# Patient Record
Sex: FEMALE | Race: WHITE | NOT HISPANIC OR LATINO | Employment: OTHER | URBAN - METROPOLITAN AREA
[De-identification: names, ages, dates, MRNs, and addresses within clinical notes are randomized per-mention and may not be internally consistent; named-entity substitution may affect disease eponyms.]

---

## 2017-10-28 LAB
CHOLEST SERPL-MCNC: 148 MG/DL
CHOLEST/HDLC SERPL: 3.1 (CALC)
HDLC SERPL-MCNC: 47 MG/DL
LDLC SERPL CALC-MCNC: 77 MG/DL (CALC)
MAGNESIUM SERPL-MCNC: 1.9 MG/DL (ref 1.5–2.5)
NONHDLC SERPL-MCNC: 101 MG/DL (CALC)
TRIGL SERPL-MCNC: 139 MG/DL
TSH SERPL-ACNC: 1.55 MIU/L (ref 0.4–4.5)

## 2017-11-07 LAB
ALBUMIN SERPL-MCNC: 4.2 G/DL (ref 3.6–5.1)
ALBUMIN/GLOB SERPL: 1.6 (CALC) (ref 1–2.5)
ALP SERPL-CCNC: 99 U/L (ref 33–130)
ALT SERPL-CCNC: 13 U/L (ref 6–29)
AST SERPL-CCNC: 16 U/L (ref 10–35)
BASOPHILS # BLD AUTO: 80 CELLS/UL (ref 0–200)
BASOPHILS NFR BLD AUTO: 1.2 %
BILIRUB SERPL-MCNC: 0.6 MG/DL (ref 0.2–1.2)
BUN SERPL-MCNC: 26 MG/DL (ref 7–25)
BUN/CREAT SERPL: 19 (CALC) (ref 6–22)
CALCIUM SERPL-MCNC: 9.4 MG/DL (ref 8.6–10.4)
CHLORIDE SERPL-SCNC: 105 MMOL/L (ref 98–110)
CO2 SERPL-SCNC: 28 MMOL/L (ref 20–31)
CREAT SERPL-MCNC: 1.36 MG/DL (ref 0.6–0.93)
EOSINOPHIL # BLD AUTO: 261 CELLS/UL (ref 15–500)
EOSINOPHIL NFR BLD AUTO: 3.9 %
ERYTHROCYTE [DISTWIDTH] IN BLOOD BY AUTOMATED COUNT: 14.5 % (ref 11–15)
GLOBULIN SER CALC-MCNC: 2.7 G/DL (CALC) (ref 1.9–3.7)
GLUCOSE SERPL-MCNC: 117 MG/DL (ref 65–99)
HCT VFR BLD AUTO: 43.6 % (ref 35–45)
HGB BLD-MCNC: 14.9 G/DL (ref 11.7–15.5)
LYMPHOCYTES # BLD AUTO: 1333 CELLS/UL (ref 850–3900)
LYMPHOCYTES NFR BLD AUTO: 19.9 %
MCH RBC QN AUTO: 33.9 PG (ref 27–33)
MCHC RBC AUTO-ENTMCNC: 34.2 G/DL (ref 32–36)
MCV RBC AUTO: 99.1 FL (ref 80–100)
MONOCYTES # BLD AUTO: 382 CELLS/UL (ref 200–950)
MONOCYTES NFR BLD AUTO: 5.7 %
NEUTROPHILS # BLD AUTO: 4643 CELLS/UL (ref 1500–7800)
NEUTROPHILS NFR BLD AUTO: 69.3 %
PLATELET # BLD AUTO: 194 THOUSAND/UL (ref 140–400)
PMV BLD REES-ECKER: 10.5 FL (ref 7.5–12.5)
POTASSIUM SERPL-SCNC: 4.5 MMOL/L (ref 3.5–5.3)
PROT SERPL-MCNC: 6.9 G/DL (ref 6.1–8.1)
RBC # BLD AUTO: 4.4 MILLION/UL (ref 3.8–5.1)
SL AMB EGFR AFRICAN AMERICAN: 44 ML/MIN/1.73M2
SL AMB EGFR NON AFRICAN AMERICAN: 38 ML/MIN/1.73M2
SODIUM SERPL-SCNC: 144 MMOL/L (ref 135–146)
WBC # BLD AUTO: 6.7 THOUSAND/UL (ref 3.8–10.8)

## 2017-11-15 ENCOUNTER — CONVERSION ENCOUNTER (OUTPATIENT)
Dept: RADIOLOGY | Facility: IMAGING CENTER | Age: 77
End: 2017-11-15

## 2018-04-27 LAB
ALBUMIN SERPL-MCNC: 3.9 G/DL (ref 3.6–5.1)
ALBUMIN/GLOB SERPL: 1.5 (CALC) (ref 1–2.5)
ALP SERPL-CCNC: 90 U/L (ref 33–130)
ALT SERPL-CCNC: 10 U/L (ref 6–29)
AST SERPL-CCNC: 15 U/L (ref 10–35)
BASOPHILS # BLD AUTO: 52 CELLS/UL (ref 0–200)
BASOPHILS NFR BLD AUTO: 0.8 %
BILIRUB SERPL-MCNC: 0.6 MG/DL (ref 0.2–1.2)
BUN SERPL-MCNC: 19 MG/DL (ref 7–25)
BUN/CREAT SERPL: 16 (CALC) (ref 6–22)
CALCIUM SERPL-MCNC: 9.3 MG/DL (ref 8.6–10.4)
CHLORIDE SERPL-SCNC: 109 MMOL/L (ref 98–110)
CHOLEST SERPL-MCNC: 135 MG/DL
CHOLEST/HDLC SERPL: 2.8 (CALC)
CO2 SERPL-SCNC: 26 MMOL/L (ref 20–31)
CREAT SERPL-MCNC: 1.21 MG/DL (ref 0.6–0.93)
EOSINOPHIL # BLD AUTO: 202 CELLS/UL (ref 15–500)
EOSINOPHIL NFR BLD AUTO: 3.1 %
ERYTHROCYTE [DISTWIDTH] IN BLOOD BY AUTOMATED COUNT: 14.8 % (ref 11–15)
GLOBULIN SER CALC-MCNC: 2.6 G/DL (CALC) (ref 1.9–3.7)
GLUCOSE SERPL-MCNC: 115 MG/DL (ref 65–99)
HCT VFR BLD AUTO: 43.4 % (ref 35–45)
HDLC SERPL-MCNC: 48 MG/DL
HGB BLD-MCNC: 14.7 G/DL (ref 11.7–15.5)
LDLC SERPL CALC-MCNC: 62 MG/DL (CALC)
LYMPHOCYTES # BLD AUTO: 1157 CELLS/UL (ref 850–3900)
LYMPHOCYTES NFR BLD AUTO: 17.8 %
MCH RBC QN AUTO: 34.2 PG (ref 27–33)
MCHC RBC AUTO-ENTMCNC: 33.9 G/DL (ref 32–36)
MCV RBC AUTO: 100.9 FL (ref 80–100)
MONOCYTES # BLD AUTO: 319 CELLS/UL (ref 200–950)
MONOCYTES NFR BLD AUTO: 4.9 %
NEUTROPHILS # BLD AUTO: 4771 CELLS/UL (ref 1500–7800)
NEUTROPHILS NFR BLD AUTO: 73.4 %
NONHDLC SERPL-MCNC: 87 MG/DL (CALC)
PLATELET # BLD AUTO: 176 THOUSAND/UL (ref 140–400)
PMV BLD REES-ECKER: 10.5 FL (ref 7.5–12.5)
POTASSIUM SERPL-SCNC: 4.6 MMOL/L (ref 3.5–5.3)
PROT SERPL-MCNC: 6.5 G/DL (ref 6.1–8.1)
RBC # BLD AUTO: 4.3 MILLION/UL (ref 3.8–5.1)
SL AMB EGFR AFRICAN AMERICAN: 51 ML/MIN/1.73M2
SL AMB EGFR NON AFRICAN AMERICAN: 44 ML/MIN/1.73M2
SODIUM SERPL-SCNC: 146 MMOL/L (ref 135–146)
TRIGL SERPL-MCNC: 167 MG/DL
TSH SERPL-ACNC: 1.64 MIU/L (ref 0.4–4.5)
WBC # BLD AUTO: 6.5 THOUSAND/UL (ref 3.8–10.8)

## 2018-07-17 ENCOUNTER — APPOINTMENT (OUTPATIENT)
Dept: LAB | Facility: IMAGING CENTER | Age: 78
End: 2018-07-17
Payer: COMMERCIAL

## 2018-07-17 ENCOUNTER — TRANSCRIBE ORDERS (OUTPATIENT)
Dept: ADMINISTRATIVE | Facility: HOSPITAL | Age: 78
End: 2018-07-17

## 2018-07-17 DIAGNOSIS — D58.2 OTHER HEMOGLOBINOPATHIES (HCC): Primary | ICD-10-CM

## 2018-07-17 DIAGNOSIS — D58.2 OTHER HEMOGLOBINOPATHIES (HCC): ICD-10-CM

## 2018-07-17 LAB
ALBUMIN SERPL BCP-MCNC: 3.7 G/DL (ref 3.5–5)
ALP SERPL-CCNC: 110 U/L (ref 46–116)
ALT SERPL W P-5'-P-CCNC: 23 U/L (ref 12–78)
ANION GAP SERPL CALCULATED.3IONS-SCNC: 7 MMOL/L (ref 4–13)
AST SERPL W P-5'-P-CCNC: 16 U/L (ref 5–45)
BASOPHILS # BLD AUTO: 0.06 THOUSANDS/ΜL (ref 0–0.1)
BASOPHILS NFR BLD AUTO: 1 % (ref 0–1)
BILIRUB SERPL-MCNC: 0.59 MG/DL (ref 0.2–1)
BUN SERPL-MCNC: 26 MG/DL (ref 5–25)
CALCIUM SERPL-MCNC: 9.4 MG/DL (ref 8.3–10.1)
CHLORIDE SERPL-SCNC: 109 MMOL/L (ref 100–108)
CO2 SERPL-SCNC: 27 MMOL/L (ref 21–32)
CREAT SERPL-MCNC: 1.15 MG/DL (ref 0.6–1.3)
EOSINOPHIL # BLD AUTO: 0.15 THOUSAND/ΜL (ref 0–0.61)
EOSINOPHIL NFR BLD AUTO: 2 % (ref 0–6)
ERYTHROCYTE [DISTWIDTH] IN BLOOD BY AUTOMATED COUNT: 15 % (ref 11.6–15.1)
GFR SERPL CREATININE-BSD FRML MDRD: 47 ML/MIN/1.73SQ M
GLUCOSE SERPL-MCNC: 90 MG/DL (ref 65–140)
HCT VFR BLD AUTO: 45.1 % (ref 34.8–46.1)
HGB BLD-MCNC: 14.5 G/DL (ref 11.5–15.4)
IMM GRANULOCYTES # BLD AUTO: 0.02 THOUSAND/UL (ref 0–0.2)
IMM GRANULOCYTES NFR BLD AUTO: 0 % (ref 0–2)
LYMPHOCYTES # BLD AUTO: 1.25 THOUSANDS/ΜL (ref 0.6–4.47)
LYMPHOCYTES NFR BLD AUTO: 16 % (ref 14–44)
MCH RBC QN AUTO: 33.3 PG (ref 26.8–34.3)
MCHC RBC AUTO-ENTMCNC: 32.2 G/DL (ref 31.4–37.4)
MCV RBC AUTO: 104 FL (ref 82–98)
MONOCYTES # BLD AUTO: 0.47 THOUSAND/ΜL (ref 0.17–1.22)
MONOCYTES NFR BLD AUTO: 6 % (ref 4–12)
NEUTROPHILS # BLD AUTO: 6.13 THOUSANDS/ΜL (ref 1.85–7.62)
NEUTS SEG NFR BLD AUTO: 75 % (ref 43–75)
NRBC BLD AUTO-RTO: 0 /100 WBCS
PLATELET # BLD AUTO: 187 THOUSANDS/UL (ref 149–390)
PMV BLD AUTO: 10.4 FL (ref 8.9–12.7)
POTASSIUM SERPL-SCNC: 4.9 MMOL/L (ref 3.5–5.3)
PROT SERPL-MCNC: 7.1 G/DL (ref 6.4–8.2)
RBC # BLD AUTO: 4.35 MILLION/UL (ref 3.81–5.12)
SODIUM SERPL-SCNC: 143 MMOL/L (ref 136–145)
WBC # BLD AUTO: 8.08 THOUSAND/UL (ref 4.31–10.16)

## 2018-07-17 PROCEDURE — 85025 COMPLETE CBC W/AUTO DIFF WBC: CPT

## 2018-07-17 PROCEDURE — 80053 COMPREHEN METABOLIC PANEL: CPT

## 2018-07-17 PROCEDURE — 36415 COLL VENOUS BLD VENIPUNCTURE: CPT

## 2018-09-01 DIAGNOSIS — D45 PV (POLYCYTHEMIA VERA) (HCC): Primary | ICD-10-CM

## 2018-09-04 RX ORDER — HYDROXYUREA 500 MG/1
CAPSULE ORAL
Qty: 90 CAPSULE | Refills: 4 | Status: SHIPPED | OUTPATIENT
Start: 2018-09-04 | End: 2018-10-17 | Stop reason: SDUPTHER

## 2018-09-17 ENCOUNTER — APPOINTMENT (OUTPATIENT)
Dept: LAB | Facility: IMAGING CENTER | Age: 78
End: 2018-09-17
Payer: COMMERCIAL

## 2018-09-17 ENCOUNTER — TRANSCRIBE ORDERS (OUTPATIENT)
Dept: ADMINISTRATIVE | Facility: HOSPITAL | Age: 78
End: 2018-09-17

## 2018-09-17 DIAGNOSIS — D58.2 OTHER HEMOGLOBINOPATHIES (HCC): Primary | ICD-10-CM

## 2018-09-17 DIAGNOSIS — D58.2 OTHER HEMOGLOBINOPATHIES (HCC): ICD-10-CM

## 2018-09-17 LAB
BASOPHILS # BLD AUTO: 0.05 THOUSANDS/ΜL (ref 0–0.1)
BASOPHILS NFR BLD AUTO: 1 % (ref 0–1)
EOSINOPHIL # BLD AUTO: 0.17 THOUSAND/ΜL (ref 0–0.61)
EOSINOPHIL NFR BLD AUTO: 3 % (ref 0–6)
ERYTHROCYTE [DISTWIDTH] IN BLOOD BY AUTOMATED COUNT: 14.7 % (ref 11.6–15.1)
HCT VFR BLD AUTO: 45.9 % (ref 34.8–46.1)
HGB BLD-MCNC: 14.9 G/DL (ref 11.5–15.4)
IMM GRANULOCYTES # BLD AUTO: 0.02 THOUSAND/UL (ref 0–0.2)
IMM GRANULOCYTES NFR BLD AUTO: 0 % (ref 0–2)
LYMPHOCYTES # BLD AUTO: 1.22 THOUSANDS/ΜL (ref 0.6–4.47)
LYMPHOCYTES NFR BLD AUTO: 19 % (ref 14–44)
MCH RBC QN AUTO: 34.1 PG (ref 26.8–34.3)
MCHC RBC AUTO-ENTMCNC: 32.5 G/DL (ref 31.4–37.4)
MCV RBC AUTO: 105 FL (ref 82–98)
MONOCYTES # BLD AUTO: 0.24 THOUSAND/ΜL (ref 0.17–1.22)
MONOCYTES NFR BLD AUTO: 4 % (ref 4–12)
NEUTROPHILS # BLD AUTO: 4.78 THOUSANDS/ΜL (ref 1.85–7.62)
NEUTS SEG NFR BLD AUTO: 73 % (ref 43–75)
NRBC BLD AUTO-RTO: 0 /100 WBCS
PLATELET # BLD AUTO: 175 THOUSANDS/UL (ref 149–390)
PMV BLD AUTO: 10 FL (ref 8.9–12.7)
RBC # BLD AUTO: 4.37 MILLION/UL (ref 3.81–5.12)
WBC # BLD AUTO: 6.48 THOUSAND/UL (ref 4.31–10.16)

## 2018-09-17 PROCEDURE — 85025 COMPLETE CBC W/AUTO DIFF WBC: CPT

## 2018-09-17 PROCEDURE — 36415 COLL VENOUS BLD VENIPUNCTURE: CPT

## 2018-09-26 PROBLEM — D45 PV (POLYCYTHEMIA VERA) (HCC): Status: ACTIVE | Noted: 2018-09-26

## 2018-09-26 PROBLEM — D51.8 OTHER VITAMIN B12 DEFICIENCY ANEMIAS: Status: ACTIVE | Noted: 2018-09-26

## 2018-10-01 DIAGNOSIS — E78.5 HYPERLIPIDEMIA, UNSPECIFIED HYPERLIPIDEMIA TYPE: Primary | ICD-10-CM

## 2018-10-02 RX ORDER — ATORVASTATIN CALCIUM 20 MG/1
TABLET, FILM COATED ORAL
Qty: 90 TABLET | Refills: 1 | Status: SHIPPED | OUTPATIENT
Start: 2018-10-02 | End: 2018-12-11 | Stop reason: SDUPTHER

## 2018-10-10 ENCOUNTER — APPOINTMENT (OUTPATIENT)
Dept: LAB | Facility: IMAGING CENTER | Age: 78
End: 2018-10-10
Payer: COMMERCIAL

## 2018-10-10 ENCOUNTER — TRANSCRIBE ORDERS (OUTPATIENT)
Dept: ADMINISTRATIVE | Facility: HOSPITAL | Age: 78
End: 2018-10-10

## 2018-10-10 DIAGNOSIS — D58.2 OTHER HEMOGLOBINOPATHIES (HCC): ICD-10-CM

## 2018-10-10 DIAGNOSIS — D58.2 OTHER HEMOGLOBINOPATHIES (HCC): Primary | ICD-10-CM

## 2018-10-10 LAB
ALBUMIN SERPL BCP-MCNC: 3.5 G/DL (ref 3.5–5)
ALP SERPL-CCNC: 98 U/L (ref 46–116)
ALT SERPL W P-5'-P-CCNC: 21 U/L (ref 12–78)
ANION GAP SERPL CALCULATED.3IONS-SCNC: 3 MMOL/L (ref 4–13)
AST SERPL W P-5'-P-CCNC: 16 U/L (ref 5–45)
BASOPHILS # BLD AUTO: 0.06 THOUSANDS/ΜL (ref 0–0.1)
BASOPHILS NFR BLD AUTO: 1 % (ref 0–1)
BILIRUB SERPL-MCNC: 0.66 MG/DL (ref 0.2–1)
BUN SERPL-MCNC: 20 MG/DL (ref 5–25)
CALCIUM SERPL-MCNC: 8.8 MG/DL (ref 8.3–10.1)
CHLORIDE SERPL-SCNC: 107 MMOL/L (ref 100–108)
CO2 SERPL-SCNC: 29 MMOL/L (ref 21–32)
CREAT SERPL-MCNC: 1.3 MG/DL (ref 0.6–1.3)
CRP SERPL QL: <3 MG/L
EOSINOPHIL # BLD AUTO: 0.15 THOUSAND/ΜL (ref 0–0.61)
EOSINOPHIL NFR BLD AUTO: 3 % (ref 0–6)
ERYTHROCYTE [DISTWIDTH] IN BLOOD BY AUTOMATED COUNT: 14.6 % (ref 11.6–15.1)
ERYTHROCYTE [SEDIMENTATION RATE] IN BLOOD: 14 MM/HOUR (ref 0–20)
EST. AVERAGE GLUCOSE BLD GHB EST-MCNC: 123 MG/DL
GFR SERPL CREATININE-BSD FRML MDRD: 40 ML/MIN/1.73SQ M
GLUCOSE SERPL-MCNC: 106 MG/DL (ref 65–140)
HBA1C MFR BLD: 5.9 % (ref 4.2–6.3)
HCT VFR BLD AUTO: 43.1 % (ref 34.8–46.1)
HGB BLD-MCNC: 13.8 G/DL (ref 11.5–15.4)
IMM GRANULOCYTES # BLD AUTO: 0.02 THOUSAND/UL (ref 0–0.2)
IMM GRANULOCYTES NFR BLD AUTO: 0 % (ref 0–2)
LDH SERPL-CCNC: 154 U/L (ref 81–234)
LYMPHOCYTES # BLD AUTO: 1 THOUSANDS/ΜL (ref 0.6–4.47)
LYMPHOCYTES NFR BLD AUTO: 17 % (ref 14–44)
MCH RBC QN AUTO: 33.6 PG (ref 26.8–34.3)
MCHC RBC AUTO-ENTMCNC: 32 G/DL (ref 31.4–37.4)
MCV RBC AUTO: 105 FL (ref 82–98)
MONOCYTES # BLD AUTO: 0.22 THOUSAND/ΜL (ref 0.17–1.22)
MONOCYTES NFR BLD AUTO: 4 % (ref 4–12)
NEUTROPHILS # BLD AUTO: 4.3 THOUSANDS/ΜL (ref 1.85–7.62)
NEUTS SEG NFR BLD AUTO: 75 % (ref 43–75)
NRBC BLD AUTO-RTO: 0 /100 WBCS
PLATELET # BLD AUTO: 174 THOUSANDS/UL (ref 149–390)
PMV BLD AUTO: 10 FL (ref 8.9–12.7)
POTASSIUM SERPL-SCNC: 4.5 MMOL/L (ref 3.5–5.3)
PROT SERPL-MCNC: 7 G/DL (ref 6.4–8.2)
RBC # BLD AUTO: 4.11 MILLION/UL (ref 3.81–5.12)
SODIUM SERPL-SCNC: 139 MMOL/L (ref 136–145)
VIT B12 SERPL-MCNC: 2110 PG/ML (ref 100–900)
WBC # BLD AUTO: 5.75 THOUSAND/UL (ref 4.31–10.16)

## 2018-10-10 PROCEDURE — 83918 ORGANIC ACIDS TOTAL QUANT: CPT

## 2018-10-10 PROCEDURE — 82607 VITAMIN B-12: CPT

## 2018-10-10 PROCEDURE — 86140 C-REACTIVE PROTEIN: CPT

## 2018-10-10 PROCEDURE — 85025 COMPLETE CBC W/AUTO DIFF WBC: CPT

## 2018-10-10 PROCEDURE — 83615 LACTATE (LD) (LDH) ENZYME: CPT

## 2018-10-10 PROCEDURE — 83036 HEMOGLOBIN GLYCOSYLATED A1C: CPT

## 2018-10-10 PROCEDURE — 80053 COMPREHEN METABOLIC PANEL: CPT

## 2018-10-10 PROCEDURE — 85652 RBC SED RATE AUTOMATED: CPT

## 2018-10-13 LAB
METHYLMALONATE SERPL-SCNC: 181 NMOL/L (ref 0–378)
SL AMB DISCLAIMER: NORMAL

## 2018-10-17 ENCOUNTER — OFFICE VISIT (OUTPATIENT)
Dept: HEMATOLOGY ONCOLOGY | Facility: CLINIC | Age: 78
End: 2018-10-17
Payer: COMMERCIAL

## 2018-10-17 VITALS
OXYGEN SATURATION: 97 % | RESPIRATION RATE: 18 BRPM | DIASTOLIC BLOOD PRESSURE: 78 MMHG | BODY MASS INDEX: 36.62 KG/M2 | SYSTOLIC BLOOD PRESSURE: 188 MMHG | TEMPERATURE: 97.3 F | WEIGHT: 199 LBS | HEART RATE: 66 BPM | HEIGHT: 62 IN

## 2018-10-17 DIAGNOSIS — D45 PV (POLYCYTHEMIA VERA) (HCC): Primary | ICD-10-CM

## 2018-10-17 DIAGNOSIS — D51.8 OTHER VITAMIN B12 DEFICIENCY ANEMIAS: ICD-10-CM

## 2018-10-17 PROCEDURE — 99214 OFFICE O/P EST MOD 30 MIN: CPT | Performed by: INTERNAL MEDICINE

## 2018-10-17 RX ORDER — OMEPRAZOLE 20 MG/1
20 CAPSULE, DELAYED RELEASE ORAL DAILY
COMMUNITY
End: 2018-12-11 | Stop reason: SDUPTHER

## 2018-10-17 RX ORDER — METOPROLOL TARTRATE 50 MG/1
25 TABLET, FILM COATED ORAL EVERY 12 HOURS SCHEDULED
COMMUNITY
End: 2018-12-11 | Stop reason: SDUPTHER

## 2018-10-17 RX ORDER — ASPIRIN 81 MG/1
81 TABLET, CHEWABLE ORAL DAILY
COMMUNITY

## 2018-10-17 RX ORDER — HYDROXYUREA 500 MG/1
500 CAPSULE ORAL DAILY
Qty: 90 CAPSULE | Refills: 3 | Status: SHIPPED | OUTPATIENT
Start: 2018-10-17 | End: 2019-08-09 | Stop reason: SDUPTHER

## 2018-10-17 NOTE — PROGRESS NOTES
Hematology/Oncology Outpatient Follow-up  Danelle Adams 70 y o  female 1940 [id number]    Date:  10/17/2018        Assessment and Plan:  1  PV (polycythemia vera) ([facility])  We Will keep her on the same dose of Hydrea of 500 mg once a day 2 days on and 1 day off  She is also to continue the baby aspirin on a daily basis  We will continue to monitor her blood counts on every 6 week basis and see her again in 4-6 months for follow-up  - CBC and differential; Standing  - Comprehensive metabolic panel; Standing  - CBC and differential  - Comprehensive metabolic panel  - CBC and differential; Future  - Comprehensive metabolic panel; Future  - C-reactive protein; Future  - LD,Blood; Future  - Sedimentation rate, automated; Future  - Erythropoietin; Future  - hydroxyurea (HYDREA) 500 mg capsule; Take 1 capsule (500 mg total) by mouth daily As directed  Dispense: 90 capsule; Refill: 3    2  Other vitamin B12 deficiency anemias  She is to continue the vitamin B12 oral supplements  - Vitamin B12; Future        HPI:  The patient came today for a follow-up visit  She is doing well with the current dose of the Hydrea without any significant problems or symptoms  Her hemoglobin level continues to be within normal range with normal white cells and platelets  Her systolic blood pressure was high today  Oncology History    Patient had extensive workup for further evaluation of her elevated H&H  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, which is diagnostic for polycythemia vera  She was started on Hydrea 500 mg once a day February 10, 2016  The dose was increased to 500 mg twice a day which resulted in significant drop of her platelet count  We did then decrease the dose and put on hold April 4, 2016  Patient had 2 phlebotomies while she was off the Hydrea  She is currently on Hydrea 500 mg once a day 2 days on and 1 day off and tolerating this dose well            PV (polycythemia vera) ([address] ) 2/3/2016 Initial Diagnosis     PV (polycythemia vera) (HCC)            Interval history:    ROS: Review of Systems   Constitutional: Positive for fatigue  Negative for activity change, appetite change, chills, diaphoresis, fever and unexpected weight change  HENT: Positive for hearing loss  Negative for congestion, dental problem, ear discharge, ear pain, facial swelling, mouth sores, nosebleeds, postnasal drip, sore throat, tinnitus and trouble swallowing  Eyes: Negative for discharge, redness, itching and visual disturbance  Respiratory: Negative for cough, chest tightness, shortness of breath and wheezing  Cardiovascular: Negative for chest pain, palpitations and leg swelling  Gastrointestinal: Negative for abdominal distention, abdominal pain, anal bleeding, blood in stool, constipation, diarrhea, nausea and vomiting  Genitourinary: Negative for difficulty urinating, dysuria, flank pain, frequency, hematuria and urgency  Musculoskeletal: Negative for arthralgias, back pain, gait problem, joint swelling, myalgias and neck pain  Skin: Negative for color change, pallor, rash and wound  Neurological: Negative for dizziness, syncope, speech difficulty, weakness, light-headedness, numbness and headaches  Hematological: Negative for adenopathy  Does not bruise/bleed easily  Psychiatric/Behavioral: Positive for sleep disturbance  Negative for agitation, behavioral problems and confusion         Past Medical History:   Diagnosis Date    CAD (coronary artery disease)        Past Surgical History:   Procedure Laterality Date    CAROTID ENDARTARECTOMY Left     CHOLECYSTECTOMY      2011 and 2012    HYSTERECTOMY  1981    KNEE CARTILAGE SURGERY Right 2001    OOPHORECTOMY  2010    REPLACEMENT TOTAL KNEE  2001       Social History     Social History    Marital status: /Civil Union     Spouse name: N/A    Number of children: N/A    Years of education: N/A     Social History Main Topics    Smoking status: Former Smoker     Types: Cigarettes     Quit date: 1962    Smokeless tobacco: Never Used      Comment: no passive smoke exposure    Alcohol use None    Drug use: Unknown    Sexual activity: Not Asked     Other Topics Concern    None     Social History Narrative    None       Family History   Problem Relation Age of Onset    Heart disease Mother     Hypertension Mother     Diabetes type II Mother     Cancer Father     Cystic fibrosis Daughter        No Known Allergies      Current Outpatient Prescriptions:     aspirin 81 mg chewable tablet, Chew 81 mg daily, Disp: , Rfl:     atorvastatin (LIPITOR) 20 mg tablet, TAKE 1 TABLET BY MOUTH EVERY DAY, Disp: 90 tablet, Rfl: 1    hydroxyurea (HYDREA) 500 mg capsule, Take 1 capsule (500 mg total) by mouth daily As directed, Disp: 90 capsule, Rfl: 3    metoprolol tartrate (LOPRESSOR) 50 mg tablet, Take 25 mg by mouth every 12 (twelve) hours, Disp: , Rfl:     omeprazole (PriLOSEC) 20 mg delayed release capsule, Take 20 mg by mouth daily, Disp: , Rfl:     sertraline (ZOLOFT) 50 mg tablet, Take 50 mg by mouth daily, Disp: , Rfl:       Physical Exam:  BP (!) 188/78 (BP Location: Left arm, Cuff Size: Standard)   Pulse 66   Temp (!) 97 3 °F (36 3 °C) (Tympanic)   Resp 18   Ht 5' 2" (1 575 m)   Wt 90 3 kg (199 lb)   SpO2 97%   BMI 36 40 kg/m²     Physical Exam   Constitutional: She is oriented to person, place, and time  She appears well-developed and well-nourished  No distress  HENT:   Head: Normocephalic and atraumatic  Nose: Nose normal    Mouth/Throat: Oropharynx is clear and moist    Eyes: Pupils are equal, round, and reactive to light  Conjunctivae and EOM are normal  Right eye exhibits no discharge  Left eye exhibits no discharge  No scleral icterus  Dry eyes   Neck: Normal range of motion  Neck supple  No JVD present  No tracheal deviation present  No thyromegaly present     Cardiovascular: Normal rate, regular rhythm and normal heart sounds  Exam reveals no friction rub  No murmur heard  Pulmonary/Chest: Effort normal and breath sounds normal  No stridor  No respiratory distress  She has no wheezes  She has no rales  She exhibits no tenderness  Abdominal: Soft  Bowel sounds are normal  She exhibits no distension and no mass  There is no hepatosplenomegaly, splenomegaly or hepatomegaly  There is no tenderness  There is no rebound and no guarding  Musculoskeletal: Normal range of motion  She exhibits no edema, tenderness or deformity  Lymphadenopathy:     She has no cervical adenopathy  Neurological: She is alert and oriented to person, place, and time  She has normal reflexes  No cranial nerve deficit  Coordination normal    Skin: Skin is warm and dry  No rash noted  She is not diaphoretic  No erythema  No pallor  Psychiatric: She has a normal mood and affect  Her behavior is normal  Judgment and thought content normal          Labs:  Lab Results   Component Value Date    WBC 5 75 10/10/2018    HGB 13 8 10/10/2018    HCT 43 1 10/10/2018     (H) 10/10/2018     10/10/2018     Lab Results   Component Value Date     10/10/2018    K 4 5 10/10/2018     10/10/2018    CO2 29 10/10/2018    BUN 20 10/10/2018    CREATININE 1 30 10/10/2018    CALCIUM 8 8 10/10/2018    AST 16 10/10/2018    ALT 21 10/10/2018    ALKPHOS 98 10/10/2018    EGFR 40 10/10/2018     Lab Results   Component Value Date    TSH 1 64 04/26/2018       Patient voiced understanding and agreement in the above discussion  Aware to contact our office with questions/symptoms in the interim

## 2018-11-05 ENCOUNTER — TRANSCRIBE ORDERS (OUTPATIENT)
Dept: ADMINISTRATIVE | Facility: HOSPITAL | Age: 78
End: 2018-11-05

## 2018-11-05 ENCOUNTER — APPOINTMENT (OUTPATIENT)
Dept: LAB | Facility: IMAGING CENTER | Age: 78
End: 2018-11-05
Payer: COMMERCIAL

## 2018-11-05 DIAGNOSIS — I10 ESSENTIAL (PRIMARY) HYPERTENSION: ICD-10-CM

## 2018-11-05 DIAGNOSIS — F41.1 GENERALIZED ANXIETY DISORDER: ICD-10-CM

## 2018-11-05 DIAGNOSIS — D45 PV (POLYCYTHEMIA VERA) (HCC): ICD-10-CM

## 2018-11-05 DIAGNOSIS — F41.1 GENERALIZED ANXIETY DISORDER: Primary | ICD-10-CM

## 2018-11-05 LAB
ALBUMIN SERPL BCP-MCNC: 3.6 G/DL (ref 3.5–5)
ALP SERPL-CCNC: 99 U/L (ref 46–116)
ALT SERPL W P-5'-P-CCNC: 20 U/L (ref 12–78)
ANION GAP SERPL CALCULATED.3IONS-SCNC: 4 MMOL/L (ref 4–13)
AST SERPL W P-5'-P-CCNC: 16 U/L (ref 5–45)
BASOPHILS # BLD AUTO: 0.05 THOUSANDS/ΜL (ref 0–0.1)
BASOPHILS NFR BLD AUTO: 1 % (ref 0–1)
BILIRUB SERPL-MCNC: 0.58 MG/DL (ref 0.2–1)
BUN SERPL-MCNC: 23 MG/DL (ref 5–25)
CALCIUM SERPL-MCNC: 9.2 MG/DL (ref 8.3–10.1)
CHLORIDE SERPL-SCNC: 105 MMOL/L (ref 100–108)
CHOLEST SERPL-MCNC: 131 MG/DL (ref 50–200)
CO2 SERPL-SCNC: 30 MMOL/L (ref 21–32)
CREAT SERPL-MCNC: 1.52 MG/DL (ref 0.6–1.3)
EOSINOPHIL # BLD AUTO: 0.15 THOUSAND/ΜL (ref 0–0.61)
EOSINOPHIL NFR BLD AUTO: 3 % (ref 0–6)
ERYTHROCYTE [DISTWIDTH] IN BLOOD BY AUTOMATED COUNT: 14.4 % (ref 11.6–15.1)
GFR SERPL CREATININE-BSD FRML MDRD: 33 ML/MIN/1.73SQ M
GLUCOSE P FAST SERPL-MCNC: 105 MG/DL (ref 65–99)
HCT VFR BLD AUTO: 43 % (ref 34.8–46.1)
HDLC SERPL-MCNC: 49 MG/DL (ref 40–60)
HGB BLD-MCNC: 14 G/DL (ref 11.5–15.4)
IMM GRANULOCYTES # BLD AUTO: 0.02 THOUSAND/UL (ref 0–0.2)
IMM GRANULOCYTES NFR BLD AUTO: 0 % (ref 0–2)
LDLC SERPL CALC-MCNC: 50 MG/DL (ref 0–100)
LYMPHOCYTES # BLD AUTO: 1.05 THOUSANDS/ΜL (ref 0.6–4.47)
LYMPHOCYTES NFR BLD AUTO: 19 % (ref 14–44)
MCH RBC QN AUTO: 34.1 PG (ref 26.8–34.3)
MCHC RBC AUTO-ENTMCNC: 32.6 G/DL (ref 31.4–37.4)
MCV RBC AUTO: 105 FL (ref 82–98)
MONOCYTES # BLD AUTO: 0.28 THOUSAND/ΜL (ref 0.17–1.22)
MONOCYTES NFR BLD AUTO: 5 % (ref 4–12)
NEUTROPHILS # BLD AUTO: 4 THOUSANDS/ΜL (ref 1.85–7.62)
NEUTS SEG NFR BLD AUTO: 72 % (ref 43–75)
NONHDLC SERPL-MCNC: 82 MG/DL
NRBC BLD AUTO-RTO: 0 /100 WBCS
PLATELET # BLD AUTO: 164 THOUSANDS/UL (ref 149–390)
PMV BLD AUTO: 10.3 FL (ref 8.9–12.7)
POTASSIUM SERPL-SCNC: 4.8 MMOL/L (ref 3.5–5.3)
PROT SERPL-MCNC: 7.1 G/DL (ref 6.4–8.2)
RBC # BLD AUTO: 4.11 MILLION/UL (ref 3.81–5.12)
SODIUM SERPL-SCNC: 139 MMOL/L (ref 136–145)
TRIGL SERPL-MCNC: 158 MG/DL
WBC # BLD AUTO: 5.55 THOUSAND/UL (ref 4.31–10.16)

## 2018-11-05 PROCEDURE — 85025 COMPLETE CBC W/AUTO DIFF WBC: CPT

## 2018-11-05 PROCEDURE — 80061 LIPID PANEL: CPT

## 2018-11-05 PROCEDURE — 80053 COMPREHEN METABOLIC PANEL: CPT

## 2018-11-05 PROCEDURE — 36415 COLL VENOUS BLD VENIPUNCTURE: CPT

## 2018-11-14 ENCOUNTER — TELEPHONE (OUTPATIENT)
Dept: FAMILY MEDICINE CLINIC | Facility: CLINIC | Age: 78
End: 2018-11-14

## 2018-11-14 DIAGNOSIS — Z12.39 SCREENING BREAST EXAMINATION: Primary | ICD-10-CM

## 2018-11-20 ENCOUNTER — APPOINTMENT (OUTPATIENT)
Dept: LAB | Facility: IMAGING CENTER | Age: 78
End: 2018-11-20
Payer: COMMERCIAL

## 2018-11-20 ENCOUNTER — TRANSCRIBE ORDERS (OUTPATIENT)
Dept: ADMINISTRATIVE | Facility: HOSPITAL | Age: 78
End: 2018-11-20

## 2018-11-20 DIAGNOSIS — D45 PV (POLYCYTHEMIA VERA) (HCC): ICD-10-CM

## 2018-11-20 LAB
BASOPHILS # BLD AUTO: 0.03 THOUSANDS/ΜL (ref 0–0.1)
BASOPHILS NFR BLD AUTO: 1 % (ref 0–1)
EOSINOPHIL # BLD AUTO: 0.17 THOUSAND/ΜL (ref 0–0.61)
EOSINOPHIL NFR BLD AUTO: 3 % (ref 0–6)
ERYTHROCYTE [DISTWIDTH] IN BLOOD BY AUTOMATED COUNT: 14.6 % (ref 11.6–15.1)
HCT VFR BLD AUTO: 42.9 % (ref 34.8–46.1)
HGB BLD-MCNC: 14.1 G/DL (ref 11.5–15.4)
IMM GRANULOCYTES # BLD AUTO: 0.03 THOUSAND/UL (ref 0–0.2)
IMM GRANULOCYTES NFR BLD AUTO: 1 % (ref 0–2)
LYMPHOCYTES # BLD AUTO: 0.93 THOUSANDS/ΜL (ref 0.6–4.47)
LYMPHOCYTES NFR BLD AUTO: 19 % (ref 14–44)
MCH RBC QN AUTO: 34.6 PG (ref 26.8–34.3)
MCHC RBC AUTO-ENTMCNC: 32.9 G/DL (ref 31.4–37.4)
MCV RBC AUTO: 105 FL (ref 82–98)
MONOCYTES # BLD AUTO: 0.19 THOUSAND/ΜL (ref 0.17–1.22)
MONOCYTES NFR BLD AUTO: 4 % (ref 4–12)
NEUTROPHILS # BLD AUTO: 3.59 THOUSANDS/ΜL (ref 1.85–7.62)
NEUTS SEG NFR BLD AUTO: 72 % (ref 43–75)
NRBC BLD AUTO-RTO: 0 /100 WBCS
PLATELET # BLD AUTO: 150 THOUSANDS/UL (ref 149–390)
PMV BLD AUTO: 10.5 FL (ref 8.9–12.7)
RBC # BLD AUTO: 4.07 MILLION/UL (ref 3.81–5.12)
WBC # BLD AUTO: 4.94 THOUSAND/UL (ref 4.31–10.16)

## 2018-11-20 PROCEDURE — 85025 COMPLETE CBC W/AUTO DIFF WBC: CPT

## 2018-11-20 PROCEDURE — 36415 COLL VENOUS BLD VENIPUNCTURE: CPT

## 2018-11-20 PROCEDURE — 80053 COMPREHEN METABOLIC PANEL: CPT

## 2018-11-21 LAB
ALBUMIN SERPL BCP-MCNC: 3.5 G/DL (ref 3.5–5)
ALP SERPL-CCNC: 100 U/L (ref 46–116)
ALT SERPL W P-5'-P-CCNC: 18 U/L (ref 12–78)
ANION GAP SERPL CALCULATED.3IONS-SCNC: 5 MMOL/L (ref 4–13)
AST SERPL W P-5'-P-CCNC: 15 U/L (ref 5–45)
BILIRUB SERPL-MCNC: 0.5 MG/DL (ref 0.2–1)
BUN SERPL-MCNC: 23 MG/DL (ref 5–25)
CALCIUM SERPL-MCNC: 8.7 MG/DL (ref 8.3–10.1)
CHLORIDE SERPL-SCNC: 109 MMOL/L (ref 100–108)
CO2 SERPL-SCNC: 28 MMOL/L (ref 21–32)
CREAT SERPL-MCNC: 1.25 MG/DL (ref 0.6–1.3)
GFR SERPL CREATININE-BSD FRML MDRD: 42 ML/MIN/1.73SQ M
GLUCOSE SERPL-MCNC: 118 MG/DL (ref 65–140)
POTASSIUM SERPL-SCNC: 4 MMOL/L (ref 3.5–5.3)
PROT SERPL-MCNC: 7.1 G/DL (ref 6.4–8.2)
SODIUM SERPL-SCNC: 142 MMOL/L (ref 136–145)

## 2018-11-27 ENCOUNTER — HOSPITAL ENCOUNTER (OUTPATIENT)
Dept: RADIOLOGY | Facility: IMAGING CENTER | Age: 78
Discharge: HOME | End: 2018-11-27
Payer: COMMERCIAL

## 2018-11-27 VITALS — BODY MASS INDEX: 36.62 KG/M2 | HEIGHT: 62 IN | WEIGHT: 199 LBS

## 2018-11-27 DIAGNOSIS — Z12.39 SCREENING BREAST EXAMINATION: ICD-10-CM

## 2018-11-27 PROCEDURE — 77067 SCR MAMMO BI INCL CAD: CPT

## 2018-11-28 ENCOUNTER — TELEPHONE (OUTPATIENT)
Dept: FAMILY MEDICINE CLINIC | Facility: CLINIC | Age: 78
End: 2018-11-28

## 2018-11-30 ENCOUNTER — TRANSCRIBE ORDERS (OUTPATIENT)
Dept: ADMINISTRATIVE | Facility: HOSPITAL | Age: 78
End: 2018-11-30

## 2018-12-11 ENCOUNTER — OFFICE VISIT (OUTPATIENT)
Dept: FAMILY MEDICINE CLINIC | Facility: CLINIC | Age: 78
End: 2018-12-11
Payer: COMMERCIAL

## 2018-12-11 VITALS
OXYGEN SATURATION: 98 % | BODY MASS INDEX: 36.73 KG/M2 | HEIGHT: 62 IN | HEART RATE: 58 BPM | TEMPERATURE: 96.8 F | SYSTOLIC BLOOD PRESSURE: 124 MMHG | RESPIRATION RATE: 16 BRPM | DIASTOLIC BLOOD PRESSURE: 80 MMHG | WEIGHT: 199.6 LBS

## 2018-12-11 DIAGNOSIS — E78.2 MIXED HYPERLIPIDEMIA: ICD-10-CM

## 2018-12-11 DIAGNOSIS — K21.9 GASTROESOPHAGEAL REFLUX DISEASE WITHOUT ESOPHAGITIS: ICD-10-CM

## 2018-12-11 DIAGNOSIS — E78.5 HYPERLIPIDEMIA, UNSPECIFIED HYPERLIPIDEMIA TYPE: ICD-10-CM

## 2018-12-11 DIAGNOSIS — F32.0 CURRENT MILD EPISODE OF MAJOR DEPRESSIVE DISORDER, UNSPECIFIED WHETHER RECURRENT (HCC): ICD-10-CM

## 2018-12-11 DIAGNOSIS — I10 ESSENTIAL HYPERTENSION: Primary | ICD-10-CM

## 2018-12-11 PROCEDURE — 3074F SYST BP LT 130 MM HG: CPT | Performed by: FAMILY MEDICINE

## 2018-12-11 PROCEDURE — 4040F PNEUMOC VAC/ADMIN/RCVD: CPT | Performed by: FAMILY MEDICINE

## 2018-12-11 PROCEDURE — 1160F RVW MEDS BY RX/DR IN RCRD: CPT | Performed by: FAMILY MEDICINE

## 2018-12-11 PROCEDURE — 1036F TOBACCO NON-USER: CPT | Performed by: FAMILY MEDICINE

## 2018-12-11 PROCEDURE — 3079F DIAST BP 80-89 MM HG: CPT | Performed by: FAMILY MEDICINE

## 2018-12-11 PROCEDURE — 3008F BODY MASS INDEX DOCD: CPT | Performed by: FAMILY MEDICINE

## 2018-12-11 PROCEDURE — 99214 OFFICE O/P EST MOD 30 MIN: CPT | Performed by: FAMILY MEDICINE

## 2018-12-11 RX ORDER — METOPROLOL TARTRATE 50 MG/1
25 TABLET, FILM COATED ORAL EVERY 12 HOURS SCHEDULED
Qty: 90 TABLET | Refills: 1 | Status: SHIPPED | OUTPATIENT
Start: 2018-12-11 | End: 2019-06-11 | Stop reason: SDUPTHER

## 2018-12-11 RX ORDER — ATORVASTATIN CALCIUM 20 MG/1
20 TABLET, FILM COATED ORAL DAILY
Qty: 90 TABLET | Refills: 1 | Status: SHIPPED | OUTPATIENT
Start: 2018-12-11 | End: 2019-06-11 | Stop reason: SDUPTHER

## 2018-12-11 RX ORDER — OMEPRAZOLE 20 MG/1
20 CAPSULE, DELAYED RELEASE ORAL DAILY
Qty: 90 CAPSULE | Refills: 1 | Status: SHIPPED | OUTPATIENT
Start: 2018-12-11 | End: 2019-06-11 | Stop reason: SDUPTHER

## 2018-12-11 RX ORDER — LOSARTAN POTASSIUM 50 MG/1
50 TABLET ORAL EVERY 24 HOURS
Qty: 90 TABLET | Refills: 1 | Status: SHIPPED | OUTPATIENT
Start: 2018-12-11 | End: 2019-06-11 | Stop reason: SDUPTHER

## 2018-12-11 RX ORDER — LOSARTAN POTASSIUM 50 MG/1
TABLET ORAL EVERY 24 HOURS
COMMUNITY
Start: 2018-05-07 | End: 2018-12-11 | Stop reason: SDUPTHER

## 2018-12-11 RX ORDER — HYDROXYZINE HYDROCHLORIDE 25 MG/1
TABLET, FILM COATED ORAL EVERY 24 HOURS
COMMUNITY
Start: 2018-05-07 | End: 2021-11-18 | Stop reason: ALTCHOICE

## 2018-12-11 RX ORDER — MELATONIN
1000 DAILY
COMMUNITY

## 2018-12-11 NOTE — PROGRESS NOTES
Assessment/Plan:    No problem-specific Assessment & Plan notes found for this encounter  Diagnoses and all orders for this visit:    Essential hypertension  Comments:  stable  Orders:  -     metoprolol tartrate (LOPRESSOR) 50 mg tablet; Take 0 5 tablets (25 mg total) by mouth every 12 (twelve) hours  -     losartan (COZAAR) 50 mg tablet; Take 1 tablet (50 mg total) by mouth every 24 hours    Current mild episode of major depressive disorder, unspecified whether recurrent (HCC)  Comments:  Stable  Orders:  -     sertraline (ZOLOFT) 50 mg tablet; Take 1 tablet (50 mg total) by mouth daily    Mixed hyperlipidemia  Comments:  Stable    Hyperlipidemia, unspecified hyperlipidemia type  -     atorvastatin (LIPITOR) 20 mg tablet; Take 1 tablet (20 mg total) by mouth daily    Gastroesophageal reflux disease without esophagitis  Comments:  Stable  Orders:  -     omeprazole (PriLOSEC) 20 mg delayed release capsule; Take 1 capsule (20 mg total) by mouth daily    Other orders  -     hydrOXYzine HCL (ATARAX) 25 mg tablet; every 24 hours  -     Discontinue: losartan (COZAAR) 50 mg tablet; every 24 hours  -     cholecalciferol (VITAMIN D3) 1,000 units tablet; Take 1,000 Units by mouth daily          Subjective:      Patient ID: Castillo Flower is a 70 y o  female  HPI    The following portions of the patient's history were reviewed and updated as appropriate:   She  has a past medical history of CAD (coronary artery disease)    Current Outpatient Prescriptions   Medication Sig Dispense Refill    aspirin 81 mg chewable tablet Chew 81 mg daily      atorvastatin (LIPITOR) 20 mg tablet Take 1 tablet (20 mg total) by mouth daily 90 tablet 1    cholecalciferol (VITAMIN D3) 1,000 units tablet Take 1,000 Units by mouth daily      hydroxyurea (HYDREA) 500 mg capsule Take 1 capsule (500 mg total) by mouth daily As directed 90 capsule 3    hydrOXYzine HCL (ATARAX) 25 mg tablet every 24 hours      losartan (COZAAR) 50 mg tablet Take 1 tablet (50 mg total) by mouth every 24 hours 90 tablet 1    metoprolol tartrate (LOPRESSOR) 50 mg tablet Take 0 5 tablets (25 mg total) by mouth every 12 (twelve) hours 90 tablet 1    omeprazole (PriLOSEC) 20 mg delayed release capsule Take 1 capsule (20 mg total) by mouth daily 90 capsule 1    sertraline (ZOLOFT) 50 mg tablet Take 1 tablet (50 mg total) by mouth daily 90 tablet 1     No current facility-administered medications for this visit  She is allergic to no active allergies  Review of Systems   Constitutional: Negative  HENT: Negative  Eyes: Negative  Respiratory: Negative  Cardiovascular: Negative  Gastrointestinal: Negative  Genitourinary: Negative  Psychiatric/Behavioral: Negative  Objective:      /80 (BP Location: Left arm, Patient Position: Sitting, Cuff Size: Large)   Pulse 58   Temp (!) 96 8 °F (36 °C) (Tympanic)   Resp 16   Ht 5' 2" (1 575 m)   Wt 90 5 kg (199 lb 9 6 oz)   LMP  (LMP Unknown)   SpO2 98%   BMI 36 51 kg/m²          Physical Exam   Constitutional: She is oriented to person, place, and time  She appears well-developed  HENT:   Head: Normocephalic  Mouth/Throat: Oropharynx is clear and moist    Neck: Normal range of motion  Cardiovascular: Normal rate and regular rhythm  Pulmonary/Chest: Effort normal and breath sounds normal    Abdominal: Soft  Bowel sounds are normal    Neurological: She is alert and oriented to person, place, and time  Skin: Skin is warm  Psychiatric: She has a normal mood and affect

## 2019-01-03 ENCOUNTER — TRANSCRIBE ORDERS (OUTPATIENT)
Dept: ADMINISTRATIVE | Facility: HOSPITAL | Age: 79
End: 2019-01-03

## 2019-01-03 ENCOUNTER — APPOINTMENT (OUTPATIENT)
Dept: LAB | Facility: IMAGING CENTER | Age: 79
End: 2019-01-03
Payer: COMMERCIAL

## 2019-01-03 DIAGNOSIS — D45 PV (POLYCYTHEMIA VERA) (HCC): ICD-10-CM

## 2019-01-03 LAB
ALBUMIN SERPL BCP-MCNC: 3.4 G/DL (ref 3.5–5)
ALP SERPL-CCNC: 103 U/L (ref 46–116)
ALT SERPL W P-5'-P-CCNC: 23 U/L (ref 12–78)
ANION GAP SERPL CALCULATED.3IONS-SCNC: 4 MMOL/L (ref 4–13)
AST SERPL W P-5'-P-CCNC: 18 U/L (ref 5–45)
BASOPHILS # BLD AUTO: 0.05 THOUSANDS/ΜL (ref 0–0.1)
BASOPHILS NFR BLD AUTO: 1 % (ref 0–1)
BILIRUB SERPL-MCNC: 0.44 MG/DL (ref 0.2–1)
BUN SERPL-MCNC: 22 MG/DL (ref 5–25)
CALCIUM SERPL-MCNC: 8.9 MG/DL (ref 8.3–10.1)
CHLORIDE SERPL-SCNC: 110 MMOL/L (ref 100–108)
CO2 SERPL-SCNC: 29 MMOL/L (ref 21–32)
CREAT SERPL-MCNC: 1.31 MG/DL (ref 0.6–1.3)
EOSINOPHIL # BLD AUTO: 0.15 THOUSAND/ΜL (ref 0–0.61)
EOSINOPHIL NFR BLD AUTO: 3 % (ref 0–6)
ERYTHROCYTE [DISTWIDTH] IN BLOOD BY AUTOMATED COUNT: 14.9 % (ref 11.6–15.1)
GFR SERPL CREATININE-BSD FRML MDRD: 40 ML/MIN/1.73SQ M
GLUCOSE SERPL-MCNC: 108 MG/DL (ref 65–140)
HCT VFR BLD AUTO: 43.2 % (ref 34.8–46.1)
HGB BLD-MCNC: 13.9 G/DL (ref 11.5–15.4)
IMM GRANULOCYTES # BLD AUTO: 0.02 THOUSAND/UL (ref 0–0.2)
IMM GRANULOCYTES NFR BLD AUTO: 0 % (ref 0–2)
LYMPHOCYTES # BLD AUTO: 0.97 THOUSANDS/ΜL (ref 0.6–4.47)
LYMPHOCYTES NFR BLD AUTO: 17 % (ref 14–44)
MCH RBC QN AUTO: 33.9 PG (ref 26.8–34.3)
MCHC RBC AUTO-ENTMCNC: 32.2 G/DL (ref 31.4–37.4)
MCV RBC AUTO: 105 FL (ref 82–98)
MONOCYTES # BLD AUTO: 0.24 THOUSAND/ΜL (ref 0.17–1.22)
MONOCYTES NFR BLD AUTO: 4 % (ref 4–12)
NEUTROPHILS # BLD AUTO: 4.13 THOUSANDS/ΜL (ref 1.85–7.62)
NEUTS SEG NFR BLD AUTO: 75 % (ref 43–75)
NRBC BLD AUTO-RTO: 0 /100 WBCS
PLATELET # BLD AUTO: 154 THOUSANDS/UL (ref 149–390)
PMV BLD AUTO: 10.6 FL (ref 8.9–12.7)
POTASSIUM SERPL-SCNC: 4.4 MMOL/L (ref 3.5–5.3)
PROT SERPL-MCNC: 7.2 G/DL (ref 6.4–8.2)
RBC # BLD AUTO: 4.1 MILLION/UL (ref 3.81–5.12)
SODIUM SERPL-SCNC: 143 MMOL/L (ref 136–145)
WBC # BLD AUTO: 5.56 THOUSAND/UL (ref 4.31–10.16)

## 2019-01-03 PROCEDURE — 36415 COLL VENOUS BLD VENIPUNCTURE: CPT

## 2019-01-03 PROCEDURE — 85025 COMPLETE CBC W/AUTO DIFF WBC: CPT

## 2019-01-03 PROCEDURE — 80053 COMPREHEN METABOLIC PANEL: CPT

## 2019-01-29 ENCOUNTER — OFFICE VISIT (OUTPATIENT)
Dept: FAMILY MEDICINE CLINIC | Facility: CLINIC | Age: 79
End: 2019-01-29
Payer: COMMERCIAL

## 2019-01-29 VITALS
DIASTOLIC BLOOD PRESSURE: 80 MMHG | TEMPERATURE: 96.7 F | SYSTOLIC BLOOD PRESSURE: 142 MMHG | RESPIRATION RATE: 16 BRPM | BODY MASS INDEX: 36.8 KG/M2 | OXYGEN SATURATION: 98 % | HEIGHT: 62 IN | WEIGHT: 200 LBS | HEART RATE: 60 BPM

## 2019-01-29 DIAGNOSIS — L02.415 ABSCESS OF RIGHT THIGH: Primary | ICD-10-CM

## 2019-01-29 PROCEDURE — 99213 OFFICE O/P EST LOW 20 MIN: CPT | Performed by: NURSE PRACTITIONER

## 2019-01-29 PROCEDURE — 1036F TOBACCO NON-USER: CPT | Performed by: NURSE PRACTITIONER

## 2019-01-29 PROCEDURE — 1160F RVW MEDS BY RX/DR IN RCRD: CPT | Performed by: NURSE PRACTITIONER

## 2019-01-29 RX ORDER — CEPHALEXIN 500 MG/1
500 CAPSULE ORAL EVERY 12 HOURS SCHEDULED
Qty: 14 CAPSULE | Refills: 0 | Status: SHIPPED | OUTPATIENT
Start: 2019-01-29 | End: 2019-02-05

## 2019-01-29 NOTE — PROGRESS NOTES
Assessment/Plan:      Diagnoses and all orders for this visit:    Abscess of right thigh  Comments:  will start on Keflex 500mg and instructed to apply warm soaks and cover  To return if not improved  Orders:  -     cephalexin (KEFLEX) 500 mg capsule; Take 1 capsule (500 mg total) by mouth every 12 (twelve) hours for 7 days          Subjective:     Patient ID: Krystin Covington is a 70 y o  female here for sore on her leg  HPI  Sore is on the inner thigh  Started about one weeks ago  Now it is draining with a foul odor  Denies fever but it is tender  Review of Systems   Constitutional: Negative  Respiratory: Negative  Cardiovascular: Negative  Gastrointestinal: Negative  Skin: Positive for wound (right inner thigh)  Allergic/Immunologic: Negative  Neurological: Negative  Psychiatric/Behavioral: Negative  Objective:     Physical Exam   Constitutional: She is oriented to person, place, and time  She appears well-developed and well-nourished  Eyes: Conjunctivae and EOM are normal    Cardiovascular: Normal rate, regular rhythm and normal heart sounds  Pulmonary/Chest: Effort normal and breath sounds normal    Abdominal: Soft  Bowel sounds are normal    Musculoskeletal: Normal range of motion  Neurological: She is alert and oriented to person, place, and time  Skin: Skin is warm and dry  Abscess right upper inner thigh  Drainage is serosanguinous  Psychiatric: She has a normal mood and affect   Her behavior is normal

## 2019-02-11 ENCOUNTER — APPOINTMENT (OUTPATIENT)
Dept: LAB | Facility: IMAGING CENTER | Age: 79
End: 2019-02-11
Payer: COMMERCIAL

## 2019-02-11 ENCOUNTER — TRANSCRIBE ORDERS (OUTPATIENT)
Dept: ADMINISTRATIVE | Facility: HOSPITAL | Age: 79
End: 2019-02-11

## 2019-02-11 DIAGNOSIS — D45 CHRONIC ERYTHREMIA IN REMISSION (HCC): Primary | ICD-10-CM

## 2019-02-11 DIAGNOSIS — D45 CHRONIC ERYTHREMIA IN REMISSION (HCC): ICD-10-CM

## 2019-02-11 DIAGNOSIS — D51.8 OTHER VITAMIN B12 DEFICIENCY ANEMIAS: ICD-10-CM

## 2019-02-11 DIAGNOSIS — D45 PV (POLYCYTHEMIA VERA) (HCC): ICD-10-CM

## 2019-02-11 LAB
ALBUMIN SERPL BCP-MCNC: 3.7 G/DL (ref 3.5–5)
ALP SERPL-CCNC: 109 U/L (ref 46–116)
ALT SERPL W P-5'-P-CCNC: 23 U/L (ref 12–78)
ANION GAP SERPL CALCULATED.3IONS-SCNC: 5 MMOL/L (ref 4–13)
AST SERPL W P-5'-P-CCNC: 19 U/L (ref 5–45)
BASOPHILS # BLD AUTO: 0.07 THOUSANDS/ΜL (ref 0–0.1)
BASOPHILS NFR BLD AUTO: 1 % (ref 0–1)
BILIRUB SERPL-MCNC: 0.48 MG/DL (ref 0.2–1)
BUN SERPL-MCNC: 24 MG/DL (ref 5–25)
CALCIUM SERPL-MCNC: 8.9 MG/DL (ref 8.3–10.1)
CHLORIDE SERPL-SCNC: 108 MMOL/L (ref 100–108)
CO2 SERPL-SCNC: 29 MMOL/L (ref 21–32)
CREAT SERPL-MCNC: 1.28 MG/DL (ref 0.6–1.3)
EOSINOPHIL # BLD AUTO: 0.22 THOUSAND/ΜL (ref 0–0.61)
EOSINOPHIL NFR BLD AUTO: 3 % (ref 0–6)
ERYTHROCYTE [DISTWIDTH] IN BLOOD BY AUTOMATED COUNT: 14.3 % (ref 11.6–15.1)
GFR SERPL CREATININE-BSD FRML MDRD: 41 ML/MIN/1.73SQ M
GLUCOSE SERPL-MCNC: 111 MG/DL (ref 65–140)
HCT VFR BLD AUTO: 45.7 % (ref 34.8–46.1)
HGB BLD-MCNC: 14.6 G/DL (ref 11.5–15.4)
IMM GRANULOCYTES # BLD AUTO: 0.02 THOUSAND/UL (ref 0–0.2)
IMM GRANULOCYTES NFR BLD AUTO: 0 % (ref 0–2)
LYMPHOCYTES # BLD AUTO: 0.95 THOUSANDS/ΜL (ref 0.6–4.47)
LYMPHOCYTES NFR BLD AUTO: 13 % (ref 14–44)
MCH RBC QN AUTO: 33.6 PG (ref 26.8–34.3)
MCHC RBC AUTO-ENTMCNC: 31.9 G/DL (ref 31.4–37.4)
MCV RBC AUTO: 105 FL (ref 82–98)
MONOCYTES # BLD AUTO: 0.32 THOUSAND/ΜL (ref 0.17–1.22)
MONOCYTES NFR BLD AUTO: 5 % (ref 4–12)
NEUTROPHILS # BLD AUTO: 5.5 THOUSANDS/ΜL (ref 1.85–7.62)
NEUTS SEG NFR BLD AUTO: 78 % (ref 43–75)
NRBC BLD AUTO-RTO: 0 /100 WBCS
PLATELET # BLD AUTO: 184 THOUSANDS/UL (ref 149–390)
PMV BLD AUTO: 10.9 FL (ref 8.9–12.7)
POTASSIUM SERPL-SCNC: 4.5 MMOL/L (ref 3.5–5.3)
PROT SERPL-MCNC: 7.1 G/DL (ref 6.4–8.2)
RBC # BLD AUTO: 4.35 MILLION/UL (ref 3.81–5.12)
SODIUM SERPL-SCNC: 142 MMOL/L (ref 136–145)
VIT B12 SERPL-MCNC: 1584 PG/ML (ref 100–900)
WBC # BLD AUTO: 7.08 THOUSAND/UL (ref 4.31–10.16)

## 2019-02-11 PROCEDURE — 36415 COLL VENOUS BLD VENIPUNCTURE: CPT

## 2019-02-11 PROCEDURE — 85025 COMPLETE CBC W/AUTO DIFF WBC: CPT

## 2019-02-11 PROCEDURE — 82607 VITAMIN B-12: CPT

## 2019-02-11 PROCEDURE — 80053 COMPREHEN METABOLIC PANEL: CPT

## 2019-03-06 LAB
LEFT EYE DIABETIC RETINOPATHY: NORMAL
RIGHT EYE DIABETIC RETINOPATHY: NORMAL

## 2019-03-11 ENCOUNTER — TRANSCRIBE ORDERS (OUTPATIENT)
Dept: ADMINISTRATIVE | Facility: HOSPITAL | Age: 79
End: 2019-03-11

## 2019-03-11 ENCOUNTER — APPOINTMENT (OUTPATIENT)
Dept: LAB | Facility: IMAGING CENTER | Age: 79
End: 2019-03-11
Payer: COMMERCIAL

## 2019-03-11 DIAGNOSIS — E66.9 OBESITY, UNSPECIFIED CLASSIFICATION, UNSPECIFIED OBESITY TYPE, UNSPECIFIED WHETHER SERIOUS COMORBIDITY PRESENT: ICD-10-CM

## 2019-03-11 DIAGNOSIS — N18.30 CHRONIC KIDNEY DISEASE, STAGE III (MODERATE) (HCC): Primary | ICD-10-CM

## 2019-03-11 DIAGNOSIS — D45 PV (POLYCYTHEMIA VERA) (HCC): ICD-10-CM

## 2019-03-11 DIAGNOSIS — I12.9 PARENCHYMAL RENAL HYPERTENSION, STAGE 1 THROUGH STAGE 4 OR UNSPECIFIED CHRONIC KIDNEY DISEASE: ICD-10-CM

## 2019-03-11 DIAGNOSIS — N18.30 CHRONIC KIDNEY DISEASE, STAGE III (MODERATE) (HCC): ICD-10-CM

## 2019-03-11 DIAGNOSIS — I73.9 PERIPHERAL VASCULAR DISEASE, UNSPECIFIED (HCC): ICD-10-CM

## 2019-03-11 DIAGNOSIS — E78.5 HYPERLIPIDEMIA, UNSPECIFIED HYPERLIPIDEMIA TYPE: ICD-10-CM

## 2019-03-11 LAB
ALBUMIN SERPL BCP-MCNC: 3.7 G/DL (ref 3.5–5)
ALP SERPL-CCNC: 104 U/L (ref 46–116)
ALT SERPL W P-5'-P-CCNC: 18 U/L (ref 12–78)
ANION GAP SERPL CALCULATED.3IONS-SCNC: 3 MMOL/L (ref 4–13)
AST SERPL W P-5'-P-CCNC: 16 U/L (ref 5–45)
BASOPHILS # BLD AUTO: 0.07 THOUSANDS/ΜL (ref 0–0.1)
BASOPHILS NFR BLD AUTO: 1 % (ref 0–1)
BILIRUB SERPL-MCNC: 0.52 MG/DL (ref 0.2–1)
BUN SERPL-MCNC: 20 MG/DL (ref 5–25)
CALCIUM SERPL-MCNC: 9.7 MG/DL (ref 8.3–10.1)
CHLORIDE SERPL-SCNC: 110 MMOL/L (ref 100–108)
CO2 SERPL-SCNC: 29 MMOL/L (ref 21–32)
CREAT SERPL-MCNC: 1.23 MG/DL (ref 0.6–1.3)
CREAT UR-MCNC: 84.6 MG/DL
CRP SERPL QL: <3 MG/L
EOSINOPHIL # BLD AUTO: 0.2 THOUSAND/ΜL (ref 0–0.61)
EOSINOPHIL NFR BLD AUTO: 3 % (ref 0–6)
ERYTHROCYTE [DISTWIDTH] IN BLOOD BY AUTOMATED COUNT: 14.3 % (ref 11.6–15.1)
ERYTHROCYTE [SEDIMENTATION RATE] IN BLOOD: 13 MM/HOUR (ref 0–20)
GFR SERPL CREATININE-BSD FRML MDRD: 43 ML/MIN/1.73SQ M
GLUCOSE P FAST SERPL-MCNC: 105 MG/DL (ref 65–99)
HCT VFR BLD AUTO: 46.1 % (ref 34.8–46.1)
HGB BLD-MCNC: 14.8 G/DL (ref 11.5–15.4)
IMM GRANULOCYTES # BLD AUTO: 0.02 THOUSAND/UL (ref 0–0.2)
IMM GRANULOCYTES NFR BLD AUTO: 0 % (ref 0–2)
LDH SERPL-CCNC: 186 U/L (ref 81–234)
LYMPHOCYTES # BLD AUTO: 1.28 THOUSANDS/ΜL (ref 0.6–4.47)
LYMPHOCYTES NFR BLD AUTO: 20 % (ref 14–44)
MAGNESIUM SERPL-MCNC: 2.3 MG/DL (ref 1.6–2.6)
MCH RBC QN AUTO: 33.3 PG (ref 26.8–34.3)
MCHC RBC AUTO-ENTMCNC: 32.1 G/DL (ref 31.4–37.4)
MCV RBC AUTO: 104 FL (ref 82–98)
MONOCYTES # BLD AUTO: 0.33 THOUSAND/ΜL (ref 0.17–1.22)
MONOCYTES NFR BLD AUTO: 5 % (ref 4–12)
NEUTROPHILS # BLD AUTO: 4.65 THOUSANDS/ΜL (ref 1.85–7.62)
NEUTS SEG NFR BLD AUTO: 71 % (ref 43–75)
NRBC BLD AUTO-RTO: 0 /100 WBCS
PHOSPHATE SERPL-MCNC: 3.5 MG/DL (ref 2.3–4.1)
PLATELET # BLD AUTO: 185 THOUSANDS/UL (ref 149–390)
PMV BLD AUTO: 10.5 FL (ref 8.9–12.7)
POTASSIUM SERPL-SCNC: 4.4 MMOL/L (ref 3.5–5.3)
PROT SERPL-MCNC: 7.2 G/DL (ref 6.4–8.2)
PROT UR-MCNC: 13 MG/DL
PROT/CREAT UR: 0.15 MG/G{CREAT} (ref 0–0.1)
PTH-INTACT SERPL-MCNC: 86.3 PG/ML (ref 18.4–80.1)
RBC # BLD AUTO: 4.45 MILLION/UL (ref 3.81–5.12)
SODIUM SERPL-SCNC: 142 MMOL/L (ref 136–145)
VIT B12 SERPL-MCNC: 1633 PG/ML (ref 100–900)
WBC # BLD AUTO: 6.55 THOUSAND/UL (ref 4.31–10.16)

## 2019-03-11 PROCEDURE — 85025 COMPLETE CBC W/AUTO DIFF WBC: CPT

## 2019-03-11 PROCEDURE — 82668 ASSAY OF ERYTHROPOIETIN: CPT

## 2019-03-11 PROCEDURE — 82570 ASSAY OF URINE CREATININE: CPT | Performed by: INTERNAL MEDICINE

## 2019-03-11 PROCEDURE — 80053 COMPREHEN METABOLIC PANEL: CPT

## 2019-03-11 PROCEDURE — 83615 LACTATE (LD) (LDH) ENZYME: CPT

## 2019-03-11 PROCEDURE — 85652 RBC SED RATE AUTOMATED: CPT

## 2019-03-11 PROCEDURE — 83735 ASSAY OF MAGNESIUM: CPT

## 2019-03-11 PROCEDURE — 86140 C-REACTIVE PROTEIN: CPT

## 2019-03-11 PROCEDURE — 83970 ASSAY OF PARATHORMONE: CPT

## 2019-03-11 PROCEDURE — 84156 ASSAY OF PROTEIN URINE: CPT | Performed by: INTERNAL MEDICINE

## 2019-03-11 PROCEDURE — 84100 ASSAY OF PHOSPHORUS: CPT

## 2019-03-11 PROCEDURE — 82607 VITAMIN B-12: CPT

## 2019-03-11 PROCEDURE — 36415 COLL VENOUS BLD VENIPUNCTURE: CPT

## 2019-03-12 LAB — EPO SERPL-ACNC: 6.2 MIU/ML (ref 2.6–18.5)

## 2019-04-04 ENCOUNTER — OFFICE VISIT (OUTPATIENT)
Dept: HEMATOLOGY ONCOLOGY | Facility: CLINIC | Age: 79
End: 2019-04-04
Payer: COMMERCIAL

## 2019-04-04 VITALS
HEIGHT: 62 IN | TEMPERATURE: 98 F | SYSTOLIC BLOOD PRESSURE: 138 MMHG | BODY MASS INDEX: 36.95 KG/M2 | RESPIRATION RATE: 16 BRPM | OXYGEN SATURATION: 96 % | WEIGHT: 200.8 LBS | DIASTOLIC BLOOD PRESSURE: 70 MMHG | HEART RATE: 70 BPM

## 2019-04-04 DIAGNOSIS — D45 PV (POLYCYTHEMIA VERA) (HCC): Primary | ICD-10-CM

## 2019-04-04 PROCEDURE — 99214 OFFICE O/P EST MOD 30 MIN: CPT | Performed by: INTERNAL MEDICINE

## 2019-04-20 ENCOUNTER — OFFICE VISIT (OUTPATIENT)
Dept: URGENT CARE | Age: 79
End: 2019-04-20
Payer: COMMERCIAL

## 2019-04-20 VITALS
OXYGEN SATURATION: 94 % | WEIGHT: 201 LBS | TEMPERATURE: 98.5 F | RESPIRATION RATE: 18 BRPM | BODY MASS INDEX: 36.76 KG/M2 | HEART RATE: 72 BPM

## 2019-04-20 DIAGNOSIS — B02.9 HERPES ZOSTER WITHOUT COMPLICATION: Primary | ICD-10-CM

## 2019-04-20 DIAGNOSIS — I10 HYPERTENSION, UNSPECIFIED TYPE: ICD-10-CM

## 2019-04-20 PROCEDURE — 99203 OFFICE O/P NEW LOW 30 MIN: CPT | Performed by: PHYSICIAN ASSISTANT

## 2019-04-20 RX ORDER — NYSTATIN 100000 [USP'U]/G
POWDER TOPICAL 3 TIMES DAILY PRN
Qty: 15 G | Refills: 0 | Status: SHIPPED | OUTPATIENT
Start: 2019-04-20 | End: 2022-04-08

## 2019-04-20 RX ORDER — VALACYCLOVIR HYDROCHLORIDE 1 G/1
1000 TABLET, FILM COATED ORAL 3 TIMES DAILY
Qty: 21 TABLET | Refills: 0 | Status: SHIPPED | OUTPATIENT
Start: 2019-04-20 | End: 2019-11-27 | Stop reason: SDUPTHER

## 2019-05-13 ENCOUNTER — TRANSCRIBE ORDERS (OUTPATIENT)
Dept: ADMINISTRATIVE | Facility: HOSPITAL | Age: 79
End: 2019-05-13

## 2019-05-13 ENCOUNTER — APPOINTMENT (OUTPATIENT)
Dept: LAB | Facility: IMAGING CENTER | Age: 79
End: 2019-05-13
Payer: COMMERCIAL

## 2019-05-13 DIAGNOSIS — E78.5 HYPERLIPIDEMIA, UNSPECIFIED HYPERLIPIDEMIA TYPE: ICD-10-CM

## 2019-05-13 DIAGNOSIS — I73.9 PERIPHERAL VASCULAR DISEASE, UNSPECIFIED (HCC): ICD-10-CM

## 2019-05-13 DIAGNOSIS — E66.9 OBESITY, UNSPECIFIED CLASSIFICATION, UNSPECIFIED OBESITY TYPE, UNSPECIFIED WHETHER SERIOUS COMORBIDITY PRESENT: ICD-10-CM

## 2019-05-13 DIAGNOSIS — N18.30 CHRONIC KIDNEY DISEASE, STAGE III (MODERATE) (HCC): ICD-10-CM

## 2019-05-13 DIAGNOSIS — N18.30 CHRONIC KIDNEY DISEASE, STAGE III (MODERATE) (HCC): Primary | ICD-10-CM

## 2019-05-13 LAB
ALBUMIN SERPL BCP-MCNC: 3.5 G/DL (ref 3.5–5)
ANION GAP SERPL CALCULATED.3IONS-SCNC: 2 MMOL/L (ref 4–13)
BUN SERPL-MCNC: 23 MG/DL (ref 5–25)
CALCIUM SERPL-MCNC: 8.9 MG/DL (ref 8.3–10.1)
CHLORIDE SERPL-SCNC: 108 MMOL/L (ref 100–108)
CO2 SERPL-SCNC: 30 MMOL/L (ref 21–32)
CREAT SERPL-MCNC: 1.29 MG/DL (ref 0.6–1.3)
CREAT UR-MCNC: 88.2 MG/DL
GFR SERPL CREATININE-BSD FRML MDRD: 40 ML/MIN/1.73SQ M
GLUCOSE SERPL-MCNC: 96 MG/DL (ref 65–140)
HCT VFR BLD AUTO: 46.2 % (ref 34.8–46.1)
HGB BLD-MCNC: 14.9 G/DL (ref 11.5–15.4)
MAGNESIUM SERPL-MCNC: 2.1 MG/DL (ref 1.6–2.6)
PHOSPHATE SERPL-MCNC: 3.3 MG/DL (ref 2.3–4.1)
POTASSIUM SERPL-SCNC: 4.3 MMOL/L (ref 3.5–5.3)
PROT UR-MCNC: 19 MG/DL
PROT/CREAT UR: 0.22 MG/G{CREAT} (ref 0–0.1)
PTH-INTACT SERPL-MCNC: 60.1 PG/ML (ref 18.4–80.1)
SODIUM SERPL-SCNC: 140 MMOL/L (ref 136–145)

## 2019-05-13 PROCEDURE — 85018 HEMOGLOBIN: CPT

## 2019-05-13 PROCEDURE — 85014 HEMATOCRIT: CPT

## 2019-05-13 PROCEDURE — 36415 COLL VENOUS BLD VENIPUNCTURE: CPT

## 2019-05-13 PROCEDURE — 83970 ASSAY OF PARATHORMONE: CPT

## 2019-05-13 PROCEDURE — 82570 ASSAY OF URINE CREATININE: CPT | Performed by: INTERNAL MEDICINE

## 2019-05-13 PROCEDURE — 83735 ASSAY OF MAGNESIUM: CPT

## 2019-05-13 PROCEDURE — 84156 ASSAY OF PROTEIN URINE: CPT | Performed by: INTERNAL MEDICINE

## 2019-05-13 PROCEDURE — 80069 RENAL FUNCTION PANEL: CPT

## 2019-06-05 ENCOUNTER — APPOINTMENT (OUTPATIENT)
Dept: LAB | Facility: IMAGING CENTER | Age: 79
End: 2019-06-05
Payer: COMMERCIAL

## 2019-06-05 DIAGNOSIS — D45 PV (POLYCYTHEMIA VERA) (HCC): ICD-10-CM

## 2019-06-05 LAB
ALBUMIN SERPL BCP-MCNC: 3.5 G/DL (ref 3.5–5)
ALP SERPL-CCNC: 92 U/L (ref 46–116)
ALT SERPL W P-5'-P-CCNC: 23 U/L (ref 12–78)
ANION GAP SERPL CALCULATED.3IONS-SCNC: 3 MMOL/L (ref 4–13)
AST SERPL W P-5'-P-CCNC: 16 U/L (ref 5–45)
BASOPHILS # BLD AUTO: 0.08 THOUSANDS/ΜL (ref 0–0.1)
BASOPHILS NFR BLD AUTO: 1 % (ref 0–1)
BILIRUB SERPL-MCNC: 0.47 MG/DL (ref 0.2–1)
BUN SERPL-MCNC: 22 MG/DL (ref 5–25)
CALCIUM SERPL-MCNC: 8.6 MG/DL (ref 8.3–10.1)
CHLORIDE SERPL-SCNC: 110 MMOL/L (ref 100–108)
CO2 SERPL-SCNC: 27 MMOL/L (ref 21–32)
CREAT SERPL-MCNC: 1.22 MG/DL (ref 0.6–1.3)
EOSINOPHIL # BLD AUTO: 0.18 THOUSAND/ΜL (ref 0–0.61)
EOSINOPHIL NFR BLD AUTO: 3 % (ref 0–6)
ERYTHROCYTE [DISTWIDTH] IN BLOOD BY AUTOMATED COUNT: 16.1 % (ref 11.6–15.1)
GFR SERPL CREATININE-BSD FRML MDRD: 43 ML/MIN/1.73SQ M
GLUCOSE P FAST SERPL-MCNC: 101 MG/DL (ref 65–99)
HCT VFR BLD AUTO: 45.2 % (ref 34.8–46.1)
HGB BLD-MCNC: 14.6 G/DL (ref 11.5–15.4)
IMM GRANULOCYTES # BLD AUTO: 0.03 THOUSAND/UL (ref 0–0.2)
IMM GRANULOCYTES NFR BLD AUTO: 0 % (ref 0–2)
LYMPHOCYTES # BLD AUTO: 1.02 THOUSANDS/ΜL (ref 0.6–4.47)
LYMPHOCYTES NFR BLD AUTO: 14 % (ref 14–44)
MCH RBC QN AUTO: 34.4 PG (ref 26.8–34.3)
MCHC RBC AUTO-ENTMCNC: 32.3 G/DL (ref 31.4–37.4)
MCV RBC AUTO: 107 FL (ref 82–98)
MONOCYTES # BLD AUTO: 0.38 THOUSAND/ΜL (ref 0.17–1.22)
MONOCYTES NFR BLD AUTO: 5 % (ref 4–12)
NEUTROPHILS # BLD AUTO: 5.63 THOUSANDS/ΜL (ref 1.85–7.62)
NEUTS SEG NFR BLD AUTO: 77 % (ref 43–75)
NRBC BLD AUTO-RTO: 0 /100 WBCS
PLATELET # BLD AUTO: 174 THOUSANDS/UL (ref 149–390)
PMV BLD AUTO: 10.6 FL (ref 8.9–12.7)
POTASSIUM SERPL-SCNC: 3.7 MMOL/L (ref 3.5–5.3)
PROT SERPL-MCNC: 6.9 G/DL (ref 6.4–8.2)
RBC # BLD AUTO: 4.24 MILLION/UL (ref 3.81–5.12)
SODIUM SERPL-SCNC: 140 MMOL/L (ref 136–145)
WBC # BLD AUTO: 7.32 THOUSAND/UL (ref 4.31–10.16)

## 2019-06-05 PROCEDURE — 80053 COMPREHEN METABOLIC PANEL: CPT

## 2019-06-05 PROCEDURE — 85025 COMPLETE CBC W/AUTO DIFF WBC: CPT

## 2019-06-05 PROCEDURE — 36415 COLL VENOUS BLD VENIPUNCTURE: CPT

## 2019-06-07 RX ORDER — VALACYCLOVIR HYDROCHLORIDE 1 G/1
1000 TABLET, FILM COATED ORAL 3 TIMES DAILY
Refills: 0 | COMMUNITY
Start: 2019-05-25 | End: 2022-04-08

## 2019-06-11 ENCOUNTER — OFFICE VISIT (OUTPATIENT)
Dept: FAMILY MEDICINE CLINIC | Facility: CLINIC | Age: 79
End: 2019-06-11
Payer: COMMERCIAL

## 2019-06-11 VITALS
BODY MASS INDEX: 37.02 KG/M2 | HEART RATE: 65 BPM | SYSTOLIC BLOOD PRESSURE: 136 MMHG | HEIGHT: 62 IN | WEIGHT: 201.2 LBS | RESPIRATION RATE: 16 BRPM | TEMPERATURE: 97.4 F | DIASTOLIC BLOOD PRESSURE: 70 MMHG | OXYGEN SATURATION: 97 %

## 2019-06-11 DIAGNOSIS — Z00.00 HEALTHCARE MAINTENANCE: ICD-10-CM

## 2019-06-11 DIAGNOSIS — L08.9 SKIN INFECTION: ICD-10-CM

## 2019-06-11 DIAGNOSIS — F32.0 CURRENT MILD EPISODE OF MAJOR DEPRESSIVE DISORDER, UNSPECIFIED WHETHER RECURRENT (HCC): ICD-10-CM

## 2019-06-11 DIAGNOSIS — I10 ESSENTIAL HYPERTENSION: ICD-10-CM

## 2019-06-11 DIAGNOSIS — R73.9 HYPERGLYCEMIA: ICD-10-CM

## 2019-06-11 DIAGNOSIS — K21.9 GASTROESOPHAGEAL REFLUX DISEASE WITHOUT ESOPHAGITIS: ICD-10-CM

## 2019-06-11 DIAGNOSIS — E78.5 HYPERLIPIDEMIA, UNSPECIFIED HYPERLIPIDEMIA TYPE: Primary | ICD-10-CM

## 2019-06-11 DIAGNOSIS — L40.9 PSORIASIS: ICD-10-CM

## 2019-06-11 PROBLEM — I65.29 CAROTID STENOSIS: Status: ACTIVE | Noted: 2019-06-11

## 2019-06-11 PROCEDURE — 3075F SYST BP GE 130 - 139MM HG: CPT | Performed by: FAMILY MEDICINE

## 2019-06-11 PROCEDURE — 99214 OFFICE O/P EST MOD 30 MIN: CPT | Performed by: FAMILY MEDICINE

## 2019-06-11 PROCEDURE — 1170F FXNL STATUS ASSESSED: CPT | Performed by: FAMILY MEDICINE

## 2019-06-11 PROCEDURE — 1036F TOBACCO NON-USER: CPT | Performed by: FAMILY MEDICINE

## 2019-06-11 PROCEDURE — 1125F AMNT PAIN NOTED PAIN PRSNT: CPT | Performed by: FAMILY MEDICINE

## 2019-06-11 PROCEDURE — G0439 PPPS, SUBSEQ VISIT: HCPCS | Performed by: FAMILY MEDICINE

## 2019-06-11 PROCEDURE — 3078F DIAST BP <80 MM HG: CPT | Performed by: FAMILY MEDICINE

## 2019-06-11 RX ORDER — MOMETASONE FUROATE 1 MG/ML
SOLUTION TOPICAL DAILY
Qty: 60 ML | Refills: 0 | Status: SHIPPED | OUTPATIENT
Start: 2019-06-11 | End: 2020-06-25 | Stop reason: SDUPTHER

## 2019-06-11 RX ORDER — METOPROLOL TARTRATE 50 MG/1
25 TABLET, FILM COATED ORAL EVERY 12 HOURS SCHEDULED
Qty: 90 TABLET | Refills: 1 | Status: SHIPPED | OUTPATIENT
Start: 2019-06-11 | End: 2019-12-09 | Stop reason: SDUPTHER

## 2019-06-11 RX ORDER — ATORVASTATIN CALCIUM 20 MG/1
20 TABLET, FILM COATED ORAL DAILY
Qty: 90 TABLET | Refills: 1 | Status: SHIPPED | OUTPATIENT
Start: 2019-06-11 | End: 2019-12-09 | Stop reason: SDUPTHER

## 2019-06-11 RX ORDER — LOSARTAN POTASSIUM 50 MG/1
50 TABLET ORAL EVERY 24 HOURS
Qty: 90 TABLET | Refills: 1 | Status: SHIPPED | OUTPATIENT
Start: 2019-06-11 | End: 2019-12-09 | Stop reason: SDUPTHER

## 2019-06-11 RX ORDER — ERYTHROMYCIN 20 MG/ML
SOLUTION TOPICAL DAILY
Qty: 60 ML | Refills: 0 | Status: SHIPPED | OUTPATIENT
Start: 2019-06-11 | End: 2020-06-25 | Stop reason: SDUPTHER

## 2019-06-11 RX ORDER — OMEPRAZOLE 20 MG/1
20 CAPSULE, DELAYED RELEASE ORAL DAILY
Qty: 90 CAPSULE | Refills: 1 | Status: SHIPPED | OUTPATIENT
Start: 2019-06-11 | End: 2019-12-09 | Stop reason: SDUPTHER

## 2019-07-30 ENCOUNTER — TRANSCRIBE ORDERS (OUTPATIENT)
Dept: ADMINISTRATIVE | Facility: HOSPITAL | Age: 79
End: 2019-07-30

## 2019-07-30 ENCOUNTER — APPOINTMENT (OUTPATIENT)
Dept: LAB | Facility: IMAGING CENTER | Age: 79
End: 2019-07-30
Payer: COMMERCIAL

## 2019-07-30 DIAGNOSIS — D45 PV (POLYCYTHEMIA VERA) (HCC): ICD-10-CM

## 2019-07-30 DIAGNOSIS — R73.9 HYPERGLYCEMIA: ICD-10-CM

## 2019-07-30 DIAGNOSIS — E78.5 HYPERLIPIDEMIA, UNSPECIFIED HYPERLIPIDEMIA TYPE: ICD-10-CM

## 2019-07-30 DIAGNOSIS — I10 ESSENTIAL HYPERTENSION: ICD-10-CM

## 2019-07-30 LAB
ALBUMIN SERPL BCP-MCNC: 3.6 G/DL (ref 3.5–5)
ALP SERPL-CCNC: 105 U/L (ref 46–116)
ALT SERPL W P-5'-P-CCNC: 22 U/L (ref 12–78)
ANION GAP SERPL CALCULATED.3IONS-SCNC: 3 MMOL/L (ref 4–13)
AST SERPL W P-5'-P-CCNC: 17 U/L (ref 5–45)
BASOPHILS # BLD AUTO: 0.06 THOUSANDS/ΜL (ref 0–0.1)
BASOPHILS NFR BLD AUTO: 1 % (ref 0–1)
BILIRUB SERPL-MCNC: 0.49 MG/DL (ref 0.2–1)
BUN SERPL-MCNC: 21 MG/DL (ref 5–25)
CALCIUM SERPL-MCNC: 8.8 MG/DL (ref 8.3–10.1)
CHLORIDE SERPL-SCNC: 111 MMOL/L (ref 100–108)
CHOLEST SERPL-MCNC: 123 MG/DL (ref 50–200)
CO2 SERPL-SCNC: 28 MMOL/L (ref 21–32)
CREAT SERPL-MCNC: 1.28 MG/DL (ref 0.6–1.3)
EOSINOPHIL # BLD AUTO: 0.17 THOUSAND/ΜL (ref 0–0.61)
EOSINOPHIL NFR BLD AUTO: 3 % (ref 0–6)
ERYTHROCYTE [DISTWIDTH] IN BLOOD BY AUTOMATED COUNT: 15 % (ref 11.6–15.1)
EST. AVERAGE GLUCOSE BLD GHB EST-MCNC: 111 MG/DL
GFR SERPL CREATININE-BSD FRML MDRD: 41 ML/MIN/1.73SQ M
GLUCOSE P FAST SERPL-MCNC: 107 MG/DL (ref 65–99)
HBA1C MFR BLD: 5.5 % (ref 4.2–6.3)
HCT VFR BLD AUTO: 46.1 % (ref 34.8–46.1)
HDLC SERPL-MCNC: 49 MG/DL (ref 40–60)
HGB BLD-MCNC: 14.6 G/DL (ref 11.5–15.4)
IMM GRANULOCYTES # BLD AUTO: 0.02 THOUSAND/UL (ref 0–0.2)
IMM GRANULOCYTES NFR BLD AUTO: 0 % (ref 0–2)
LDLC SERPL CALC-MCNC: 46 MG/DL (ref 0–100)
LYMPHOCYTES # BLD AUTO: 1.21 THOUSANDS/ΜL (ref 0.6–4.47)
LYMPHOCYTES NFR BLD AUTO: 20 % (ref 14–44)
MCH RBC QN AUTO: 33.7 PG (ref 26.8–34.3)
MCHC RBC AUTO-ENTMCNC: 31.7 G/DL (ref 31.4–37.4)
MCV RBC AUTO: 107 FL (ref 82–98)
MONOCYTES # BLD AUTO: 0.25 THOUSAND/ΜL (ref 0.17–1.22)
MONOCYTES NFR BLD AUTO: 4 % (ref 4–12)
NEUTROPHILS # BLD AUTO: 4.48 THOUSANDS/ΜL (ref 1.85–7.62)
NEUTS SEG NFR BLD AUTO: 72 % (ref 43–75)
NRBC BLD AUTO-RTO: 0 /100 WBCS
PLATELET # BLD AUTO: 166 THOUSANDS/UL (ref 149–390)
PMV BLD AUTO: 10.7 FL (ref 8.9–12.7)
POTASSIUM SERPL-SCNC: 4.2 MMOL/L (ref 3.5–5.3)
PROT SERPL-MCNC: 7.1 G/DL (ref 6.4–8.2)
RBC # BLD AUTO: 4.33 MILLION/UL (ref 3.81–5.12)
SODIUM SERPL-SCNC: 142 MMOL/L (ref 136–145)
TRIGL SERPL-MCNC: 140 MG/DL
TSH SERPL DL<=0.05 MIU/L-ACNC: 1.23 UIU/ML (ref 0.36–3.74)
WBC # BLD AUTO: 6.19 THOUSAND/UL (ref 4.31–10.16)

## 2019-07-30 PROCEDURE — 80061 LIPID PANEL: CPT

## 2019-07-30 PROCEDURE — 85025 COMPLETE CBC W/AUTO DIFF WBC: CPT

## 2019-07-30 PROCEDURE — 83036 HEMOGLOBIN GLYCOSYLATED A1C: CPT

## 2019-07-30 PROCEDURE — 84443 ASSAY THYROID STIM HORMONE: CPT

## 2019-07-30 PROCEDURE — 80053 COMPREHEN METABOLIC PANEL: CPT

## 2019-07-30 PROCEDURE — 36415 COLL VENOUS BLD VENIPUNCTURE: CPT

## 2019-08-05 ENCOUNTER — TELEPHONE (OUTPATIENT)
Dept: FAMILY MEDICINE CLINIC | Facility: CLINIC | Age: 79
End: 2019-08-05

## 2019-08-05 NOTE — TELEPHONE ENCOUNTER
----- Message from Zuly Kirk MD sent at 8/4/2019 11:26 AM EDT -----  BW showed low GFR but stable   All the rest of BW came back normal

## 2019-08-09 ENCOUNTER — OFFICE VISIT (OUTPATIENT)
Dept: HEMATOLOGY ONCOLOGY | Facility: CLINIC | Age: 79
End: 2019-08-09
Payer: COMMERCIAL

## 2019-08-09 VITALS
RESPIRATION RATE: 16 BRPM | HEIGHT: 62 IN | TEMPERATURE: 97.5 F | BODY MASS INDEX: 36.58 KG/M2 | WEIGHT: 198.8 LBS | DIASTOLIC BLOOD PRESSURE: 70 MMHG | SYSTOLIC BLOOD PRESSURE: 110 MMHG | HEART RATE: 60 BPM | OXYGEN SATURATION: 98 %

## 2019-08-09 DIAGNOSIS — D45 PV (POLYCYTHEMIA VERA) (HCC): Primary | ICD-10-CM

## 2019-08-09 PROCEDURE — 99214 OFFICE O/P EST MOD 30 MIN: CPT | Performed by: NURSE PRACTITIONER

## 2019-08-09 RX ORDER — HYDROXYUREA 500 MG/1
CAPSULE ORAL
Qty: 60 CAPSULE | Refills: 3 | Status: SHIPPED | OUTPATIENT
Start: 2019-08-09 | End: 2020-03-25

## 2019-08-09 NOTE — PROGRESS NOTES
Hematology/Oncology Outpatient Follow-up  Katarzyna Baker 70 y o  female 1940 [id number]    Date:  8/9/2019      Assessment and Plan:  1  PV (polycythemia vera) ([facility] [address] )  Patient is tolerating her Hydrea at the current dose 500 mg 2 days on and 1 day off well which is controlling her hemoglobin; she will be continued  We will continue to monitor her laboratory studies on an every 8 week basis  The patient will follow-up again in about 6 months  She was instructed to apply moisturizer liberally to her skin few times per day which may improve her dry itchy skin  She has a follow-up appointment scheduled with her ENT to review all her recent test results  - CBC and differential; Standing  - Comprehensive metabolic panel; Standing  - LD,Blood; Future  - Uric acid; Future  - CBC and differential  - Comprehensive metabolic panel  - hydroxyurea (HYDREA) 500 mg capsule; Take 2 days on and 1 day off  Dispense: 60 capsule; Refill: 3    HPI:  The patient presents today for a follow-up visit accompanied by her   She is taking her Hydrea 500 mg 2 days on and 1 day off which she is tolerating well  She does report dry itchy skin mainly to her arms in her neck, she is taking Benadryl when the itching is bad which improves her symptom  She does report hair thinning on the Hydrea  The patient states that she has been experiencing increased dizziness recently but denies any headache, vision changes or blurred vision  She is being worked up by  ENT for possible vertigo  They did a hearing test which showed 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  They also ordered an MRI of the brain that was done 2 days ago at [facility] that showed no vestibular schwannoma, acute territorial infarct or hemorrhage  There was evidence of mild white matter disease most likely secondary to small vessels through ischemia and 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       Her most recent laboratory studies from the 30th of July showed normal WBC 6 1, H&H 14 6/46 1,  and platelet count 525  LFTs were normal creatinine 1 2 GFR 41  Her hemoglobin has been stable over the past few months in the 14 range  Oncology History    Patient had extensive workup for further evaluation of her elevated H&H  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, which is diagnostic for polycythemia vera  She was started on Hydrea 500 mg once a day February 10, 2016  The dose was increased to 500 mg twice a day which resulted in significant drop of her platelet count  We did then decrease the dose and put on hold April 4, 2016  Patient had 2 phlebotomies while she was off the Hydrea  She is currently on Hydrea 500 mg once a day 2 days on and 1 day off and tolerating this dose well  PV (polycythemia vera) ([facility] [address] )    2/3/2016 Initial Diagnosis     PV (polycythemia vera) ([facility])         Interval history:    ROS: Review of Systems   Constitutional: Positive for fatigue (Mild)  Negative for activity change, appetite change, chills, fever and unexpected weight change  HENT: Positive for hearing loss  Negative for congestion, mouth sores, nosebleeds, sore throat and trouble swallowing  Eyes: Negative  Respiratory: Positive for cough ( mild)  Negative for chest tightness and shortness of breath  Cardiovascular: Positive for leg swelling  Negative for chest pain and palpitations  Gastrointestinal: Negative for abdominal distention, abdominal pain, blood in stool, constipation, diarrhea, nausea and vomiting  Genitourinary: Negative for difficulty urinating, dysuria, frequency, hematuria and urgency  Musculoskeletal: Negative for arthralgias, back pain, gait problem, joint swelling and myalgias  Skin: Negative for color change, pallor and rash  Psoriasis of the scalp   Neurological: Positive for dizziness and numbness ( and tingling mild chronic)   Negative for weakness, light-headedness and headaches  Hematological: Negative for adenopathy  Does not bruise/bleed easily  Psychiatric/Behavioral: Negative for dysphoric mood and sleep disturbance  The patient is not nervous/anxious          Past Medical History:   Diagnosis Date    CAD (coronary artery disease)        Past Surgical History:   Procedure Laterality Date    BREAST BIOPSY      CAROTID ENDARTARECTOMY Left     CHOLECYSTECTOMY       and 2012    HYSTERECTOMY  1981    KNEE CARTILAGE SURGERY Right 2001    OOPHORECTOMY  2010    REPLACEMENT TOTAL KNEE  2001       Social History     Socioeconomic History    Marital status: /Civil Union     Spouse name: None    Number of children: None    Years of education: None    Highest education level: None   Occupational History    None   Social Needs    Financial resource strain: None    Food insecurity:     Worry: None     Inability: None    Transportation needs:     Medical: None     Non-medical: None   Tobacco Use    Smoking status: Former Smoker     Types: Cigarettes     Last attempt to quit: Amanda Feeling     Years since quittin 11    Smokeless tobacco: Never Used    Tobacco comment: no passive smoke exposure   Substance and Sexual Activity    Alcohol use: No    Drug use: No    Sexual activity: None   Lifestyle    Physical activity:     Days per week: None     Minutes per session: None    Stress: None   Relationships    Social connections:     Talks on phone: None     Gets together: None     Attends Baptism service: None     Active member of club or organization: None     Attends meetings of clubs or organizations: None     Relationship status: None    Intimate partner violence:     Fear of current or ex partner: None     Emotionally abused: None     Physically abused: None     Forced sexual activity: None   Other Topics Concern    None   Social History Narrative    None       Family History   Problem Relation Age of Onset    Heart disease Mother  Hypertension Mother     Diabetes type II Mother     Cancer Father     Cystic fibrosis Daughter     Breast cancer Maternal Aunt 48    Breast cancer Cousin 30       Allergies   Allergen Reactions    No Active Allergies          Current Outpatient Medications:     aspirin 81 mg chewable tablet, Chew 81 mg daily, Disp: , Rfl:     atorvastatin (LIPITOR) 20 mg tablet, Take 1 tablet (20 mg total) by mouth daily, Disp: 90 tablet, Rfl: 1    cholecalciferol (VITAMIN D3) 1,000 units tablet, Take 1,000 Units by mouth daily, Disp: , Rfl:     erythromycin with ethanol (THERAMYCIN) 2 % external solution, Apply topically daily, Disp: 60 mL, Rfl: 0    hydroxyurea (HYDREA) 500 mg capsule, Take 2 days on and 1 day off, Disp: 60 capsule, Rfl: 3    hydrOXYzine HCL (ATARAX) 25 mg tablet, every 24 hours, Disp: , Rfl:     losartan (COZAAR) 50 mg tablet, Take 1 tablet (50 mg total) by mouth every 24 hours (Patient taking differently: Take 50 mg by mouth 2 (two) times a day ), Disp: 90 tablet, Rfl: 1    metoprolol tartrate (LOPRESSOR) 50 mg tablet, Take 0 5 tablets (25 mg total) by mouth every 12 (twelve) hours, Disp: 90 tablet, Rfl: 1    mometasone (ELOCON) 0 1 % lotion, Apply topically daily, Disp: 60 mL, Rfl: 0    nystatin (MYCOSTATIN) powder, Apply topically 3 (three) times a day as needed (rash), Disp: 15 g, Rfl: 0    omeprazole (PriLOSEC) 20 mg delayed release capsule, Take 1 capsule (20 mg total) by mouth daily, Disp: 90 capsule, Rfl: 1    sertraline (ZOLOFT) 50 mg tablet, Take 1 tablet (50 mg total) by mouth daily, Disp: 90 tablet, Rfl: 1    valACYclovir (VALTREX) 1,000 mg tablet, Take 1,000 mg by mouth 3 (three) times a day, Disp: , Rfl: 0    valACYclovir (VALTREX) 1,000 mg tablet, Take 1 tablet (1,000 mg total) by mouth 3 (three) times a day for 7 days, Disp: 21 tablet, Rfl: 0      Physical Exam:  /70 (BP Location: Left arm, Cuff Size: Adult)   Pulse 60   Temp 97 5 °F (36 4 °C) (Tympanic)   Resp 16 Ht 5' 2" (1 575 m)   Wt 90 2 kg (198 lb 12 8 oz)   LMP  (LMP Unknown)   SpO2 98%   BMI 36 36 kg/m²     Physical Exam   Constitutional: She is oriented to person, place, and time  She appears well-developed and well-nourished  No distress  HENT:   Head: Normocephalic and atraumatic  Mouth/Throat: Oropharynx is clear and moist  No oropharyngeal exudate  Eyes: Pupils are equal, round, and reactive to light  Conjunctivae are normal  No scleral icterus  Neck: Normal range of motion  Neck supple  No thyromegaly present  Cardiovascular: Normal rate, regular rhythm, normal heart sounds and intact distal pulses  No murmur heard  Pulmonary/Chest: Effort normal and breath sounds normal  No respiratory distress  Abdominal: Soft  Bowel sounds are normal  She exhibits no distension  There is no hepatosplenomegaly  There is no tenderness  Obese   Musculoskeletal: Normal range of motion  She exhibits edema (Trace lower extremity)  Lymphadenopathy:     She has no cervical adenopathy  She has no axillary adenopathy  Neurological: She is alert and oriented to person, place, and time  Skin: Skin is warm and dry  No rash noted  She is not diaphoretic  No erythema  No pallor  Dry skin   Psychiatric: She has a normal mood and affect  Her behavior is normal  Judgment and thought content normal    Vitals reviewed  Labs:  Lab Results   Component Value Date    WBC 6 19 07/30/2019    HGB 14 6 07/30/2019    HCT 46 1 07/30/2019     (H) 07/30/2019     07/30/2019     Lab Results   Component Value Date    K 4 2 07/30/2019     (H) 07/30/2019    CO2 28 07/30/2019    BUN 21 07/30/2019    CREATININE 1 28 07/30/2019    GLUF 107 (H) 07/30/2019    CALCIUM 8 8 07/30/2019    AST 17 07/30/2019    ALT 22 07/30/2019    ALKPHOS 105 07/30/2019    EGFR 41 07/30/2019         Patient voiced understanding and agreement in the above discussion   Aware to contact our office with questions/symptoms in the interim

## 2019-09-24 ENCOUNTER — APPOINTMENT (OUTPATIENT)
Dept: LAB | Facility: IMAGING CENTER | Age: 79
End: 2019-09-24
Payer: COMMERCIAL

## 2019-09-24 LAB
ALBUMIN SERPL BCP-MCNC: 3.7 G/DL (ref 3.5–5)
ALP SERPL-CCNC: 101 U/L (ref 46–116)
ALT SERPL W P-5'-P-CCNC: 22 U/L (ref 12–78)
ANION GAP SERPL CALCULATED.3IONS-SCNC: 2 MMOL/L (ref 4–13)
AST SERPL W P-5'-P-CCNC: 18 U/L (ref 5–45)
BASOPHILS # BLD AUTO: 0.08 THOUSANDS/ΜL (ref 0–0.1)
BASOPHILS NFR BLD AUTO: 1 % (ref 0–1)
BILIRUB SERPL-MCNC: 0.44 MG/DL (ref 0.2–1)
BUN SERPL-MCNC: 28 MG/DL (ref 5–25)
CALCIUM SERPL-MCNC: 9.2 MG/DL (ref 8.3–10.1)
CHLORIDE SERPL-SCNC: 109 MMOL/L (ref 100–108)
CO2 SERPL-SCNC: 29 MMOL/L (ref 21–32)
CREAT SERPL-MCNC: 1.29 MG/DL (ref 0.6–1.3)
EOSINOPHIL # BLD AUTO: 0.16 THOUSAND/ΜL (ref 0–0.61)
EOSINOPHIL NFR BLD AUTO: 2 % (ref 0–6)
ERYTHROCYTE [DISTWIDTH] IN BLOOD BY AUTOMATED COUNT: 14.9 % (ref 11.6–15.1)
GFR SERPL CREATININE-BSD FRML MDRD: 40 ML/MIN/1.73SQ M
GLUCOSE SERPL-MCNC: 108 MG/DL (ref 65–140)
HCT VFR BLD AUTO: 47.3 % (ref 34.8–46.1)
HGB BLD-MCNC: 15 G/DL (ref 11.5–15.4)
IMM GRANULOCYTES # BLD AUTO: 0.04 THOUSAND/UL (ref 0–0.2)
IMM GRANULOCYTES NFR BLD AUTO: 1 % (ref 0–2)
LYMPHOCYTES # BLD AUTO: 1.05 THOUSANDS/ΜL (ref 0.6–4.47)
LYMPHOCYTES NFR BLD AUTO: 13 % (ref 14–44)
MCH RBC QN AUTO: 34 PG (ref 26.8–34.3)
MCHC RBC AUTO-ENTMCNC: 31.7 G/DL (ref 31.4–37.4)
MCV RBC AUTO: 107 FL (ref 82–98)
MONOCYTES # BLD AUTO: 0.29 THOUSAND/ΜL (ref 0.17–1.22)
MONOCYTES NFR BLD AUTO: 4 % (ref 4–12)
NEUTROPHILS # BLD AUTO: 6.22 THOUSANDS/ΜL (ref 1.85–7.62)
NEUTS SEG NFR BLD AUTO: 79 % (ref 43–75)
NRBC BLD AUTO-RTO: 0 /100 WBCS
PLATELET # BLD AUTO: 189 THOUSANDS/UL (ref 149–390)
PMV BLD AUTO: 10.6 FL (ref 8.9–12.7)
POTASSIUM SERPL-SCNC: 4.4 MMOL/L (ref 3.5–5.3)
PROT SERPL-MCNC: 7.5 G/DL (ref 6.4–8.2)
RBC # BLD AUTO: 4.41 MILLION/UL (ref 3.81–5.12)
SODIUM SERPL-SCNC: 140 MMOL/L (ref 136–145)
WBC # BLD AUTO: 7.84 THOUSAND/UL (ref 4.31–10.16)

## 2019-09-24 PROCEDURE — 80053 COMPREHEN METABOLIC PANEL: CPT | Performed by: NURSE PRACTITIONER

## 2019-09-24 PROCEDURE — 36415 COLL VENOUS BLD VENIPUNCTURE: CPT | Performed by: NURSE PRACTITIONER

## 2019-09-24 PROCEDURE — 85025 COMPLETE CBC W/AUTO DIFF WBC: CPT | Performed by: NURSE PRACTITIONER

## 2019-11-19 ENCOUNTER — APPOINTMENT (OUTPATIENT)
Dept: LAB | Facility: IMAGING CENTER | Age: 79
End: 2019-11-19
Payer: COMMERCIAL

## 2019-11-19 DIAGNOSIS — D45 PV (POLYCYTHEMIA VERA) (HCC): ICD-10-CM

## 2019-11-27 ENCOUNTER — TELEPHONE (OUTPATIENT)
Dept: FAMILY MEDICINE CLINIC | Facility: CLINIC | Age: 79
End: 2019-11-27

## 2019-11-27 DIAGNOSIS — B02.9 HERPES ZOSTER WITHOUT COMPLICATION: ICD-10-CM

## 2019-11-27 RX ORDER — VALACYCLOVIR HYDROCHLORIDE 1 G/1
1000 TABLET, FILM COATED ORAL 3 TIMES DAILY
Qty: 21 TABLET | Refills: 0 | Status: SHIPPED | OUTPATIENT
Start: 2019-11-27 | End: 2020-08-20 | Stop reason: ALTCHOICE

## 2019-11-27 NOTE — TELEPHONE ENCOUNTER
----- Message from Alicepeggy Trammell Franklin sent at 11/26/2019  6:36 PM EST -----  Regarding: Non-Urgent Medical Question  Contact: [phone]  Dr Eva Bean,    The day before Easter of this year, I was seen at the [address] for shingles in my left groin area  I was given Valacyclovir, 1,000 mg, 3x/day for 7 days  About 6 weeks later I had the homecare nurse stop in one evening and she saw how the shingles had returned and we re-filled the same prescription  A few days now I have been having itching again in my left groin (the exact same spot) and have been using the Nystatin powder they gave me, but the intense itching, redness, crustiness and wetness is returning again  It also has an unpleasant smell  The intense burning has not begun yet and I hope to catch it before it does  Would it be possible to please refill my prescription at [facility]  Please call if there are any questions  Thank you      Yokasta Hemphill

## 2019-11-27 NOTE — TELEPHONE ENCOUNTER
I lm to make her aware that medication was sent to pharmacy but she should call and make appt to be seen just to make sure it might not be something else

## 2019-12-09 ENCOUNTER — OFFICE VISIT (OUTPATIENT)
Dept: FAMILY MEDICINE CLINIC | Facility: CLINIC | Age: 79
End: 2019-12-09
Payer: COMMERCIAL

## 2019-12-09 VITALS
HEIGHT: 62 IN | OXYGEN SATURATION: 98 % | BODY MASS INDEX: 36.71 KG/M2 | RESPIRATION RATE: 16 BRPM | WEIGHT: 199.5 LBS | DIASTOLIC BLOOD PRESSURE: 80 MMHG | HEART RATE: 68 BPM | TEMPERATURE: 97.8 F | SYSTOLIC BLOOD PRESSURE: 118 MMHG

## 2019-12-09 DIAGNOSIS — I25.10 ARTERIOSCLEROSIS OF CORONARY ARTERY: ICD-10-CM

## 2019-12-09 DIAGNOSIS — R73.01 IMPAIRED FASTING GLUCOSE: Primary | ICD-10-CM

## 2019-12-09 DIAGNOSIS — F32.0 CURRENT MILD EPISODE OF MAJOR DEPRESSIVE DISORDER, UNSPECIFIED WHETHER RECURRENT (HCC): ICD-10-CM

## 2019-12-09 DIAGNOSIS — D45 PV (POLYCYTHEMIA VERA) (HCC): ICD-10-CM

## 2019-12-09 DIAGNOSIS — K21.9 GASTROESOPHAGEAL REFLUX DISEASE WITHOUT ESOPHAGITIS: ICD-10-CM

## 2019-12-09 DIAGNOSIS — E78.5 HYPERLIPIDEMIA, UNSPECIFIED HYPERLIPIDEMIA TYPE: ICD-10-CM

## 2019-12-09 DIAGNOSIS — I10 ESSENTIAL HYPERTENSION: ICD-10-CM

## 2019-12-09 PROCEDURE — 1160F RVW MEDS BY RX/DR IN RCRD: CPT | Performed by: FAMILY MEDICINE

## 2019-12-09 PROCEDURE — 1036F TOBACCO NON-USER: CPT | Performed by: FAMILY MEDICINE

## 2019-12-09 PROCEDURE — 99214 OFFICE O/P EST MOD 30 MIN: CPT | Performed by: FAMILY MEDICINE

## 2019-12-09 PROCEDURE — 3079F DIAST BP 80-89 MM HG: CPT | Performed by: FAMILY MEDICINE

## 2019-12-09 PROCEDURE — 3074F SYST BP LT 130 MM HG: CPT | Performed by: FAMILY MEDICINE

## 2019-12-09 RX ORDER — ATORVASTATIN CALCIUM 20 MG/1
20 TABLET, FILM COATED ORAL DAILY
Qty: 90 TABLET | Refills: 1 | Status: SHIPPED | OUTPATIENT
Start: 2019-12-09 | End: 2020-06-18

## 2019-12-09 RX ORDER — OMEPRAZOLE 20 MG/1
20 CAPSULE, DELAYED RELEASE ORAL DAILY
Qty: 90 CAPSULE | Refills: 1 | Status: SHIPPED | OUTPATIENT
Start: 2019-12-09 | End: 2020-06-17

## 2019-12-09 RX ORDER — METOPROLOL TARTRATE 50 MG/1
25 TABLET, FILM COATED ORAL EVERY 12 HOURS SCHEDULED
Qty: 90 TABLET | Refills: 1 | Status: SHIPPED | OUTPATIENT
Start: 2019-12-09 | End: 2020-06-18

## 2019-12-09 RX ORDER — LOSARTAN POTASSIUM 50 MG/1
50 TABLET ORAL EVERY 24 HOURS
Qty: 90 TABLET | Refills: 1 | Status: SHIPPED | OUTPATIENT
Start: 2019-12-09 | End: 2020-06-25 | Stop reason: SDUPTHER

## 2019-12-09 NOTE — PROGRESS NOTES
Assessment/Plan:  Depression  Stable  Continue same  Will continue to monitor  Hyperlipidemia  Fair control  Continue same  Will continue to monitor her fasting lipid profile  PV (polycythemia vera) (HCC)  Stable  Continue follow-up with Dr Bianca Flower    Arteriosclerosis of coronary artery  Asymptomatic  Stable  Continue to follow up with cardiologist   Continue same medications  Essential hypertension  Well controlled  Continue same  Will continue to monitor  Impaired fasting glucose  It was discussed about low carb diet  Will continue to monitor her A1c    Gastroesophageal reflux disease  Stable  Continue same  Will continue to monitor  Diagnoses and all orders for this visit:    Impaired fasting glucose    Hyperlipidemia, unspecified hyperlipidemia type  -     atorvastatin (LIPITOR) 20 mg tablet; Take 1 tablet (20 mg total) by mouth daily    Essential hypertension  Comments:  stable  Continue same  Will continue to monitor  Orders:  -     losartan (COZAAR) 50 mg tablet; Take 1 tablet (50 mg total) by mouth every 24 hours  -     metoprolol tartrate (LOPRESSOR) 50 mg tablet; Take 0 5 tablets (25 mg total) by mouth every 12 (twelve) hours    Current mild episode of major depressive disorder, unspecified whether recurrent (HCC)  Comments:  Stable  Continue same  Will continue to monitor  Orders:  -     sertraline (ZOLOFT) 50 mg tablet; Take 1 tablet (50 mg total) by mouth daily    Gastroesophageal reflux disease without esophagitis  Comments:  Stable  Continue same  Orders:  -     omeprazole (PriLOSEC) 20 mg delayed release capsule; Take 1 capsule (20 mg total) by mouth daily    PV (polycythemia vera) (HCC)    Arteriosclerosis of coronary artery          There are no Patient Instructions on file for this visit  Return in about 27 weeks (around 6/15/2020)  Subjective:      Patient ID: Kirsten Cao is a 70 y o  female      Chief Complaint   Patient presents with   [place] Hypertension    GERD    Hyperlipidemia       She is here today for follow-up multiple medical problems  She has been taking all her medications  She denies any side effects from her medications  She has been following with Dr Ribeiro and with her cardiologist   She denies any complaint today  The following portions of the patient's history were reviewed and updated as appropriate: allergies, current medications, past family history, past medical history, past social history, past surgical history and problem list     Review of Systems   Constitutional: Negative for chills and fever  HENT: Negative for trouble swallowing  Eyes: Negative for visual disturbance  Respiratory: Negative for cough and shortness of breath  Cardiovascular: Negative for chest pain, palpitations and leg swelling  Gastrointestinal: Negative for abdominal pain, constipation and diarrhea  Endocrine: Negative for cold intolerance and heat intolerance  Genitourinary: Negative for difficulty urinating and dysuria  Musculoskeletal: Negative for gait problem  Skin: Negative for rash  Neurological: Negative for dizziness, tremors, seizures and headaches  Hematological: Negative for adenopathy  Psychiatric/Behavioral: Negative for behavioral problems           Current Outpatient Medications   Medication Sig Dispense Refill    aspirin 81 mg chewable tablet Chew 81 mg daily      atorvastatin (LIPITOR) 20 mg tablet Take 1 tablet (20 mg total) by mouth daily 90 tablet 1    cholecalciferol (VITAMIN D3) 1,000 units tablet Take 1,000 Units by mouth daily      erythromycin with ethanol (THERAMYCIN) 2 % external solution Apply topically daily 60 mL 0    hydroxyurea (HYDREA) 500 mg capsule Take 2 days on and 1 day off 60 capsule 3    hydrOXYzine HCL (ATARAX) 25 mg tablet every 24 hours      losartan (COZAAR) 50 mg tablet Take 1 tablet (50 mg total) by mouth every 24 hours 90 tablet 1    metoprolol tartrate (LOPRESSOR) 50 mg tablet Take 0 5 tablets (25 mg total) by mouth every 12 (twelve) hours 90 tablet 1    mometasone (ELOCON) 0 1 % lotion Apply topically daily 60 mL 0    nystatin (MYCOSTATIN) powder Apply topically 3 (three) times a day as needed (rash) 15 g 0    omeprazole (PriLOSEC) 20 mg delayed release capsule Take 1 capsule (20 mg total) by mouth daily 90 capsule 1    sertraline (ZOLOFT) 50 mg tablet Take 1 tablet (50 mg total) by mouth daily 90 tablet 1    valACYclovir (VALTREX) 1,000 mg tablet Take 1,000 mg by mouth 3 (three) times a day  0    valACYclovir (VALTREX) 1,000 mg tablet Take 1 tablet (1,000 mg total) by mouth 3 (three) times a day for 7 days 21 tablet 0     No current facility-administered medications for this visit  Objective:    /80 (BP Location: Left arm, Patient Position: Sitting, Cuff Size: Adult)   Pulse 68   Temp 97 8 °F (36 6 °C) (Tympanic)   Resp 16   Ht 5' 2" (1 575 m)   Wt 90 5 kg (199 lb 8 oz)   LMP  (LMP Unknown)   SpO2 98%   BMI 36 49 kg/m²        Physical Exam   Constitutional: She is oriented to person, place, and time  She appears well-developed and well-nourished  HENT:   Head: Normocephalic and atraumatic  Eyes: Pupils are equal, round, and reactive to light  EOM are normal    Neck: Normal range of motion  Neck supple  Cardiovascular: Normal rate, regular rhythm and normal heart sounds  Pulmonary/Chest: Effort normal and breath sounds normal    Abdominal: Soft  Bowel sounds are normal    Musculoskeletal: Normal range of motion  She exhibits no edema  Lymphadenopathy:     She has no cervical adenopathy  Neurological: She is alert and oriented to person, place, and time  No cranial nerve deficit  Skin: Skin is warm  Psychiatric: She has a normal mood and affect  Nursing note and vitals reviewed               Duane Ly MD

## 2020-02-04 ENCOUNTER — LAB (OUTPATIENT)
Dept: LAB | Facility: IMAGING CENTER | Age: 80
End: 2020-02-04
Payer: COMMERCIAL

## 2020-02-04 ENCOUNTER — TRANSCRIBE ORDERS (OUTPATIENT)
Dept: ADMINISTRATIVE | Facility: HOSPITAL | Age: 80
End: 2020-02-04

## 2020-02-04 DIAGNOSIS — I12.9 PARENCHYMAL RENAL HYPERTENSION, STAGE 1 THROUGH STAGE 4 OR UNSPECIFIED CHRONIC KIDNEY DISEASE: ICD-10-CM

## 2020-02-04 DIAGNOSIS — N18.30 CHRONIC KIDNEY DISEASE, STAGE III (MODERATE) (HCC): ICD-10-CM

## 2020-02-04 DIAGNOSIS — I73.9 PERIPHERAL VASCULAR DISEASE, UNSPECIFIED (HCC): ICD-10-CM

## 2020-02-04 DIAGNOSIS — N18.30 CHRONIC KIDNEY DISEASE, STAGE III (MODERATE) (HCC): Primary | ICD-10-CM

## 2020-02-04 DIAGNOSIS — E66.9 OBESITY, UNSPECIFIED CLASSIFICATION, UNSPECIFIED OBESITY TYPE, UNSPECIFIED WHETHER SERIOUS COMORBIDITY PRESENT: ICD-10-CM

## 2020-02-04 DIAGNOSIS — E78.5 DYSLIPIDEMIA: ICD-10-CM

## 2020-02-04 LAB
LDH SERPL-CCNC: 234 U/L (ref 81–234)
MAGNESIUM SERPL-MCNC: 2.4 MG/DL (ref 1.6–2.6)
PHOSPHATE SERPL-MCNC: 3.9 MG/DL (ref 2.3–4.1)
URATE SERPL-MCNC: 5.8 MG/DL (ref 2–6.8)

## 2020-02-04 PROCEDURE — 84550 ASSAY OF BLOOD/URIC ACID: CPT

## 2020-02-04 PROCEDURE — 84100 ASSAY OF PHOSPHORUS: CPT

## 2020-02-04 PROCEDURE — 83735 ASSAY OF MAGNESIUM: CPT

## 2020-02-04 PROCEDURE — 83615 LACTATE (LD) (LDH) ENZYME: CPT

## 2020-02-05 ENCOUNTER — APPOINTMENT (OUTPATIENT)
Dept: LAB | Facility: IMAGING CENTER | Age: 80
End: 2020-02-05
Payer: COMMERCIAL

## 2020-02-05 LAB
CREAT UR-MCNC: 126 MG/DL
PROT UR-MCNC: 16 MG/DL
PROT/CREAT UR: 0.13 MG/G{CREAT} (ref 0–0.1)

## 2020-02-05 PROCEDURE — 84156 ASSAY OF PROTEIN URINE: CPT | Performed by: INTERNAL MEDICINE

## 2020-02-05 PROCEDURE — 82570 ASSAY OF URINE CREATININE: CPT | Performed by: INTERNAL MEDICINE

## 2020-02-14 ENCOUNTER — OFFICE VISIT (OUTPATIENT)
Dept: HEMATOLOGY ONCOLOGY | Facility: CLINIC | Age: 80
End: 2020-02-14
Payer: COMMERCIAL

## 2020-02-14 VITALS
RESPIRATION RATE: 16 BRPM | OXYGEN SATURATION: 98 % | DIASTOLIC BLOOD PRESSURE: 72 MMHG | BODY MASS INDEX: 37.02 KG/M2 | SYSTOLIC BLOOD PRESSURE: 158 MMHG | HEIGHT: 62 IN | HEART RATE: 68 BPM | WEIGHT: 201.2 LBS | TEMPERATURE: 98.4 F

## 2020-02-14 DIAGNOSIS — D45 PV (POLYCYTHEMIA VERA) (HCC): Primary | ICD-10-CM

## 2020-02-14 PROCEDURE — 1160F RVW MEDS BY RX/DR IN RCRD: CPT | Performed by: INTERNAL MEDICINE

## 2020-02-14 PROCEDURE — 99214 OFFICE O/P EST MOD 30 MIN: CPT | Performed by: INTERNAL MEDICINE

## 2020-02-14 PROCEDURE — 3078F DIAST BP <80 MM HG: CPT | Performed by: INTERNAL MEDICINE

## 2020-02-14 PROCEDURE — 1036F TOBACCO NON-USER: CPT | Performed by: INTERNAL MEDICINE

## 2020-02-14 PROCEDURE — 3008F BODY MASS INDEX DOCD: CPT | Performed by: INTERNAL MEDICINE

## 2020-02-14 PROCEDURE — 3077F SYST BP >= 140 MM HG: CPT | Performed by: INTERNAL MEDICINE

## 2020-02-14 PROCEDURE — 4040F PNEUMOC VAC/ADMIN/RCVD: CPT | Performed by: INTERNAL MEDICINE

## 2020-02-14 RX ORDER — UBIDECARENONE 75 MG
1000 CAPSULE ORAL
COMMUNITY

## 2020-02-14 NOTE — PROGRESS NOTES
Hematology/Oncology Outpatient Follow-up  Ning Bill 70 y o  female 1940 [id number]    Date:  2/14/2020        Assessment and Plan:  1  [facility]  The patient seems to be taking her Hydrea on regular basis  We will continue with the same does at 500 mg on a daily basis to days on and 1 day off  We will check her blood count now on every 12 week basis since her hemoglobin and hematocrit are in the normal range with hematocrit less than 45%  We will see her again about 6 months from now for close follow-up  - CBC and differential; Standing  - Comprehensive metabolic panel; Standing  - CBC and differential  - Comprehensive metabolic panel        HPI:  The patient came in today for a follow-up visit  She continues to be on the Hydrea 500 mg on a daily basis 2 days on and 1 day off  She is tolerating the treatment relatively well  She did complain about fatigue and multiple other symptoms  Her most recent blood work on 02/04/2020 showed hemoglobin of 14 4 with hematocrit of 44 5%  Her white cells and platelets are within normal range  Her creatinine was 1 27 with normal liver enzymes  Oncology History    Patient had extensive workup for further evaluation of her elevated H&H  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, which is diagnostic for polycythemia vera  She was started on Hydrea 500 mg once a day February 10, 2016  The dose was increased to 500 mg twice a day which resulted in significant drop of her platelet count  We did then decrease the dose and put on hold April 4, 2016  Patient had 2 phlebotomies while she was off the Hydrea  She is currently on Hydrea 500 mg once a day 2 days on and 1 day off and tolerating this dose well  PV (polycythemia vera) ([facility] )    2/3/2016 Initial Diagnosis     PV (polycythemia vera) ([facility])         Interval history:    ROS: Review of Systems   Constitutional: Positive for fatigue   Negative for activity change, appetite change, chills, diaphoresis, fever and unexpected weight change  HENT: Positive for hearing loss and mouth sores  Negative for congestion, dental problem, ear discharge, ear pain, facial swelling, nosebleeds, postnasal drip, sore throat, tinnitus and trouble swallowing  Eyes: Negative for discharge, redness, itching and visual disturbance  Respiratory: Positive for shortness of breath  Negative for cough, chest tightness and wheezing  Cardiovascular: Negative for chest pain, palpitations and leg swelling  Gastrointestinal: Negative for abdominal distention, abdominal pain, anal bleeding, blood in stool, constipation, diarrhea, nausea and vomiting  Genitourinary: Negative for difficulty urinating, dysuria, flank pain, frequency, hematuria and urgency  Musculoskeletal: Negative for arthralgias, back pain, gait problem, joint swelling, myalgias and neck pain  Skin: Positive for rash  Negative for color change, pallor and wound  Neurological: Positive for dizziness, numbness and headaches  Negative for syncope, speech difficulty, weakness and light-headedness  Hematological: Negative for adenopathy  Does not bruise/bleed easily  Psychiatric/Behavioral: Negative for agitation, behavioral problems, confusion and sleep disturbance         Past Medical History:   Diagnosis Date    CAD (coronary artery disease)        Past Surgical History:   Procedure Laterality Date    BREAST BIOPSY      CAROTID ENDARTARECTOMY Left     CHOLECYSTECTOMY      2011 and 2012    HYSTERECTOMY  1981    KNEE CARTILAGE SURGERY Right 2001    OOPHORECTOMY  2010    REPLACEMENT TOTAL KNEE  2001       Social History     Socioeconomic History    Marital status: /Civil Union     Spouse name: None    Number of children: None    Years of education: None    Highest education level: None   Occupational History    None   Social Needs    Financial resource strain: None    Food insecurity:     Worry: None Inability: None    Transportation needs:     Medical: None     Non-medical: None   Tobacco Use    Smoking status: Former Smoker     Types: Cigarettes     Last attempt to quit:      Years since quittin 1    Smokeless tobacco: Never Used    Tobacco comment: no passive smoke exposure   Substance and Sexual Activity    Alcohol use: No    Drug use: No    Sexual activity: None   Lifestyle    Physical activity:     Days per week: None     Minutes per session: None    Stress: None   Relationships    Social connections:     Talks on phone: None     Gets together: None     Attends [employer] service: None     Active member of club or organization: None     Attends meetings of clubs or organizations: None     Relationship status: None    Intimate partner violence:     Fear of current or ex partner: None     Emotionally abused: None     Physically abused: None     Forced sexual activity: None   Other Topics Concern    None   Social History Narrative    None       Family History   Problem Relation Age of Onset    Heart disease Mother     Hypertension Mother     Diabetes type II Mother     Cancer Father     Cystic fibrosis Daughter     Breast cancer Maternal Aunt 48    Breast cancer Cousin 30       Allergies   Allergen Reactions    Oxycodone Diarrhea, Dizziness, GI Intolerance, Headache, Irritability and Tremor     Other reaction(s):  Anxiety, Nausea and/or vomiting, Other (See Comments)  Dizziness      No Active Allergies          Current Outpatient Medications:     aspirin 81 mg chewable tablet, Chew 81 mg daily, Disp: , Rfl:     atorvastatin (LIPITOR) 20 mg tablet, Take 1 tablet (20 mg total) by mouth daily, Disp: 90 tablet, Rfl: 1    cholecalciferol (VITAMIN D3) 1,000 units tablet, Take 1,000 Units by mouth daily, Disp: , Rfl:     cyanocobalamin (VITAMIN B-12) 100 mcg tablet, Take 50 mcg by mouth, Disp: , Rfl:     hydroxyurea (HYDREA) 500 mg capsule, Take 2 days on and 1 day off, Disp: 60 capsule, Rfl: 3    hydrOXYzine HCL (ATARAX) 25 mg tablet, every 24 hours, Disp: , Rfl:     losartan (COZAAR) 50 mg tablet, Take 1 tablet (50 mg total) by mouth every 24 hours, Disp: 90 tablet, Rfl: 1    metoprolol tartrate (LOPRESSOR) 50 mg tablet, Take 0 5 tablets (25 mg total) by mouth every 12 (twelve) hours, Disp: 90 tablet, Rfl: 1    omeprazole (PriLOSEC) 20 mg delayed release capsule, Take 1 capsule (20 mg total) by mouth daily, Disp: 90 capsule, Rfl: 1    sertraline (ZOLOFT) 50 mg tablet, Take 1 tablet (50 mg total) by mouth daily, Disp: 90 tablet, Rfl: 1    erythromycin with ethanol (THERAMYCIN) 2 % external solution, Apply topically daily (Patient not taking: Reported on 2/14/2020), Disp: 60 mL, Rfl: 0    mometasone (ELOCON) 0 1 % lotion, Apply topically daily (Patient not taking: Reported on 2/14/2020), Disp: 60 mL, Rfl: 0    nystatin (MYCOSTATIN) powder, Apply topically 3 (three) times a day as needed (rash) (Patient not taking: Reported on 2/14/2020), Disp: 15 g, Rfl: 0    valACYclovir (VALTREX) 1,000 mg tablet, Take 1,000 mg by mouth 3 (three) times a day, Disp: , Rfl: 0    valACYclovir (VALTREX) 1,000 mg tablet, Take 1 tablet (1,000 mg total) by mouth 3 (three) times a day for 7 days, Disp: 21 tablet, Rfl: 0      Physical Exam:  /72 (BP Location: Left arm, Cuff Size: Adult)   Pulse 68   Temp 98 4 °F (36 9 °C) (Tympanic)   Resp 16   Ht 5' 2" (1 575 m)   Wt 91 3 kg (201 lb 3 2 oz)   LMP  (LMP Unknown)   SpO2 98%   BMI 36 80 kg/m²     Physical Exam   Constitutional: She is oriented to person, place, and time  She appears well-developed and well-nourished  No distress  HENT:   Head: Normocephalic and atraumatic  Nose: Nose normal    Mouth/Throat: Oropharynx is clear and moist    Eyes: Pupils are equal, round, and reactive to light  Conjunctivae and EOM are normal  Right eye exhibits no discharge  Left eye exhibits no discharge  No scleral icterus  Neck: Normal range of motion  Neck supple  No JVD present  No tracheal deviation present  No thyromegaly present  Cardiovascular: Normal rate, regular rhythm and normal heart sounds  Exam reveals no friction rub  No murmur heard  Pulmonary/Chest: Effort normal and breath sounds normal  No stridor  No respiratory distress  She has no wheezes  She has no rales  She exhibits no tenderness  Abdominal: Soft  Bowel sounds are normal  She exhibits no distension and no mass  There is no hepatosplenomegaly, splenomegaly or hepatomegaly  There is no tenderness  There is no rebound and no guarding  Musculoskeletal: Normal range of motion  She exhibits no edema, tenderness or deformity  Lymphadenopathy:     She has no cervical adenopathy  Neurological: She is alert and oriented to person, place, and time  She has normal reflexes  No cranial nerve deficit  Coordination normal    Skin: Skin is warm and dry  No rash noted  She is not diaphoretic  No erythema  No pallor  Psychiatric: She has a normal mood and affect  Her behavior is normal  Judgment and thought content normal          Labs:  Lab Results   Component Value Date    WBC 5 48 02/04/2020    HGB 14 4 02/04/2020    HCT 44 5 02/04/2020     (H) 02/04/2020     02/04/2020     Lab Results   Component Value Date    K 4 5 02/04/2020     (H) 02/04/2020    CO2 31 02/04/2020    BUN 23 02/04/2020    CREATININE 1 27 02/04/2020    GLUF 112 (H) 02/04/2020    CALCIUM 9 5 02/04/2020    AST 17 02/04/2020    ALT 26 02/04/2020    ALKPHOS 89 02/04/2020    EGFR 41 02/04/2020     Lab Results   Component Value Date    TSH 1 64 04/26/2018       Patient voiced understanding and agreement in the above discussion  Aware to contact our office with questions/symptoms in the interim

## 2020-03-06 LAB
LEFT EYE DIABETIC RETINOPATHY: NORMAL
RIGHT EYE DIABETIC RETINOPATHY: NORMAL

## 2020-03-25 DIAGNOSIS — D45 PV (POLYCYTHEMIA VERA) (HCC): ICD-10-CM

## 2020-03-25 RX ORDER — HYDROXYUREA 500 MG/1
CAPSULE ORAL
Qty: 180 CAPSULE | Refills: 1 | Status: SHIPPED | OUTPATIENT
Start: 2020-03-25 | End: 2021-02-22

## 2020-06-03 ENCOUNTER — TELEPHONE (OUTPATIENT)
Dept: FAMILY MEDICINE CLINIC | Facility: CLINIC | Age: 80
End: 2020-06-03

## 2020-06-16 DIAGNOSIS — K21.9 GASTROESOPHAGEAL REFLUX DISEASE WITHOUT ESOPHAGITIS: ICD-10-CM

## 2020-06-16 DIAGNOSIS — F32.0 CURRENT MILD EPISODE OF MAJOR DEPRESSIVE DISORDER, UNSPECIFIED WHETHER RECURRENT (HCC): ICD-10-CM

## 2020-06-17 RX ORDER — OMEPRAZOLE 20 MG/1
CAPSULE, DELAYED RELEASE ORAL
Qty: 90 CAPSULE | Refills: 1 | Status: SHIPPED | OUTPATIENT
Start: 2020-06-17 | End: 2020-12-18

## 2020-06-18 DIAGNOSIS — E78.5 HYPERLIPIDEMIA, UNSPECIFIED HYPERLIPIDEMIA TYPE: ICD-10-CM

## 2020-06-18 DIAGNOSIS — I10 ESSENTIAL HYPERTENSION: ICD-10-CM

## 2020-06-18 RX ORDER — ATORVASTATIN CALCIUM 20 MG/1
TABLET, FILM COATED ORAL
Qty: 90 TABLET | Refills: 1 | Status: SHIPPED | OUTPATIENT
Start: 2020-06-18 | End: 2020-08-20 | Stop reason: ALTCHOICE

## 2020-06-18 RX ORDER — METOPROLOL TARTRATE 50 MG/1
TABLET, FILM COATED ORAL
Qty: 90 TABLET | Refills: 1 | Status: SHIPPED | OUTPATIENT
Start: 2020-06-18 | End: 2020-12-18

## 2020-06-25 ENCOUNTER — OFFICE VISIT (OUTPATIENT)
Dept: FAMILY MEDICINE CLINIC | Facility: CLINIC | Age: 80
End: 2020-06-25
Payer: COMMERCIAL

## 2020-06-25 VITALS
RESPIRATION RATE: 16 BRPM | DIASTOLIC BLOOD PRESSURE: 66 MMHG | WEIGHT: 199.25 LBS | OXYGEN SATURATION: 97 % | TEMPERATURE: 97.9 F | HEART RATE: 72 BPM | SYSTOLIC BLOOD PRESSURE: 128 MMHG | HEIGHT: 62 IN | BODY MASS INDEX: 36.67 KG/M2

## 2020-06-25 DIAGNOSIS — I10 ESSENTIAL HYPERTENSION: Primary | ICD-10-CM

## 2020-06-25 DIAGNOSIS — R73.01 IMPAIRED FASTING GLUCOSE: ICD-10-CM

## 2020-06-25 DIAGNOSIS — Z00.00 HEALTHCARE MAINTENANCE: ICD-10-CM

## 2020-06-25 DIAGNOSIS — K21.9 GASTROESOPHAGEAL REFLUX DISEASE WITHOUT ESOPHAGITIS: ICD-10-CM

## 2020-06-25 DIAGNOSIS — L40.9 PSORIASIS: ICD-10-CM

## 2020-06-25 DIAGNOSIS — E78.49 OTHER HYPERLIPIDEMIA: ICD-10-CM

## 2020-06-25 DIAGNOSIS — L08.9 SKIN INFECTION: ICD-10-CM

## 2020-06-25 PROCEDURE — 3074F SYST BP LT 130 MM HG: CPT | Performed by: FAMILY MEDICINE

## 2020-06-25 PROCEDURE — G0439 PPPS, SUBSEQ VISIT: HCPCS | Performed by: FAMILY MEDICINE

## 2020-06-25 PROCEDURE — 4040F PNEUMOC VAC/ADMIN/RCVD: CPT | Performed by: FAMILY MEDICINE

## 2020-06-25 PROCEDURE — 1036F TOBACCO NON-USER: CPT | Performed by: FAMILY MEDICINE

## 2020-06-25 PROCEDURE — 3008F BODY MASS INDEX DOCD: CPT | Performed by: FAMILY MEDICINE

## 2020-06-25 PROCEDURE — 1125F AMNT PAIN NOTED PAIN PRSNT: CPT | Performed by: FAMILY MEDICINE

## 2020-06-25 PROCEDURE — 1170F FXNL STATUS ASSESSED: CPT | Performed by: FAMILY MEDICINE

## 2020-06-25 PROCEDURE — 3078F DIAST BP <80 MM HG: CPT | Performed by: FAMILY MEDICINE

## 2020-06-25 PROCEDURE — 99214 OFFICE O/P EST MOD 30 MIN: CPT | Performed by: FAMILY MEDICINE

## 2020-06-25 PROCEDURE — 1160F RVW MEDS BY RX/DR IN RCRD: CPT | Performed by: FAMILY MEDICINE

## 2020-06-25 RX ORDER — ERYTHROMYCIN 20 MG/ML
SOLUTION TOPICAL DAILY
Qty: 60 ML | Refills: 0 | Status: SHIPPED | OUTPATIENT
Start: 2020-06-25 | End: 2022-04-08

## 2020-06-25 RX ORDER — MOMETASONE FUROATE 1 MG/ML
SOLUTION TOPICAL DAILY
Qty: 60 ML | Refills: 0 | Status: SHIPPED | OUTPATIENT
Start: 2020-06-25 | End: 2022-04-08

## 2020-06-25 RX ORDER — LOSARTAN POTASSIUM 50 MG/1
50 TABLET ORAL EVERY 24 HOURS
Qty: 90 TABLET | Refills: 1 | Status: SHIPPED | OUTPATIENT
Start: 2020-06-25 | End: 2020-12-18

## 2020-06-26 ENCOUNTER — APPOINTMENT (OUTPATIENT)
Dept: LAB | Facility: IMAGING CENTER | Age: 80
End: 2020-06-26
Payer: COMMERCIAL

## 2020-06-26 DIAGNOSIS — I10 ESSENTIAL HYPERTENSION: ICD-10-CM

## 2020-06-26 DIAGNOSIS — R73.01 IMPAIRED FASTING GLUCOSE: ICD-10-CM

## 2020-06-26 LAB
ALBUMIN SERPL BCP-MCNC: 3.5 G/DL (ref 3.5–5)
ALP SERPL-CCNC: 94 U/L (ref 46–116)
ALT SERPL W P-5'-P-CCNC: 24 U/L (ref 12–78)
ANION GAP SERPL CALCULATED.3IONS-SCNC: 7 MMOL/L (ref 4–13)
AST SERPL W P-5'-P-CCNC: 16 U/L (ref 5–45)
BASOPHILS # BLD AUTO: 0.06 THOUSANDS/ΜL (ref 0–0.1)
BASOPHILS NFR BLD AUTO: 1 % (ref 0–1)
BILIRUB SERPL-MCNC: 0.54 MG/DL (ref 0.2–1)
BUN SERPL-MCNC: 18 MG/DL (ref 5–25)
CALCIUM SERPL-MCNC: 9.1 MG/DL (ref 8.3–10.1)
CHLORIDE SERPL-SCNC: 111 MMOL/L (ref 100–108)
CHOLEST SERPL-MCNC: 134 MG/DL (ref 50–200)
CO2 SERPL-SCNC: 27 MMOL/L (ref 21–32)
CREAT SERPL-MCNC: 1.2 MG/DL (ref 0.6–1.3)
EOSINOPHIL # BLD AUTO: 0.19 THOUSAND/ΜL (ref 0–0.61)
EOSINOPHIL NFR BLD AUTO: 3 % (ref 0–6)
ERYTHROCYTE [DISTWIDTH] IN BLOOD BY AUTOMATED COUNT: 15.1 % (ref 11.6–15.1)
EST. AVERAGE GLUCOSE BLD GHB EST-MCNC: 108 MG/DL
GFR SERPL CREATININE-BSD FRML MDRD: 44 ML/MIN/1.73SQ M
GLUCOSE P FAST SERPL-MCNC: 94 MG/DL (ref 65–99)
HBA1C MFR BLD: 5.4 %
HCT VFR BLD AUTO: 45.5 % (ref 34.8–46.1)
HDLC SERPL-MCNC: 47 MG/DL
HGB BLD-MCNC: 14.5 G/DL (ref 11.5–15.4)
IMM GRANULOCYTES # BLD AUTO: 0.03 THOUSAND/UL (ref 0–0.2)
IMM GRANULOCYTES NFR BLD AUTO: 1 % (ref 0–2)
LDLC SERPL CALC-MCNC: 54 MG/DL (ref 0–100)
LYMPHOCYTES # BLD AUTO: 1.19 THOUSANDS/ΜL (ref 0.6–4.47)
LYMPHOCYTES NFR BLD AUTO: 19 % (ref 14–44)
MCH RBC QN AUTO: 34.2 PG (ref 26.8–34.3)
MCHC RBC AUTO-ENTMCNC: 31.9 G/DL (ref 31.4–37.4)
MCV RBC AUTO: 107 FL (ref 82–98)
MONOCYTES # BLD AUTO: 0.28 THOUSAND/ΜL (ref 0.17–1.22)
MONOCYTES NFR BLD AUTO: 5 % (ref 4–12)
NEUTROPHILS # BLD AUTO: 4.42 THOUSANDS/ΜL (ref 1.85–7.62)
NEUTS SEG NFR BLD AUTO: 71 % (ref 43–75)
NRBC BLD AUTO-RTO: 0 /100 WBCS
PLATELET # BLD AUTO: 169 THOUSANDS/UL (ref 149–390)
PMV BLD AUTO: 10.7 FL (ref 8.9–12.7)
POTASSIUM SERPL-SCNC: 4 MMOL/L (ref 3.5–5.3)
PROT SERPL-MCNC: 7.1 G/DL (ref 6.4–8.2)
RBC # BLD AUTO: 4.24 MILLION/UL (ref 3.81–5.12)
SODIUM SERPL-SCNC: 145 MMOL/L (ref 136–145)
TRIGL SERPL-MCNC: 165 MG/DL
TSH SERPL DL<=0.05 MIU/L-ACNC: 1.62 UIU/ML (ref 0.36–3.74)
WBC # BLD AUTO: 6.17 THOUSAND/UL (ref 4.31–10.16)

## 2020-06-26 PROCEDURE — 80053 COMPREHEN METABOLIC PANEL: CPT | Performed by: INTERNAL MEDICINE

## 2020-06-26 PROCEDURE — 36415 COLL VENOUS BLD VENIPUNCTURE: CPT | Performed by: INTERNAL MEDICINE

## 2020-06-26 PROCEDURE — 84443 ASSAY THYROID STIM HORMONE: CPT

## 2020-06-26 PROCEDURE — 85025 COMPLETE CBC W/AUTO DIFF WBC: CPT | Performed by: INTERNAL MEDICINE

## 2020-06-26 PROCEDURE — 80061 LIPID PANEL: CPT

## 2020-06-26 PROCEDURE — 83036 HEMOGLOBIN GLYCOSYLATED A1C: CPT

## 2020-07-08 ENCOUNTER — TELEPHONE (OUTPATIENT)
Dept: FAMILY MEDICINE CLINIC | Facility: CLINIC | Age: 80
End: 2020-07-08

## 2020-07-08 NOTE — TELEPHONE ENCOUNTER
----- Message from Kirby Alvarez MD sent at 7/5/2020 12:27 PM EDT -----  BW showed TG is slightly elevated   A!C and TSH are normal

## 2020-08-05 ENCOUNTER — APPOINTMENT (OUTPATIENT)
Dept: LAB | Facility: IMAGING CENTER | Age: 80
End: 2020-08-05
Payer: COMMERCIAL

## 2020-08-14 ENCOUNTER — OFFICE VISIT (OUTPATIENT)
Dept: HEMATOLOGY ONCOLOGY | Facility: CLINIC | Age: 80
End: 2020-08-14
Payer: COMMERCIAL

## 2020-08-14 VITALS
OXYGEN SATURATION: 98 % | HEART RATE: 69 BPM | HEIGHT: 62 IN | SYSTOLIC BLOOD PRESSURE: 126 MMHG | DIASTOLIC BLOOD PRESSURE: 70 MMHG | TEMPERATURE: 97.2 F | WEIGHT: 198 LBS | BODY MASS INDEX: 36.44 KG/M2 | RESPIRATION RATE: 18 BRPM

## 2020-08-14 DIAGNOSIS — D45 PV (POLYCYTHEMIA VERA) (HCC): Primary | ICD-10-CM

## 2020-08-14 DIAGNOSIS — D51.8 OTHER VITAMIN B12 DEFICIENCY ANEMIAS: ICD-10-CM

## 2020-08-14 DIAGNOSIS — I73.9 PERIPHERAL VASCULAR DISEASE (HCC): ICD-10-CM

## 2020-08-14 PROCEDURE — 3074F SYST BP LT 130 MM HG: CPT | Performed by: NURSE PRACTITIONER

## 2020-08-14 PROCEDURE — 1160F RVW MEDS BY RX/DR IN RCRD: CPT | Performed by: NURSE PRACTITIONER

## 2020-08-14 PROCEDURE — 1036F TOBACCO NON-USER: CPT | Performed by: NURSE PRACTITIONER

## 2020-08-14 PROCEDURE — 3008F BODY MASS INDEX DOCD: CPT | Performed by: NURSE PRACTITIONER

## 2020-08-14 PROCEDURE — 3078F DIAST BP <80 MM HG: CPT | Performed by: NURSE PRACTITIONER

## 2020-08-14 PROCEDURE — 4040F PNEUMOC VAC/ADMIN/RCVD: CPT | Performed by: NURSE PRACTITIONER

## 2020-08-14 PROCEDURE — 99214 OFFICE O/P EST MOD 30 MIN: CPT | Performed by: NURSE PRACTITIONER

## 2020-08-14 NOTE — PROGRESS NOTES
Hematology/Oncology Outpatient Follow-up  Lanita Boas 70 y o  female 1940 [id number]    Date:  8/14/2020      Assessment and Plan:  1  PV (polycythemia vera) ([facility] [address] )  Patient is tolerating her current dose of hydroxyurea 500 mg 2 days on and 1 day off which seems to be maintaining her hematocrit levels around 45%  We will continue her on the current dose without any changes  She will continue to get her blood work done on an every three-month basis  Will follow up again in the office in about 6 months     - CBC and differential; Standing  - Comprehensive metabolic panel; Standing  - LD,Blood; Future  - CBC and differential  - Comprehensive metabolic panel    2  Other vitamin B12 deficiency anemias  Patient will continue her oral supplementation  Will repeat her B12 level prior to her next follow-up  - Vitamin B12; Future    3  Peripheral vascular disease [facility])  Patient is asking if we can send her to a another vascular surgeon regarding her peripheral vascular disease/leg pain  She was being followed on a regular basis in the past by Dr Parveen Briggs who recently retired  We will refer her as per her request     - Ambulatory referral to Vascular Surgery; Future    HPI:  Patient presents today for a follow-up visit accompanied by her   She states that she was experiencing some leg cramps which resolved after her PCP stopped her Lipitor  She continues to have chronic complaints including mild headaches and dizziness due to her inner ear issue/vertigo, chronic pruritus which is typically relieved with Benadryl as needed and chronic back pain  She reports compliance with her hydroxyurea 500 mg 2 days on and 1 day off which she is tolerating well  Is also taking a vitamin B12 supplement on a daily basis  Her most recent laboratory studies from 08/05/2020 showed normal white cells and platelets, H&H 84/21 5, MCV elevated as expected 105   Fasting glucose 105 remaining metabolic panel normal for patient  Her hematocrit has been around 45% over the past 6 months  Oncology History   Patient had extensive workup for further evaluation of her elevated H&H  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, which is diagnostic for polycythemia vera  She was started on Hydrea 500 mg once a day February 10, 2016  The dose was increased to 500 mg twice a day which resulted in significant drop of her platelet count  We did then decrease the dose and put on hold April 4, 2016  Patient had 2 phlebotomies while she was off the Hydrea  She is currently on Hydrea 500 mg once a day 2 days on and 1 day off and tolerating this dose well  PV (polycythemia vera) ([facility] [address] )   2/3/2016 Initial Diagnosis    PV (polycythemia vera) ([facility])         Interval history:    ROS: Review of Systems   Constitutional: Positive for fatigue (mild)  Negative for activity change, appetite change, chills, fever and unexpected weight change  HENT: Positive for hearing loss and mouth sores  Negative for congestion, nosebleeds, sore throat and trouble swallowing  Eyes: Negative  Respiratory: Positive for shortness of breath  Negative for cough and chest tightness  Cardiovascular: Negative for chest pain, palpitations and leg swelling  Gastrointestinal: Negative for abdominal distention, abdominal pain, blood in stool, constipation, diarrhea, nausea and vomiting  Genitourinary: Negative for difficulty urinating, dysuria, frequency, hematuria and urgency  Musculoskeletal: Positive for back pain (3/10)  Negative for arthralgias, gait problem, joint swelling and myalgias  Skin: Negative for color change, pallor and rash  Generalized pruritus especially to arms and face-chronic   Neurological: Positive for dizziness, numbness (And tingling moderate) and headaches  Negative for weakness and light-headedness  Hematological: Negative for adenopathy  Does not bruise/bleed easily     Psychiatric/Behavioral: Positive for sleep disturbance  Negative for dysphoric mood  The patient is not nervous/anxious          Past Medical History:   Diagnosis Date    CAD (coronary artery disease)        Past Surgical History:   Procedure Laterality Date    BREAST BIOPSY      CAROTID ENDARTARECTOMY Left     CHOLECYSTECTOMY       and 2012    HYSTERECTOMY  1981    KNEE CARTILAGE SURGERY Right 2001    OOPHORECTOMY  2010    REPLACEMENT TOTAL KNEE  2001       Social History     Socioeconomic History    Marital status: /Civil Union     Spouse name: None    Number of children: None    Years of education: None    Highest education level: None   Occupational History    None   Social Needs    Financial resource strain: None    Food insecurity     Worry: None     Inability: None    Transportation needs     Medical: None     Non-medical: None   Tobacco Use    Smoking status: Former Smoker     Types: Cigarettes     Last attempt to quit:      Years since quittin 6    Smokeless tobacco: Never Used    Tobacco comment: no passive smoke exposure   Substance and Sexual Activity    Alcohol use: No    Drug use: No    Sexual activity: None   Lifestyle    Physical activity     Days per week: None     Minutes per session: None    Stress: None   Relationships    Social connections     Talks on phone: None     Gets together: None     Attends Yazdanism service: None     Active member of club or organization: None     Attends meetings of clubs or organizations: None     Relationship status: None    Intimate partner violence     Fear of current or ex partner: None     Emotionally abused: None     Physically abused: None     Forced sexual activity: None   Other Topics Concern    None   Social History Narrative    None       Family History   Problem Relation Age of Onset    Heart disease Mother     Hypertension Mother     Diabetes type II Mother     Cancer Father     Cystic fibrosis Daughter     Breast cancer Maternal Aunt 48    Breast cancer Cousin 30       Allergies   Allergen Reactions    Oxycodone Diarrhea, Dizziness, GI Intolerance, Headache, Irritability and Tremor     Other reaction(s):  Anxiety, Nausea and/or vomiting, Other (See Comments)  Dizziness      No Active Allergies          Current Outpatient Medications:     aspirin 81 mg chewable tablet, Chew 81 mg daily, Disp: , Rfl:     cholecalciferol (VITAMIN D3) 1,000 units tablet, Take 1,000 Units by mouth daily, Disp: , Rfl:     cyanocobalamin (VITAMIN B-12) 100 mcg tablet, Take 1,000 mcg by mouth, Disp: , Rfl:     erythromycin with ethanol (THERAMYCIN) 2 % external solution, Apply topically daily, Disp: 60 mL, Rfl: 0    hydroxyurea (HYDREA) 500 mg capsule, TAKE 2 DAYS ON AND 1 DAY OFF, Disp: 180 capsule, Rfl: 1    hydrOXYzine HCL (ATARAX) 25 mg tablet, every 24 hours, Disp: , Rfl:     losartan (COZAAR) 50 mg tablet, Take 1 tablet (50 mg total) by mouth every 24 hours, Disp: 90 tablet, Rfl: 1    metoprolol tartrate (LOPRESSOR) 50 mg tablet, TAKE 1/2 TABLET EVERY 12 HOURS, Disp: 90 tablet, Rfl: 1    metroNIDAZOLE (METROCREAM) 0 75 % cream, Apply topically 2 (two) times a day, Disp: 45 g, Rfl: 0    mometasone (ELOCON) 0 1 % lotion, Apply topically daily, Disp: 60 mL, Rfl: 0    omeprazole (PriLOSEC) 20 mg delayed release capsule, TAKE 1 CAPSULE BY MOUTH EVERY DAY, Disp: 90 capsule, Rfl: 1    sertraline (ZOLOFT) 50 mg tablet, TAKE 1 TABLET BY MOUTH EVERY DAY, Disp: 90 tablet, Rfl: 1    valACYclovir (VALTREX) 1,000 mg tablet, Take 1,000 mg by mouth 3 (three) times a day, Disp: , Rfl: 0    atorvastatin (LIPITOR) 20 mg tablet, TAKE 1 TABLET DAILY (Patient not taking: Reported on 8/14/2020), Disp: 90 tablet, Rfl: 1    nystatin (MYCOSTATIN) powder, Apply topically 3 (three) times a day as needed (rash) (Patient not taking: Reported on 2/14/2020), Disp: 15 g, Rfl: 0    valACYclovir (VALTREX) 1,000 mg tablet, Take 1 tablet (1,000 mg total) by mouth 3 (three) times a day for 7 days, Disp: 21 tablet, Rfl: 0      Physical Exam:  /70 (BP Location: Left arm, Patient Position: Sitting, Cuff Size: Adult)   Pulse 69   Temp (!) 97 2 °F (36 2 °C) (Tympanic)   Resp 18   Ht 5' 2" (1 575 m)   Wt 89 8 kg (198 lb)   LMP  (LMP Unknown)   SpO2 98%   BMI 36 21 kg/m²     Physical Exam  Vitals signs reviewed  Constitutional:       General: She is not in acute distress  Appearance: She is well-developed  She is not diaphoretic  HENT:      Head: Normocephalic and atraumatic  Eyes:      General: No scleral icterus  Conjunctiva/sclera: Conjunctivae normal       Pupils: Pupils are equal, round, and reactive to light  Neck:      Musculoskeletal: Normal range of motion and neck supple  Thyroid: No thyromegaly  Cardiovascular:      Rate and Rhythm: Normal rate and regular rhythm  Heart sounds: Normal heart sounds  No murmur  Pulmonary:      Effort: Pulmonary effort is normal  No respiratory distress  Breath sounds: Normal breath sounds  Abdominal:      General: There is no distension  Palpations: Abdomen is soft  There is no hepatomegaly or splenomegaly  Tenderness: There is no abdominal tenderness  Musculoskeletal: Normal range of motion  Right lower leg: Edema (trace) present  Left lower leg: Edema (trace) present  Lymphadenopathy:      Cervical: No cervical adenopathy  Upper Body:      Right upper body: No axillary adenopathy  Left upper body: No axillary adenopathy  Skin:     General: Skin is warm and dry  Findings: No erythema or rash  Neurological:      General: No focal deficit present  Mental Status: She is alert and oriented to person, place, and time  Psychiatric:         Mood and Affect: Mood and affect normal          Behavior: Behavior normal  Behavior is cooperative  Thought Content:  Thought content normal          Judgment: Judgment normal            Labs:  Lab Results   Component Value Date    WBC 5 53 08/05/2020    HGB 15 0 08/05/2020    HCT 45 8 08/05/2020     (H) 08/05/2020     08/05/2020     Lab Results   Component Value Date    K 4 5 08/05/2020     (H) 08/05/2020    CO2 29 08/05/2020    BUN 20 08/05/2020    CREATININE 1 18 08/05/2020    GLUF 105 (H) 08/05/2020    CALCIUM 9 2 08/05/2020    AST 18 08/05/2020    ALT 26 08/05/2020    ALKPHOS 94 08/05/2020    EGFR 45 08/05/2020       Patient voiced understanding and agreement in the above discussion  Aware to contact our office with questions/symptoms in the interim  This note has been generated by voice recognition software system  Therefore, there may be spelling, grammar, and or syntax errors  Please contact if questions arise

## 2020-08-20 ENCOUNTER — CONSULT (OUTPATIENT)
Dept: VASCULAR SURGERY | Facility: CLINIC | Age: 80
End: 2020-08-20
Payer: COMMERCIAL

## 2020-08-20 VITALS
WEIGHT: 199 LBS | HEIGHT: 62 IN | HEART RATE: 70 BPM | SYSTOLIC BLOOD PRESSURE: 136 MMHG | TEMPERATURE: 98.3 F | DIASTOLIC BLOOD PRESSURE: 74 MMHG | RESPIRATION RATE: 18 BRPM | BODY MASS INDEX: 36.62 KG/M2

## 2020-08-20 DIAGNOSIS — I73.9 PERIPHERAL VASCULAR DISEASE (HCC): Primary | ICD-10-CM

## 2020-08-20 DIAGNOSIS — S85.001S INJURY OF RIGHT POPLITEAL ARTERY, SEQUELA: ICD-10-CM

## 2020-08-20 PROBLEM — I77.9 PAOD (PERIPHERAL ARTERIAL OCCLUSIVE DISEASE) (HCC): Status: ACTIVE | Noted: 2020-08-20

## 2020-08-20 PROBLEM — S85.001A INJURY OF RIGHT POPLITEAL ARTERY: Status: ACTIVE | Noted: 2020-08-20

## 2020-08-20 PROCEDURE — 1036F TOBACCO NON-USER: CPT | Performed by: SURGERY

## 2020-08-20 PROCEDURE — 1160F RVW MEDS BY RX/DR IN RCRD: CPT | Performed by: SURGERY

## 2020-08-20 PROCEDURE — 3078F DIAST BP <80 MM HG: CPT | Performed by: SURGERY

## 2020-08-20 PROCEDURE — 99203 OFFICE O/P NEW LOW 30 MIN: CPT | Performed by: SURGERY

## 2020-08-20 PROCEDURE — 3075F SYST BP GE 130 - 139MM HG: CPT | Performed by: SURGERY

## 2020-08-20 RX ORDER — DIPHENHYDRAMINE HCL 25 MG
CAPSULE ORAL
COMMUNITY
End: 2022-04-08

## 2020-08-20 RX ORDER — FEXOFENADINE HCL 180 MG/1
TABLET ORAL
COMMUNITY
End: 2022-04-08

## 2020-08-20 NOTE — PATIENT INSTRUCTIONS
Peripheral Artery Disease   AMBULATORY CARE:   Peripheral artery disease (PAD)  is narrow, weak, or blocked arteries  It may affect any arteries outside of your heart and brain  PAD is usually the result of a buildup of fat and cholesterol, also called plaque, along your artery walls  Inflammation, a blood clot, or abnormal cell growth could also block your arteries  PAD prevents normal blood flow to your legs and arms  You are at risk of an amputation if poor blood flow keeps wounds from healing or causes gangrene (tissue death)  Without treatment, PAD can also cause a heart attack or stroke  Common symptoms include:  Mild PAD usually does not cause symptoms  As the disease worsens over time, you may have the following:  · Pain or cramps in your leg or hip while you walk     · A numb, weak, or heavy feeling in your legs     · Dry, scaly, red, or pale skin on your legs     · Thick or brittle nails, or hair loss on your arms and legs     · Foot sores that will not heal     · Burning or aching in your feet and toes while resting (this may be worse when you lie down)  Call 911 for the following:   · You have any of the following signs of a heart attack:      ¨ Squeezing, pressure, or pain in your chest that lasts longer than 5 minutes or returns    ¨ Discomfort or pain in your back, neck, jaw, stomach, or arm     ¨ Trouble breathing    ¨ Nausea or vomiting    ¨ Lightheadedness or a sudden cold sweat, especially with chest pain or trouble breathing    · You have any of the following signs of a stroke:      ¨ Numbness or drooping on one side of your face     ¨ Weakness in an arm or leg    ¨ Confusion or difficulty speaking    ¨ Dizziness, a severe headache, or vision loss  Seek care immediately if:   · You have sores or wounds that will not heal      · You notice black or discolored skin on your arm or leg  · Your skin is cool to the touch    Contact your healthcare provider if:   · You have leg pain when you walk 1/8 mile (200 meters) or less, even with treatment  · Your legs are red, dry, or pale, even with treatment  · You have questions or concerns about your condition or care  Treatment for PAD  can help reduce your risk of a heart attack, stroke, or amputation  You may need more than one of the following:  · Medicines  may be given to prevent blood clots and reduce the risk of a heart attack or stroke  You may be given medicine to help prevent your PAD from getting worse  · A supervised exercise program  helps you stay active in normal daily activities and may prevent disability  Healthcare providers will help you safely walk or do strength training exercises 3 times a week for 30 to 60 minutes  You will do this for several months, then transition to walking on your own  · Angioplasty  is a procedure to open your artery so blood can flow through normally  A thin tube called a catheter is used to insert a small balloon into your artery  The balloon is inflated to open your blocked artery, and then removed  A tube called a stent may be placed in your artery to hold it open  · Bypass surgery  is used to make a new connection to your artery with a vein from another part of your body, or an artificial graft  The vein or graft is attached to your artery above and below your blockage  This allows blood to flow around the blocked portion of your artery  Manage and prevent PAD:   · Walk for 30 to 60 minutes at least 4 times a week  Your healthcare provider may also refer you to an supervised exercise program  The program helps increase how far you can walk without pain  It also helps you stay active in normal daily activities and may prevent disability caused by PAD  · Do not smoke  Nicotine and other chemicals in cigarettes and cigars can worsen PAD  They can also increase your risk for a heart attack or stroke  Ask your healthcare provider for information if you currently smoke and need help to quit  E-cigarettes or smokeless tobacco still contain nicotine  Talk to your healthcare provider before you use these products  · Manage other health conditions  Take your medicines as directed and follow your healthcare provider's instructions if you have high blood pressure or high cholesterol  Perform foot care and check your blood sugar levels as directed if you have diabetes  · Eat heart healthy foods  Eat whole grains, fruits, and vegetables every day  Limit salt and high-fat foods  Ask your healthcare provider for more information on a heart healthy diet  Ask if you need to lose weight  Your healthcare provider can help you create a healthy weight-loss plan  Follow up with your healthcare provider as directed:  Write down your questions so you remember to ask them during your visits  © 2017 [facility] Information is for End User's use only and may not be sold, redistributed or otherwise used for commercial purposes  All illustrations and images included in CareNotes® are the copyrighted property of A D A M [employer] Inc  or [employer]  The above information is an  only  It is not intended as medical advice for individual conditions or treatments  Talk to your doctor, nurse or pharmacist before following any medical regimen to see if it is safe and effective for you

## 2020-08-20 NOTE — PROGRESS NOTES
Assessment/Plan:    Injury of right popliteal artery  Elizabeth Encarnacion is a pleasant 53-year-old woman who presents to the office to establish care  She is a former patient of Dr Sarah Gilmore  History remarkable for right above knee pop to below-knee popliteal bypass back in 2005 by Dr Sarah Glimore has result of complications (iatrogenic popliteal artery injury) during total knee replacement  Prolonged postoperative course/wound care including VAC therapy  Will obtain baseline lower extremity arterial duplex  She recalls last time bypass was surveyed  was approximately 3 years ago  Clinically her right lower extremity is warm well perfused with good PT Doppler signal        Diagnoses and all orders for this visit:    Peripheral vascular disease ([facility] [address] )  -     Ambulatory referral to Vascular Surgery  -     VAS lower limb arterial duplex, complete bilateral; Future    Injury of right popliteal artery, sequela  -     VAS lower limb arterial duplex, complete bilateral; Future    Other orders  -     dextran 70-hypromellose (Artificial Tears) 0 1-0 3 % ophthalmic solution; Apply topically  -     fexofenadine (ALLEGRA) 180 MG tablet; Take by mouth          Subjective:      Patient ID: Hoda Ordonez is a 68 y o  female  New patient referred by JULIO Vega  Patient reports having R leg numbness that is constant  Patient reports the numbness in the R leg started occurring after her R knee replacement 15 years ago  Patient reports she gets swelling in her legs at times  Patient denies any wounds or ulcers  Patient has no testing at this time  Patient is taking aspirin  Lennox Jill is a pleasant 53-year-old woman who is referred to our office to establish vascular care  She is a former patient of Dr Sarah Gilmore  She has history of right above knee to below knee popliteal artery bypass back in 2005 secondary to iatrogenic popliteal artery injury during total knee replacement surgery  She denies any claudication/rest pain  She does report chronic numbness of the right lower leg  She has no tissue loss  The following portions of the patient's history were reviewed and updated as appropriate: allergies, current medications, past family history, past medical history, past social history, past surgical history and problem list     Review of Systems   Constitutional: Negative  Negative for chills and fever  HENT: Negative  Negative for sinus pain, sore throat and trouble swallowing  Eyes: Negative  Negative for pain, redness and itching  Respiratory: Negative  Negative for cough, chest tightness and shortness of breath  Cardiovascular: Positive for leg swelling (sometimes)  Gastrointestinal: Negative  Negative for diarrhea, nausea and vomiting  Endocrine: Negative  Genitourinary: Negative  Negative for difficulty urinating and pelvic pain  Musculoskeletal: Negative  Negative for gait problem  Skin: Negative  Negative for wound  Allergic/Immunologic: Negative  Neurological: Positive for numbness (R leg)  Negative for speech difficulty and headaches  Hematological: Bruises/bleeds easily  Psychiatric/Behavioral: Negative  Negative for self-injury  I have personally reviewed the ROS entered by MA and agree as documented  Objective:      /74 (BP Location: Left arm, Patient Position: Sitting, Cuff Size: Adult)   Pulse 70   Temp 98 3 °F (36 8 °C) (Tympanic)   Resp 18   Ht 5' 2" (1 575 m)   Wt 90 3 kg (199 lb)   LMP  (LMP Unknown)   BMI 36 40 kg/m²          Physical Exam  Constitutional:       General: She is not in acute distress  Appearance: She is well-developed  HENT:      Head: Normocephalic and atraumatic  Eyes:      General: No scleral icterus  Conjunctiva/sclera: Conjunctivae normal    Neck:      Musculoskeletal: Normal range of motion and neck supple  Trachea: No tracheal deviation  Cardiovascular:      Rate and Rhythm: Normal rate and regular rhythm  Heart sounds: Normal heart sounds  Comments: Good multiphasic right PT Doppler signal  Pulmonary:      Effort: Pulmonary effort is normal       Breath sounds: Normal breath sounds  Abdominal:      General: There is no distension  Palpations: Abdomen is soft  There is no mass (no appreciable aortic pulsation/aneurysm)  Tenderness: There is no abdominal tenderness  There is no guarding or rebound  Musculoskeletal: Normal range of motion  Right lower leg: Edema (Appears isolated to the right ankle and distal lower leg) present  Skin:     General: Skin is warm and dry  Comments: Well-healed right total knee replacement incision as well as incision along the medial aspect of the right leg corresponding to the above knee to below knee popliteal artery bypass   Neurological:      Mental Status: She is alert and oriented to person, place, and time     Psychiatric:         Behavior: Behavior normal

## 2020-08-20 NOTE — ASSESSMENT & PLAN NOTE
Antoinette Hooper is a pleasant 33-year-old woman who presents to the office to establish care  She is a former patient of Dr Rosa Foote  History remarkable for right above knee pop to below-knee popliteal bypass back in 2005 by Dr Rosa Foote has result of complications (iatrogenic popliteal artery injury) during total knee replacement  Prolonged postoperative course/wound care including VAC therapy  Will obtain baseline lower extremity arterial duplex  She recalls last time bypass was surveyed  was approximately 3 years ago      Clinically her right lower extremity is warm well perfused with good PT Doppler signal

## 2020-09-11 ENCOUNTER — HOSPITAL ENCOUNTER (OUTPATIENT)
Dept: NON INVASIVE DIAGNOSTICS | Facility: CLINIC | Age: 80
Discharge: HOME | End: 2020-09-11
Payer: COMMERCIAL

## 2020-09-11 DIAGNOSIS — S85.001S INJURY OF RIGHT POPLITEAL ARTERY, SEQUELA: ICD-10-CM

## 2020-09-11 DIAGNOSIS — I73.9 PERIPHERAL VASCULAR DISEASE (HCC): ICD-10-CM

## 2020-09-11 PROCEDURE — 93925 LOWER EXTREMITY STUDY: CPT | Performed by: SURGERY

## 2020-09-11 PROCEDURE — 93925 LOWER EXTREMITY STUDY: CPT

## 2020-09-11 PROCEDURE — 93922 UPR/L XTREMITY ART 2 LEVELS: CPT | Performed by: SURGERY

## 2020-09-11 PROCEDURE — 93923 UPR/LXTR ART STDY 3+ LVLS: CPT

## 2020-11-18 ENCOUNTER — LAB (OUTPATIENT)
Dept: LAB | Facility: IMAGING CENTER | Age: 80
End: 2020-11-18
Payer: COMMERCIAL

## 2020-11-18 LAB
ALBUMIN SERPL BCP-MCNC: 3.6 G/DL (ref 3.5–5)
ALP SERPL-CCNC: 82 U/L (ref 46–116)
ALT SERPL W P-5'-P-CCNC: 22 U/L (ref 12–78)
ANION GAP SERPL CALCULATED.3IONS-SCNC: 6 MMOL/L (ref 4–13)
AST SERPL W P-5'-P-CCNC: 16 U/L (ref 5–45)
BASOPHILS # BLD AUTO: 0.04 THOUSANDS/ΜL (ref 0–0.1)
BASOPHILS NFR BLD AUTO: 1 % (ref 0–1)
BILIRUB SERPL-MCNC: 0.43 MG/DL (ref 0.2–1)
BUN SERPL-MCNC: 22 MG/DL (ref 5–25)
CALCIUM SERPL-MCNC: 9.3 MG/DL (ref 8.3–10.1)
CHLORIDE SERPL-SCNC: 108 MMOL/L (ref 100–108)
CO2 SERPL-SCNC: 29 MMOL/L (ref 21–32)
CREAT SERPL-MCNC: 1.19 MG/DL (ref 0.6–1.3)
EOSINOPHIL # BLD AUTO: 0.1 THOUSAND/ΜL (ref 0–0.61)
EOSINOPHIL NFR BLD AUTO: 2 % (ref 0–6)
ERYTHROCYTE [DISTWIDTH] IN BLOOD BY AUTOMATED COUNT: 14.5 % (ref 11.6–15.1)
GFR SERPL CREATININE-BSD FRML MDRD: 44 ML/MIN/1.73SQ M
GLUCOSE P FAST SERPL-MCNC: 102 MG/DL (ref 65–99)
HCT VFR BLD AUTO: 44.5 % (ref 34.8–46.1)
HGB BLD-MCNC: 14.2 G/DL (ref 11.5–15.4)
IMM GRANULOCYTES # BLD AUTO: 0.02 THOUSAND/UL (ref 0–0.2)
IMM GRANULOCYTES NFR BLD AUTO: 0 % (ref 0–2)
LYMPHOCYTES # BLD AUTO: 1.04 THOUSANDS/ΜL (ref 0.6–4.47)
LYMPHOCYTES NFR BLD AUTO: 21 % (ref 14–44)
MCH RBC QN AUTO: 34.1 PG (ref 26.8–34.3)
MCHC RBC AUTO-ENTMCNC: 31.9 G/DL (ref 31.4–37.4)
MCV RBC AUTO: 107 FL (ref 82–98)
MONOCYTES # BLD AUTO: 0.3 THOUSAND/ΜL (ref 0.17–1.22)
MONOCYTES NFR BLD AUTO: 6 % (ref 4–12)
NEUTROPHILS # BLD AUTO: 3.57 THOUSANDS/ΜL (ref 1.85–7.62)
NEUTS SEG NFR BLD AUTO: 70 % (ref 43–75)
NRBC BLD AUTO-RTO: 0 /100 WBCS
PLATELET # BLD AUTO: 163 THOUSANDS/UL (ref 149–390)
PMV BLD AUTO: 10.1 FL (ref 8.9–12.7)
POTASSIUM SERPL-SCNC: 4.2 MMOL/L (ref 3.5–5.3)
PROT SERPL-MCNC: 7 G/DL (ref 6.4–8.2)
RBC # BLD AUTO: 4.16 MILLION/UL (ref 3.81–5.12)
SODIUM SERPL-SCNC: 143 MMOL/L (ref 136–145)
WBC # BLD AUTO: 5.07 THOUSAND/UL (ref 4.31–10.16)

## 2020-11-18 PROCEDURE — 85025 COMPLETE CBC W/AUTO DIFF WBC: CPT | Performed by: NURSE PRACTITIONER

## 2020-11-18 PROCEDURE — 80053 COMPREHEN METABOLIC PANEL: CPT | Performed by: NURSE PRACTITIONER

## 2020-11-18 PROCEDURE — 36415 COLL VENOUS BLD VENIPUNCTURE: CPT | Performed by: NURSE PRACTITIONER

## 2020-12-18 DIAGNOSIS — F32.0 CURRENT MILD EPISODE OF MAJOR DEPRESSIVE DISORDER, UNSPECIFIED WHETHER RECURRENT (HCC): ICD-10-CM

## 2020-12-18 DIAGNOSIS — K21.9 GASTROESOPHAGEAL REFLUX DISEASE WITHOUT ESOPHAGITIS: ICD-10-CM

## 2020-12-18 DIAGNOSIS — I10 ESSENTIAL HYPERTENSION: ICD-10-CM

## 2020-12-18 RX ORDER — LOSARTAN POTASSIUM 50 MG/1
TABLET ORAL
Qty: 90 TABLET | Refills: 1 | Status: SHIPPED | OUTPATIENT
Start: 2020-12-18 | End: 2021-06-15

## 2020-12-18 RX ORDER — METOPROLOL TARTRATE 50 MG/1
TABLET, FILM COATED ORAL
Qty: 90 TABLET | Refills: 1 | Status: SHIPPED | OUTPATIENT
Start: 2020-12-18 | End: 2021-06-15

## 2020-12-18 RX ORDER — OMEPRAZOLE 20 MG/1
CAPSULE, DELAYED RELEASE ORAL
Qty: 90 CAPSULE | Refills: 1 | Status: SHIPPED | OUTPATIENT
Start: 2020-12-18 | End: 2022-05-02 | Stop reason: SDUPTHER

## 2020-12-21 PROBLEM — E11.3292 TYPE 2 DIABETES MELLITUS WITH MILD NONPROLIFERATIVE RETINOPATHY OF LEFT EYE WITHOUT MACULAR EDEMA (HCC): Status: ACTIVE | Noted: 2020-12-21

## 2020-12-21 PROBLEM — N18.30 CHRONIC KIDNEY DISEASE, STAGE III (MODERATE) (HCC): Status: ACTIVE | Noted: 2020-12-21

## 2020-12-29 ENCOUNTER — OFFICE VISIT (OUTPATIENT)
Dept: FAMILY MEDICINE CLINIC | Facility: CLINIC | Age: 80
End: 2020-12-29
Payer: COMMERCIAL

## 2020-12-29 ENCOUNTER — HOSPITAL ENCOUNTER (OUTPATIENT)
Dept: RADIOLOGY | Facility: IMAGING CENTER | Age: 80
Discharge: HOME | End: 2020-12-29
Payer: COMMERCIAL

## 2020-12-29 VITALS
WEIGHT: 201.38 LBS | TEMPERATURE: 98.5 F | HEIGHT: 62 IN | BODY MASS INDEX: 37.06 KG/M2 | RESPIRATION RATE: 16 BRPM | DIASTOLIC BLOOD PRESSURE: 78 MMHG | HEART RATE: 67 BPM | OXYGEN SATURATION: 98 % | SYSTOLIC BLOOD PRESSURE: 138 MMHG

## 2020-12-29 DIAGNOSIS — G89.29 CHRONIC PAIN OF LEFT ANKLE: ICD-10-CM

## 2020-12-29 DIAGNOSIS — M25.572 CHRONIC PAIN OF LEFT ANKLE: ICD-10-CM

## 2020-12-29 DIAGNOSIS — I77.9 PAOD (PERIPHERAL ARTERIAL OCCLUSIVE DISEASE) (HCC): ICD-10-CM

## 2020-12-29 DIAGNOSIS — E11.9 DIABETES MELLITUS WITHOUT COMPLICATION (HCC): ICD-10-CM

## 2020-12-29 DIAGNOSIS — I10 ESSENTIAL HYPERTENSION: Primary | ICD-10-CM

## 2020-12-29 DIAGNOSIS — L30.9 DERMATITIS: ICD-10-CM

## 2020-12-29 DIAGNOSIS — R73.01 IMPAIRED FASTING GLUCOSE: ICD-10-CM

## 2020-12-29 DIAGNOSIS — N18.31 STAGE 3A CHRONIC KIDNEY DISEASE (HCC): ICD-10-CM

## 2020-12-29 DIAGNOSIS — D45 POLYCYTHEMIA VERA (HCC): ICD-10-CM

## 2020-12-29 DIAGNOSIS — E78.49 OTHER HYPERLIPIDEMIA: ICD-10-CM

## 2020-12-29 DIAGNOSIS — K21.9 GASTROESOPHAGEAL REFLUX DISEASE WITHOUT ESOPHAGITIS: ICD-10-CM

## 2020-12-29 DIAGNOSIS — Z13.820 OSTEOPOROSIS SCREENING: ICD-10-CM

## 2020-12-29 LAB — SL AMB POCT HEMOGLOBIN AIC: 5.5 (ref ?–6.5)

## 2020-12-29 PROCEDURE — 3078F DIAST BP <80 MM HG: CPT | Performed by: FAMILY MEDICINE

## 2020-12-29 PROCEDURE — 1036F TOBACCO NON-USER: CPT | Performed by: FAMILY MEDICINE

## 2020-12-29 PROCEDURE — 83036 HEMOGLOBIN GLYCOSYLATED A1C: CPT | Performed by: FAMILY MEDICINE

## 2020-12-29 PROCEDURE — T1015 CLINIC SERVICE: HCPCS | Performed by: FAMILY MEDICINE

## 2020-12-29 PROCEDURE — 99214 OFFICE O/P EST MOD 30 MIN: CPT | Performed by: FAMILY MEDICINE

## 2020-12-29 PROCEDURE — 73610 X-RAY EXAM OF ANKLE: CPT

## 2020-12-29 PROCEDURE — 3075F SYST BP GE 130 - 139MM HG: CPT | Performed by: FAMILY MEDICINE

## 2020-12-29 PROCEDURE — 1160F RVW MEDS BY RX/DR IN RCRD: CPT | Performed by: FAMILY MEDICINE

## 2020-12-29 RX ORDER — ROSUVASTATIN CALCIUM 5 MG/1
5 TABLET, COATED ORAL DAILY
Qty: 90 TABLET | Refills: 1 | Status: SHIPPED | OUTPATIENT
Start: 2020-12-29 | End: 2021-05-24 | Stop reason: SDUPTHER

## 2020-12-29 RX ORDER — CLOTRIMAZOLE AND BETAMETHASONE DIPROPIONATE 10; .64 MG/G; MG/G
CREAM TOPICAL 2 TIMES DAILY
Qty: 30 G | Refills: 0 | Status: SHIPPED | OUTPATIENT
Start: 2020-12-29 | End: 2022-04-08

## 2020-12-30 ENCOUNTER — APPOINTMENT (OUTPATIENT)
Dept: LAB | Facility: IMAGING CENTER | Age: 80
End: 2020-12-30
Payer: COMMERCIAL

## 2020-12-30 DIAGNOSIS — R73.01 IMPAIRED FASTING GLUCOSE: ICD-10-CM

## 2020-12-30 DIAGNOSIS — E78.49 OTHER HYPERLIPIDEMIA: ICD-10-CM

## 2020-12-30 DIAGNOSIS — I10 ESSENTIAL HYPERTENSION: ICD-10-CM

## 2020-12-30 PROBLEM — L30.9 DERMATITIS: Status: ACTIVE | Noted: 2020-12-30

## 2020-12-30 PROBLEM — G89.29 CHRONIC PAIN OF LEFT ANKLE: Status: ACTIVE | Noted: 2020-12-30

## 2020-12-30 PROBLEM — N18.30 STAGE 3 CHRONIC KIDNEY DISEASE (HCC): Status: ACTIVE | Noted: 2020-02-18

## 2020-12-30 PROBLEM — M25.572 CHRONIC PAIN OF LEFT ANKLE: Status: ACTIVE | Noted: 2020-12-30

## 2020-12-30 LAB
ALBUMIN SERPL BCP-MCNC: 3.7 G/DL (ref 3.5–5)
ALP SERPL-CCNC: 86 U/L (ref 46–116)
ALT SERPL W P-5'-P-CCNC: 24 U/L (ref 12–78)
ANION GAP SERPL CALCULATED.3IONS-SCNC: 2 MMOL/L (ref 4–13)
AST SERPL W P-5'-P-CCNC: 21 U/L (ref 5–45)
BASOPHILS # BLD AUTO: 0.05 THOUSANDS/ΜL (ref 0–0.1)
BASOPHILS NFR BLD AUTO: 1 % (ref 0–1)
BILIRUB SERPL-MCNC: 0.46 MG/DL (ref 0.2–1)
BUN SERPL-MCNC: 23 MG/DL (ref 5–25)
CALCIUM SERPL-MCNC: 9.6 MG/DL (ref 8.3–10.1)
CHLORIDE SERPL-SCNC: 111 MMOL/L (ref 100–108)
CHOLEST SERPL-MCNC: 212 MG/DL (ref 50–200)
CO2 SERPL-SCNC: 32 MMOL/L (ref 21–32)
CREAT SERPL-MCNC: 1.21 MG/DL (ref 0.6–1.3)
EOSINOPHIL # BLD AUTO: 0.13 THOUSAND/ΜL (ref 0–0.61)
EOSINOPHIL NFR BLD AUTO: 2 % (ref 0–6)
ERYTHROCYTE [DISTWIDTH] IN BLOOD BY AUTOMATED COUNT: 14.9 % (ref 11.6–15.1)
EST. AVERAGE GLUCOSE BLD GHB EST-MCNC: 105 MG/DL
GFR SERPL CREATININE-BSD FRML MDRD: 43 ML/MIN/1.73SQ M
GLUCOSE P FAST SERPL-MCNC: 97 MG/DL (ref 65–99)
HBA1C MFR BLD: 5.3 %
HCT VFR BLD AUTO: 45.6 % (ref 34.8–46.1)
HDLC SERPL-MCNC: 44 MG/DL
HGB BLD-MCNC: 14.2 G/DL (ref 11.5–15.4)
IMM GRANULOCYTES # BLD AUTO: 0.02 THOUSAND/UL (ref 0–0.2)
IMM GRANULOCYTES NFR BLD AUTO: 0 % (ref 0–2)
LDLC SERPL CALC-MCNC: 130 MG/DL (ref 0–100)
LYMPHOCYTES # BLD AUTO: 1.04 THOUSANDS/ΜL (ref 0.6–4.47)
LYMPHOCYTES NFR BLD AUTO: 18 % (ref 14–44)
MCH RBC QN AUTO: 33.5 PG (ref 26.8–34.3)
MCHC RBC AUTO-ENTMCNC: 31.1 G/DL (ref 31.4–37.4)
MCV RBC AUTO: 108 FL (ref 82–98)
MONOCYTES # BLD AUTO: 0.3 THOUSAND/ΜL (ref 0.17–1.22)
MONOCYTES NFR BLD AUTO: 5 % (ref 4–12)
NEUTROPHILS # BLD AUTO: 4.18 THOUSANDS/ΜL (ref 1.85–7.62)
NEUTS SEG NFR BLD AUTO: 74 % (ref 43–75)
NRBC BLD AUTO-RTO: 0 /100 WBCS
PLATELET # BLD AUTO: 155 THOUSANDS/UL (ref 149–390)
PMV BLD AUTO: 10 FL (ref 8.9–12.7)
POTASSIUM SERPL-SCNC: 4.5 MMOL/L (ref 3.5–5.3)
PROT SERPL-MCNC: 7.5 G/DL (ref 6.4–8.2)
RBC # BLD AUTO: 4.24 MILLION/UL (ref 3.81–5.12)
SODIUM SERPL-SCNC: 145 MMOL/L (ref 136–145)
TRIGL SERPL-MCNC: 192 MG/DL
TSH SERPL DL<=0.05 MIU/L-ACNC: 1.32 UIU/ML (ref 0.36–3.74)
WBC # BLD AUTO: 5.72 THOUSAND/UL (ref 4.31–10.16)

## 2020-12-30 PROCEDURE — 80061 LIPID PANEL: CPT

## 2020-12-30 PROCEDURE — 84443 ASSAY THYROID STIM HORMONE: CPT

## 2020-12-30 PROCEDURE — 83036 HEMOGLOBIN GLYCOSYLATED A1C: CPT

## 2020-12-30 PROCEDURE — 80053 COMPREHEN METABOLIC PANEL: CPT

## 2020-12-30 PROCEDURE — 85025 COMPLETE CBC W/AUTO DIFF WBC: CPT

## 2020-12-30 PROCEDURE — 36415 COLL VENOUS BLD VENIPUNCTURE: CPT

## 2021-01-05 ENCOUNTER — HOSPITAL ENCOUNTER (OUTPATIENT)
Dept: RADIOLOGY | Facility: IMAGING CENTER | Age: 81
Discharge: HOME | End: 2021-01-05
Payer: COMMERCIAL

## 2021-01-05 DIAGNOSIS — Z13.820 OSTEOPOROSIS SCREENING: ICD-10-CM

## 2021-01-05 PROCEDURE — 77080 DXA BONE DENSITY AXIAL: CPT

## 2021-01-06 ENCOUNTER — TELEPHONE (OUTPATIENT)
Dept: FAMILY MEDICINE CLINIC | Facility: CLINIC | Age: 81
End: 2021-01-06

## 2021-01-06 NOTE — TELEPHONE ENCOUNTER
Patient left a message stating that she had an ankle xray that showed some abnormality and she has been trying to stay off it and using ice but  Is having a lot of pain and wonders what else she can do

## 2021-01-08 ENCOUNTER — TELEPHONE (OUTPATIENT)
Dept: FAMILY MEDICINE CLINIC | Facility: CLINIC | Age: 81
End: 2021-01-08

## 2021-01-08 NOTE — TELEPHONE ENCOUNTER
----- Message from [employer] sent at 1/8/2021  3:30 PM EST -----  Blood work showed high cholesterol  All other labs stable  Bone density scan was normal   Xray of right ankle was normal and showed no fracture

## 2021-01-20 DIAGNOSIS — Z23 ENCOUNTER FOR IMMUNIZATION: ICD-10-CM

## 2021-01-22 ENCOUNTER — TELEPHONE (OUTPATIENT)
Dept: FAMILY MEDICINE CLINIC | Facility: CLINIC | Age: 81
End: 2021-01-22

## 2021-01-22 NOTE — TELEPHONE ENCOUNTER
Informed pt that covid vaccine schedule at [facility] and [facility] were booked until end of February and she will call next month

## 2021-02-10 ENCOUNTER — LAB (OUTPATIENT)
Dept: LAB | Facility: IMAGING CENTER | Age: 81
End: 2021-02-10
Payer: COMMERCIAL

## 2021-02-10 DIAGNOSIS — D45 PV (POLYCYTHEMIA VERA) (HCC): ICD-10-CM

## 2021-02-10 DIAGNOSIS — D51.8 OTHER VITAMIN B12 DEFICIENCY ANEMIAS: ICD-10-CM

## 2021-02-10 LAB
LDH SERPL-CCNC: 219 U/L (ref 81–234)
VIT B12 SERPL-MCNC: 2007 PG/ML (ref 100–900)

## 2021-02-10 PROCEDURE — 82607 VITAMIN B-12: CPT

## 2021-02-10 PROCEDURE — 83615 LACTATE (LD) (LDH) ENZYME: CPT

## 2021-02-17 ENCOUNTER — TRANSCRIBE ORDERS (OUTPATIENT)
Dept: ADMINISTRATIVE | Facility: HOSPITAL | Age: 81
End: 2021-02-17

## 2021-02-17 ENCOUNTER — LAB (OUTPATIENT)
Dept: LAB | Facility: IMAGING CENTER | Age: 81
End: 2021-02-17
Payer: COMMERCIAL

## 2021-02-17 DIAGNOSIS — N18.30 STAGE 3 CHRONIC KIDNEY DISEASE, UNSPECIFIED WHETHER STAGE 3A OR 3B CKD (HCC): ICD-10-CM

## 2021-02-17 DIAGNOSIS — N18.30 STAGE 3 CHRONIC KIDNEY DISEASE, UNSPECIFIED WHETHER STAGE 3A OR 3B CKD (HCC): Primary | ICD-10-CM

## 2021-02-17 LAB
25(OH)D3 SERPL-MCNC: 30.2 NG/ML (ref 30–100)
ALBUMIN SERPL BCP-MCNC: 3.8 G/DL (ref 3.5–5)
ANION GAP SERPL CALCULATED.3IONS-SCNC: 6 MMOL/L (ref 4–13)
BUN SERPL-MCNC: 24 MG/DL (ref 5–25)
CALCIUM SERPL-MCNC: 10.1 MG/DL (ref 8.3–10.1)
CHLORIDE SERPL-SCNC: 109 MMOL/L (ref 100–108)
CO2 SERPL-SCNC: 29 MMOL/L (ref 21–32)
CREAT SERPL-MCNC: 1.4 MG/DL (ref 0.6–1.3)
CREAT UR-MCNC: 63.6 MG/DL
ERYTHROCYTE [DISTWIDTH] IN BLOOD BY AUTOMATED COUNT: 14.7 % (ref 11.6–15.1)
GFR SERPL CREATININE-BSD FRML MDRD: 36 ML/MIN/1.73SQ M
GLUCOSE P FAST SERPL-MCNC: 96 MG/DL (ref 65–99)
HCT VFR BLD AUTO: 46.4 % (ref 34.8–46.1)
HGB BLD-MCNC: 14.7 G/DL (ref 11.5–15.4)
MCH RBC QN AUTO: 33.8 PG (ref 26.8–34.3)
MCHC RBC AUTO-ENTMCNC: 31.7 G/DL (ref 31.4–37.4)
MCV RBC AUTO: 107 FL (ref 82–98)
PHOSPHATE SERPL-MCNC: 3.3 MG/DL (ref 2.3–4.1)
PLATELET # BLD AUTO: 167 THOUSANDS/UL (ref 149–390)
PMV BLD AUTO: 9.9 FL (ref 8.9–12.7)
POTASSIUM SERPL-SCNC: 4.4 MMOL/L (ref 3.5–5.3)
PROT UR-MCNC: <6 MG/DL
PROT/CREAT UR: <0.09 MG/G{CREAT} (ref 0–0.1)
PTH-INTACT SERPL-MCNC: 96.9 PG/ML (ref 18.4–80.1)
RBC # BLD AUTO: 4.35 MILLION/UL (ref 3.81–5.12)
SODIUM SERPL-SCNC: 144 MMOL/L (ref 136–145)
URATE SERPL-MCNC: 6.2 MG/DL (ref 2–6.8)
WBC # BLD AUTO: 5.69 THOUSAND/UL (ref 4.31–10.16)

## 2021-02-17 PROCEDURE — 80069 RENAL FUNCTION PANEL: CPT

## 2021-02-17 PROCEDURE — 83970 ASSAY OF PARATHORMONE: CPT

## 2021-02-17 PROCEDURE — 82306 VITAMIN D 25 HYDROXY: CPT

## 2021-02-17 PROCEDURE — 36415 COLL VENOUS BLD VENIPUNCTURE: CPT

## 2021-02-17 PROCEDURE — 84156 ASSAY OF PROTEIN URINE: CPT

## 2021-02-17 PROCEDURE — 84550 ASSAY OF BLOOD/URIC ACID: CPT

## 2021-02-17 PROCEDURE — 85027 COMPLETE CBC AUTOMATED: CPT

## 2021-02-17 PROCEDURE — 82570 ASSAY OF URINE CREATININE: CPT

## 2021-02-19 ENCOUNTER — TELEPHONE (OUTPATIENT)
Dept: HEMATOLOGY ONCOLOGY | Facility: CLINIC | Age: 81
End: 2021-02-19

## 2021-02-19 ENCOUNTER — TELEMEDICINE (OUTPATIENT)
Dept: HEMATOLOGY ONCOLOGY | Facility: CLINIC | Age: 81
End: 2021-02-19
Payer: COMMERCIAL

## 2021-02-19 DIAGNOSIS — D45 PV (POLYCYTHEMIA VERA) (HCC): Primary | ICD-10-CM

## 2021-02-19 DIAGNOSIS — D51.8 OTHER VITAMIN B12 DEFICIENCY ANEMIAS: ICD-10-CM

## 2021-02-19 DIAGNOSIS — N28.9 RENAL DYSFUNCTION: ICD-10-CM

## 2021-02-19 PROCEDURE — 99214 OFFICE O/P EST MOD 30 MIN: CPT | Performed by: INTERNAL MEDICINE

## 2021-02-19 NOTE — PROGRESS NOTES
Virtual Brief Visit    Assessment/Plan:  1  PV (polycythemia vera) ([facility] )    The patient was encouraged to continue on the current dose of the Hydrea 500 mg daily to 2 days on and 1 day off to keep her hematocrit level less than 45% which is the case at this point  She is tolerating the treatment  Without obvious side effects  We will continue to monitor her blood count on every 3 months basis and see her again about 6 months for close follow-up  - CBC and differential; Future  - Comprehensive metabolic panel; Future  - LD,Blood; Future    2  Other vitamin B12 deficiency anemias    She continues to take vitamin B12 supplements  - CBC and differential; Future  - Vitamin B12; Future    3  Renal dysfunction    She was encouraged to follow-up with the nephrology team regarding her rising creatinine level for further evaluation   - Comprehensive metabolic panel; Future    Problem List Items Addressed This Visit        Other    Other vitamin B12 deficiency anemias    Relevant Orders    CBC and differential    Vitamin B12      Other Visit Diagnoses     PV (polycythemia vera) ([facility] )    -  Primary    Relevant Orders    CBC and differential    Comprehensive metabolic panel    LD,Blood    Renal dysfunction        Relevant Orders    Comprehensive metabolic panel                Reason for visit is   Chief Complaint   Patient presents with    Virtual Brief Visit        Encounter provider Chepe Orta MD    Provider located at [address] [id number]    Recent Visits  No visits were found meeting these conditions  Showing recent visits within past 7 days and meeting all other requirements     Today's Visits  Date Type Provider Dept   02/19/21 Anum Patton MD [facility]   Showing today's visits and meeting all other requirements     Future Appointments  No visits were found meeting these conditions  Showing future appointments within next 150 days and meeting all other requirements        After connecting through , the patient was identified by name and date of birth  Karrie Ames was informed that this is a telemedicine visit and that the visit is being conducted through   She acknowledged consent and understanding of privacy and security of the platform  The patient has agreed to participate and understands she can discontinue the visit at any time  Patient is aware this is a billable service  Subjective    Karrie Ames is a 68 y o  female  HPI    the patient stated that she  Continues to take the Hydrea 500 mg 2 days on and 1 day off without significant side effects  Her statin was recently changed to Crestor by her PCP  She also is in the process of being evaluated by the nephrologist since she seems to have worsening of her kidney function  She also went to the vascular surgical team who is monitoring her closely  Her most recent blood work from 02/10/2021 showed hemoglobin of 14 3 with hematocrit of 44 5%  MCV of 107 with normal white cells and platelets  The white cell differential is within normal range  Creatinine 1 4 with normal liver enzymes and electrolytes  Her vitamin B12 level was 2007, LDH normal, uric acid normal vitamin D level was 30, intact PTH was slightly above normal 96 9  She denies any constitutional symptoms  Oncology History    Patient had extensive workup for further evaluation of her elevated H&H  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, which is diagnostic for polycythemia vera      She was started on Hydrea 500 mg once a day February 10, 2016  The dose was increased to 500 mg twice a day which resulted in significant drop of her platelet count  We did then decrease the dose and put on hold April 4, 2016  Patient had 2 phlebotomies while she was off the Hydrea    She is currently on Hydrea 500 mg once a day 2 days on and 1 day off and tolerating this dose well          PV (polycythemia vera) ([address] )    2/3/2016 Initial Diagnosis      PV [facility]polycythemia vera) ([facility])            Past Medical History:   Diagnosis Date    CAD (coronary artery disease)        Past Surgical History:   Procedure Laterality Date    BREAST BIOPSY      CAROTID ENDARTARECTOMY Left     CHOLECYSTECTOMY      2011 and 2012    HYSTERECTOMY  1981    KNEE CARTILAGE SURGERY Right 2001    OOPHORECTOMY  2010    REPLACEMENT TOTAL KNEE  2001       Current Outpatient Medications   Medication Sig Dispense Refill    aspirin 81 mg chewable tablet Chew 81 mg daily      cholecalciferol (VITAMIN D3) 1,000 units tablet Take 1,000 Units by mouth daily      clotrimazole-betamethasone (LOTRISONE) 1-0 05 % cream Apply topically 2 (two) times a day 30 g 0    cyanocobalamin (VITAMIN B-12) 100 mcg tablet Take 1,000 mcg by mouth      dextran 70-hypromellose (Artificial Tears) 0 1-0 3 % ophthalmic solution Apply topically      erythromycin with ethanol (THERAMYCIN) 2 % external solution Apply topically daily 60 mL 0    fexofenadine (ALLEGRA) 180 MG tablet Take by mouth      hydroxyurea (HYDREA) 500 mg capsule TAKE 2 DAYS ON AND 1 DAY  capsule 1    hydrOXYzine HCL (ATARAX) 25 mg tablet every 24 hours      losartan (COZAAR) 50 mg tablet TAKE 1 TABLET DAILY 90 tablet 1    metoprolol tartrate (LOPRESSOR) 50 mg tablet TAKE 1/2 TABLET BY MOUTH EVERY 12 HOURS 90 tablet 1    metroNIDAZOLE (METROCREAM) 0 75 % cream Apply topically 2 (two) times a day 45 g 0    mometasone (ELOCON) 0 1 % lotion Apply topically daily 60 mL 0    nystatin (MYCOSTATIN) powder Apply topically 3 (three) times a day as needed (rash) 15 g 0    omeprazole (PriLOSEC) 20 mg delayed release capsule TAKE 1 CAPSULE BY MOUTH EVERY DAY 90 capsule 1    rosuvastatin (CRESTOR) 5 mg tablet Take 1 tablet (5 mg total) by mouth daily 90 tablet 1    sertraline (ZOLOFT) 50 mg tablet TAKE 1 TABLET BY MOUTH EVERY DAY 90 tablet 1    valACYclovir (VALTREX) 1,000 mg tablet Take 1,000 mg by mouth 3 (three) times a day  0     No current facility-administered medications for this visit  Allergies   Allergen Reactions    Oxycodone Diarrhea, Dizziness, GI Intolerance, Headache, Irritability and Tremor     Other reaction(s): Anxiety, Nausea and/or vomiting, Other (See Comments)  Dizziness      No Active Allergies        Review of Systems   Constitutional: Negative for chills and fever  HENT: Negative for ear pain and sore throat  Eyes: Negative for pain and visual disturbance  Respiratory: Negative for cough and shortness of breath  Cardiovascular: Negative for chest pain and palpitations  Gastrointestinal: Negative for abdominal pain and vomiting  Genitourinary: Negative for dysuria and hematuria  Musculoskeletal: Negative for arthralgias and back pain  Skin: Negative for color change and rash  Neurological: Negative for seizures and syncope  All other systems reviewed and are negative  There were no vitals filed for this visit  VIRTUAL VISIT DISCLAIMER    Colleen Gandhi acknowledges that she has consented to an online visit or consultation  She understands that the online visit is based solely on information provided by her, and that, in the absence of a face-to-face physical evaluation by the physician, the diagnosis she receives is both limited and provisional in terms of accuracy and completeness  This is not intended to replace a full medical face-to-face evaluation by the physician  Colleen Gandhi understands and accepts these terms

## 2021-02-19 NOTE — TELEPHONE ENCOUNTER
Patient had virtual apt today w/Dr Eri Lawrence  Called pt and scheduled f/u apt for Friday 08/20/2021 at 10:00 a m  w/Lauren Cheryle Clay CRNP at the [address] [place] [address]

## 2021-02-19 NOTE — Clinical Note
Please schedule the patient for blood work in 3 months and for follow-up in 6 months with blood work prior

## 2021-02-19 NOTE — PROGRESS NOTES
Hematology/Oncology Outpatient Follow-up  Do Ayala 68 y o  female 1940 [id number]    Date:  2021        Assessment and Plan:        HPI:    Oncology History Overview Note   Patient had extensive workup for further evaluation of her elevated H&H  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, which is diagnostic for polycythemia vera  She was started on Hydrea 500 mg once a day February 10, 2016  The dose was increased to 500 mg twice a day which resulted in significant drop of her platelet count  We did then decrease the dose and put on hold 2016  Patient had 2 phlebotomies while she was off the Hydrea  She is currently on Hydrea 500 mg once a day 2 days on and 1 day off and tolerating this dose well         Polycythemia vera ([facility] )   2/3/2016 Initial Diagnosis    PV (polycythemia vera) (HCC)         Interval history:    ROS: Review of Systems    Past Medical History:   Diagnosis Date    CAD (coronary artery disease)        Past Surgical History:   Procedure Laterality Date    BREAST BIOPSY      CAROTID ENDARTARECTOMY Left     CHOLECYSTECTOMY       and     HYSTERECTOMY  1981    KNEE CARTILAGE SURGERY Right 2001    OOPHORECTOMY  2010    REPLACEMENT TOTAL KNEE  2001       Social History     Socioeconomic History    Marital status: /Civil Union     Spouse name: None    Number of children: None    Years of education: None    Highest education level: None   Occupational History    None   Social Needs    Financial resource strain: None    Food insecurity     Worry: None     Inability: None    Transportation needs     Medical: None     Non-medical: None   Tobacco Use    Smoking status: Former Smoker     Types: Cigarettes     Quit date:      Years since quittin 1    Smokeless tobacco: Never Used    Tobacco comment: no passive smoke exposure   Substance and Sexual Activity    Alcohol use: No    Drug use: No    Sexual activity: None Lifestyle    Physical activity     Days per week: None     Minutes per session: None    Stress: None   Relationships    Social connections     Talks on phone: None     Gets together: None     Attends Hindu service: None     Active member of club or organization: None     Attends meetings of clubs or organizations: None     Relationship status: None    Intimate partner violence     Fear of current or ex partner: None     Emotionally abused: None     Physically abused: None     Forced sexual activity: None   Other Topics Concern    None   Social History Narrative    None       Family History   Problem Relation Age of Onset    Heart disease Mother     Hypertension Mother     Diabetes type II Mother     Cancer Father     Cystic fibrosis Daughter     Breast cancer Maternal Aunt 48    Breast cancer Cousin 30       Allergies   Allergen Reactions    Oxycodone Diarrhea, Dizziness, GI Intolerance, Headache, Irritability and Tremor     Other reaction(s):  Anxiety, Nausea and/or vomiting, Other (See Comments)  Dizziness      No Active Allergies          Current Outpatient Medications:     aspirin 81 mg chewable tablet, Chew 81 mg daily, Disp: , Rfl:     cholecalciferol (VITAMIN D3) 1,000 units tablet, Take 1,000 Units by mouth daily, Disp: , Rfl:     clotrimazole-betamethasone (LOTRISONE) 1-0 05 % cream, Apply topically 2 (two) times a day, Disp: 30 g, Rfl: 0    cyanocobalamin (VITAMIN B-12) 100 mcg tablet, Take 1,000 mcg by mouth, Disp: , Rfl:     dextran 70-hypromellose (Artificial Tears) 0 1-0 3 % ophthalmic solution, Apply topically, Disp: , Rfl:     erythromycin with ethanol (THERAMYCIN) 2 % external solution, Apply topically daily, Disp: 60 mL, Rfl: 0    fexofenadine (ALLEGRA) 180 MG tablet, Take by mouth, Disp: , Rfl:     hydroxyurea (HYDREA) 500 mg capsule, TAKE 2 DAYS ON AND 1 DAY OFF, Disp: 180 capsule, Rfl: 1    hydrOXYzine HCL (ATARAX) 25 mg tablet, every 24 hours, Disp: , Rfl:    losartan (COZAAR) 50 mg tablet, TAKE 1 TABLET DAILY, Disp: 90 tablet, Rfl: 1    metoprolol tartrate (LOPRESSOR) 50 mg tablet, TAKE 1/2 TABLET BY MOUTH EVERY 12 HOURS, Disp: 90 tablet, Rfl: 1    metroNIDAZOLE (METROCREAM) 0 75 % cream, Apply topically 2 (two) times a day, Disp: 45 g, Rfl: 0    mometasone (ELOCON) 0 1 % lotion, Apply topically daily, Disp: 60 mL, Rfl: 0    nystatin (MYCOSTATIN) powder, Apply topically 3 (three) times a day as needed (rash), Disp: 15 g, Rfl: 0    omeprazole (PriLOSEC) 20 mg delayed release capsule, TAKE 1 CAPSULE BY MOUTH EVERY DAY, Disp: 90 capsule, Rfl: 1    rosuvastatin (CRESTOR) 5 mg tablet, Take 1 tablet (5 mg total) by mouth daily, Disp: 90 tablet, Rfl: 1    sertraline (ZOLOFT) 50 mg tablet, TAKE 1 TABLET BY MOUTH EVERY DAY, Disp: 90 tablet, Rfl: 1    valACYclovir (VALTREX) 1,000 mg tablet, Take 1,000 mg by mouth 3 (three) times a day, Disp: , Rfl: 0      Physical Exam:  LMP  (LMP Unknown)     Physical Exam      Labs:  Lab Results   Component Value Date    WBC 5 69 02/17/2021    HGB 14 7 02/17/2021    HCT 46 4 (H) 02/17/2021     (H) 02/17/2021     02/17/2021     Lab Results   Component Value Date    K 4 4 02/17/2021     (H) 02/17/2021    CO2 29 02/17/2021    BUN 24 02/17/2021    CREATININE 1 40 (H) 02/17/2021    GLUF 96 02/17/2021    CALCIUM 10 1 02/17/2021    AST 17 02/10/2021    ALT 20 02/10/2021    ALKPHOS 86 02/10/2021    EGFR 36 02/17/2021     Lab Results   Component Value Date    TSH 1 64 04/26/2018       Patient voiced understanding and agreement in the above discussion  Aware to contact our office with questions/symptoms in the interim

## 2021-02-21 DIAGNOSIS — D45 PV (POLYCYTHEMIA VERA) (HCC): ICD-10-CM

## 2021-02-22 RX ORDER — HYDROXYUREA 500 MG/1
CAPSULE ORAL
Qty: 180 CAPSULE | Refills: 3 | Status: SHIPPED | OUTPATIENT
Start: 2021-02-22

## 2021-03-03 ENCOUNTER — TELEPHONE (OUTPATIENT)
Dept: FAMILY MEDICINE CLINIC | Facility: CLINIC | Age: 81
End: 2021-03-03

## 2021-03-03 NOTE — TELEPHONE ENCOUNTER
Pt left a message asking what else can be done for her back pain/leg pain from sciatica  She is scheduled for her covid vaccine on Friday and does not feel she can stand that long      Asking to talk to a nurse

## 2021-03-03 NOTE — TELEPHONE ENCOUNTER
Pt shed appt  To see Dr Alisa Edmonds  tomorrow for back pain into leg x 4 days  No relief with Tylenol, Aleve, pain patch

## 2021-03-04 ENCOUNTER — OFFICE VISIT (OUTPATIENT)
Dept: FAMILY MEDICINE CLINIC | Facility: CLINIC | Age: 81
End: 2021-03-04
Payer: COMMERCIAL

## 2021-03-04 VITALS
DIASTOLIC BLOOD PRESSURE: 76 MMHG | TEMPERATURE: 97 F | RESPIRATION RATE: 16 BRPM | BODY MASS INDEX: 36.62 KG/M2 | HEART RATE: 55 BPM | OXYGEN SATURATION: 97 % | SYSTOLIC BLOOD PRESSURE: 138 MMHG | HEIGHT: 62 IN | WEIGHT: 199 LBS

## 2021-03-04 DIAGNOSIS — E11.3292 TYPE 2 DIABETES MELLITUS WITH LEFT EYE AFFECTED BY MILD NONPROLIFERATIVE RETINOPATHY WITHOUT MACULAR EDEMA, WITHOUT LONG-TERM CURRENT USE OF INSULIN (HCC): ICD-10-CM

## 2021-03-04 DIAGNOSIS — I20.9 ANGINA PECTORIS (HCC): ICD-10-CM

## 2021-03-04 DIAGNOSIS — F32.0 MAJOR DEPRESSIVE DISORDER, SINGLE EPISODE, MILD (HCC): ICD-10-CM

## 2021-03-04 DIAGNOSIS — M54.42 ACUTE LEFT-SIDED LOW BACK PAIN WITH LEFT-SIDED SCIATICA: Primary | ICD-10-CM

## 2021-03-04 DIAGNOSIS — E66.01 MORBID (SEVERE) OBESITY DUE TO EXCESS CALORIES (HCC): ICD-10-CM

## 2021-03-04 DIAGNOSIS — I77.9 PAOD (PERIPHERAL ARTERIAL OCCLUSIVE DISEASE) (HCC): ICD-10-CM

## 2021-03-04 DIAGNOSIS — D45 POLYCYTHEMIA VERA (HCC): ICD-10-CM

## 2021-03-04 PROCEDURE — 99214 OFFICE O/P EST MOD 30 MIN: CPT | Performed by: FAMILY MEDICINE

## 2021-03-04 RX ORDER — METHOCARBAMOL 500 MG/1
500 TABLET, FILM COATED ORAL 4 TIMES DAILY
Qty: 30 TABLET | Refills: 0 | Status: SHIPPED | OUTPATIENT
Start: 2021-03-04 | End: 2021-11-18 | Stop reason: ALTCHOICE

## 2021-03-04 RX ORDER — PREDNISONE 50 MG/1
50 TABLET ORAL DAILY
Qty: 5 TABLET | Refills: 0 | Status: SHIPPED | OUTPATIENT
Start: 2021-03-04 | End: 2021-03-09

## 2021-03-04 NOTE — ASSESSMENT & PLAN NOTE
Stable  Continue same  Will continue to monitor    Lab Results   Component Value Date    HGBA1C 5 3 12/30/2020

## 2021-03-04 NOTE — PROGRESS NOTES
Assessment/Plan:  Type 2 diabetes mellitus with left eye affected by mild nonproliferative retinopathy without macular edema, without long-term current use of insulin (HCC)    Stable  Continue same  Will continue to monitor  Lab Results   Component Value Date    HGBA1C 5 3 12/30/2020       Angina pectoris (HCC)    Stable  We will continue to monitor  PAOD (peripheral arterial occlusive disease) (HCC)    Stable  Will continue to monitor  Polycythemia vera ([address] )    We will continue to monitor  Continue to follow-up with hematologist     Major depressive disorder, single episode, mild (HCC)    Stable  Without medications  Will continue to monitor  Acute left-sided low back pain with left-sided sciatica    Was given prescriptions for prednisone and Robaxin  Discussed about stretching exercises  Going to check x-ray  If symptoms are not improving I will consider referral to physical therapy  Diagnoses and all orders for this visit:    Acute left-sided low back pain with left-sided sciatica  -     predniSONE 50 mg tablet; Take 1 tablet (50 mg total) by mouth daily for 5 days  -     methocarbamol (ROBAXIN) 500 mg tablet; Take 1 tablet (500 mg total) by mouth 4 (four) times a day    Major depressive disorder, single episode, mild (HCC)    Type 2 diabetes mellitus with left eye affected by mild nonproliferative retinopathy without macular edema, without long-term current use of insulin (HCC)    PAOD (peripheral arterial occlusive disease) ([address] )    Morbid (severe) obesity due to excess calories ([address] )    Polycythemia vera ([address] )    Angina pectoris ([address] )          There are no Patient Instructions on file for this visit  No follow-ups on file  Subjective:      Patient ID: Morenita Smith is a 68 y o  female  Chief Complaint   Patient presents with    Back Pain        She is here today with complaint of left lower back pain radiating to her left lower extremity    She has been moving her Fridge lately and she felt she pulled muscle in her back  Pain has been getting worse in the last couple days and has been radiating to her left lower extremity      The following portions of the patient's history were reviewed and updated as appropriate: allergies, current medications, past family history, past medical history, past social history, past surgical history and problem list     Review of Systems   Constitutional: Negative for chills and fever  HENT: Negative for trouble swallowing  Eyes: Negative for visual disturbance  Respiratory: Negative for cough and shortness of breath  Cardiovascular: Negative for chest pain, palpitations and leg swelling  Gastrointestinal: Negative for abdominal pain, constipation and diarrhea  Endocrine: Negative for cold intolerance and heat intolerance  Genitourinary: Negative for difficulty urinating and dysuria  Musculoskeletal: Positive for back pain  Negative for gait problem  Skin: Negative for rash  Neurological: Negative for dizziness, tremors, seizures and headaches  Hematological: Negative for adenopathy  Psychiatric/Behavioral: Negative for behavioral problems           Current Outpatient Medications   Medication Sig Dispense Refill    aspirin 81 mg chewable tablet Chew 81 mg daily      cholecalciferol (VITAMIN D3) 1,000 units tablet Take 1,000 Units by mouth daily      clotrimazole-betamethasone (LOTRISONE) 1-0 05 % cream Apply topically 2 (two) times a day 30 g 0    cyanocobalamin (VITAMIN B-12) 100 mcg tablet Take 1,000 mcg by mouth      dextran 70-hypromellose (Artificial Tears) 0 1-0 3 % ophthalmic solution Apply topically      erythromycin with ethanol (THERAMYCIN) 2 % external solution Apply topically daily 60 mL 0    fexofenadine (ALLEGRA) 180 MG tablet Take by mouth      hydroxyurea (HYDREA) 500 mg capsule TAKE 2 DAYS ON AND 1 DAY  capsule 3    hydrOXYzine HCL (ATARAX) 25 mg tablet every 24 hours      losartan (COZAAR) 50 mg tablet TAKE 1 TABLET DAILY 90 tablet 1    metoprolol tartrate (LOPRESSOR) 50 mg tablet TAKE 1/2 TABLET BY MOUTH EVERY 12 HOURS 90 tablet 1    metroNIDAZOLE (METROCREAM) 0 75 % cream Apply topically 2 (two) times a day 45 g 0    mometasone (ELOCON) 0 1 % lotion Apply topically daily 60 mL 0    nystatin (MYCOSTATIN) powder Apply topically 3 (three) times a day as needed (rash) 15 g 0    omeprazole (PriLOSEC) 20 mg delayed release capsule TAKE 1 CAPSULE BY MOUTH EVERY DAY 90 capsule 1    rosuvastatin (CRESTOR) 5 mg tablet Take 1 tablet (5 mg total) by mouth daily 90 tablet 1    sertraline (ZOLOFT) 50 mg tablet TAKE 1 TABLET BY MOUTH EVERY DAY 90 tablet 1    valACYclovir (VALTREX) 1,000 mg tablet Take 1,000 mg by mouth 3 (three) times a day  0    methocarbamol (ROBAXIN) 500 mg tablet Take 1 tablet (500 mg total) by mouth 4 (four) times a day 30 tablet 0    predniSONE 50 mg tablet Take 1 tablet (50 mg total) by mouth daily for 5 days 5 tablet 0     No current facility-administered medications for this visit  Objective:    /76 (BP Location: Left arm, Patient Position: Sitting, Cuff Size: Adult)   Pulse 55   Temp (!) 97 °F (36 1 °C) (Tympanic)   Resp 16   Ht 5' 2" (1 575 m)   Wt 90 3 kg (199 lb)   LMP  (LMP Unknown)   SpO2 97%   BMI 36 40 kg/m²        Physical Exam  Vitals signs and nursing note reviewed  Constitutional:       Appearance: She is well-developed  HENT:      Head: Normocephalic and atraumatic  Eyes:      Pupils: Pupils are equal, round, and reactive to light  Neck:      Musculoskeletal: Normal range of motion and neck supple  Cardiovascular:      Rate and Rhythm: Normal rate and regular rhythm  Heart sounds: Normal heart sounds  Pulmonary:      Effort: Pulmonary effort is normal       Breath sounds: Normal breath sounds  Abdominal:      General: Bowel sounds are normal       Palpations: Abdomen is soft  Musculoskeletal: Normal range of motion  General: Tenderness (  Tenderness to palpation over the lumbar spine and the left paraspinal area  Straight leg raising test was positive on the left ) present  Lymphadenopathy:      Cervical: No cervical adenopathy  Skin:     General: Skin is warm  Neurological:      Mental Status: She is alert and oriented to person, place, and time  Cranial Nerves: No cranial nerve deficit                  Heena Caba MD

## 2021-03-04 NOTE — ASSESSMENT & PLAN NOTE
Was given prescriptions for prednisone and Robaxin  Discussed about stretching exercises  Going to check x-ray  If symptoms are not improving I will consider referral to physical therapy

## 2021-03-08 ENCOUNTER — TELEPHONE (OUTPATIENT)
Dept: HEMATOLOGY ONCOLOGY | Facility: CLINIC | Age: 81
End: 2021-03-08

## 2021-03-08 NOTE — TELEPHONE ENCOUNTER
I phoned the patient and left a voicemail message that, per Dr Merry Gamez, she is to double her dose of Vitamin D and take her Vitamin B-12 every other day  The [employer] number was provided

## 2021-03-12 LAB
LEFT EYE DIABETIC RETINOPATHY: NORMAL
RIGHT EYE DIABETIC RETINOPATHY: NORMAL
SEVERITY (EYE EXAM): NORMAL

## 2021-03-16 ENCOUNTER — HOSPITAL ENCOUNTER (OUTPATIENT)
Dept: RADIOLOGY | Facility: IMAGING CENTER | Age: 81
Discharge: HOME | End: 2021-03-16
Payer: COMMERCIAL

## 2021-03-16 ENCOUNTER — TELEPHONE (OUTPATIENT)
Dept: FAMILY MEDICINE CLINIC | Facility: CLINIC | Age: 81
End: 2021-03-16

## 2021-03-16 DIAGNOSIS — M54.42 ACUTE LEFT-SIDED LOW BACK PAIN WITH LEFT-SIDED SCIATICA: Primary | ICD-10-CM

## 2021-03-16 DIAGNOSIS — M54.42 ACUTE LEFT-SIDED LOW BACK PAIN WITH LEFT-SIDED SCIATICA: ICD-10-CM

## 2021-03-16 PROCEDURE — 73502 X-RAY EXAM HIP UNI 2-3 VIEWS: CPT

## 2021-03-24 ENCOUNTER — RA CDI HCC (OUTPATIENT)
Dept: OTHER | Facility: HOSPITAL | Age: 81
End: 2021-03-24

## 2021-03-24 NOTE — PROGRESS NOTES
Assessment & Plan:     K21 9  Gastroesophageal reflux disease  I have evaluated the patient and found the condition to be: Stable  I have evaluated the patient and: Recommended continue with same treatment plan  The patient should continue treatment and follow-up with:   Me    N18 30 Stage 3 chronic kidney disease, unspecified whether stage 3a or 3b CKD   I have evaluated the patient and found the condition to be: Stable  I have evaluated the patient and: Recommended continue with same treatment plan  The patient should continue treatment and follow-up with:  nephrologist    I77 9  Peripheral arterial occlusive disease) ([facility] )   I have evaluated the patient and found the condition to be: Stable  I have evaluated the patient and: Recommended continue with same treatment plan  The patient should continue treatment and follow-up with:  vascular surgeon    E11 51  Type 2 diabetes mellitus with diabetic peripheral angiopathy without gangrene  I have evaluated the patient and found the condition to be: Stable  I have evaluated the patient and: Recommended continue with same treatment plan  The patient should continue treatment and follow-up with:  me    E11 3292  Type 2 diabetes mellitus with left eye affected by mild nonproliferative retinopathy without macular edema, without long-term current use of insulin ([facility] )   I have evaluated the patient and found the condition to be: Stable  I have evaluated the patient and: Recommended continue with same treatment plan  The patient should continue treatment and follow-up with:  ophthalmologist    I25 119  Atherosclerotic heart disease of native coronary artery with unspecified angina pectoris  I have evaluated the patient and found the condition to be:  Stable  I have evaluated the patient and: Recommended continue with same treatment plan  The patient should continue treatment and follow-up with:  cardiologist    L40 9  Psoriasis  I have evaluated the patient and found the condition to be: Stable  I have evaluated the patient and: Recommended continue with same treatment plan  The patient should continue treatment and follow-up with:  dermatologist    D51 8  Other vitamin B12 deficiency anemias   I have evaluated the patient and found the condition to be: Stable  I have evaluated the patient and: Recommended continue with same treatment plan  The patient should continue treatment and follow-up with:  hematologist    E55 9  Vitamin D deficiency  I have evaluated the patient and found the condition to be: Stable  I have evaluated the patient and: Recommended continue with same treatment plan  The patient should continue treatment and follow-up with:  me    E66 9  Obesity  I have evaluated the patient and found the condition to be: Stable  I have evaluated the patient and: Recommended continue with same treatment plan  The patient should continue treatment and follow-up with:  me    F32 0  Current mild episode of major depressive disorder, unspecified whether recurrent ([facility] )   I have evaluated the patient and found the condition to be: Stable  I have evaluated the patient and: Recommended continue with same treatment plan  The patient should continue treatment and follow-up with:  me    M54 42  Acute left-sided low back pain with left-sided sciatica   I have evaluated the patient and found the condition to be: Resolved    I10  Essential hypertension   I have evaluated the patient and found the condition to be: Stable  I have evaluated the patient and: Recommended continue with same treatment plan  The patient should continue treatment and follow-up with:  cardiologist    BMI Counseling: Body mass index is 36 24 kg/m²  The BMI is above normal  Nutrition recommendations include decreasing portion sizes, encouraging healthy choices of fruits and vegetables and decreasing fast food intake             Subjective:     Patient ID: Alpa Robison is a 68 y o  female     Chief Complaint Patient presents with    Enhanced Visit    Hypertension    Hyperlipidemia    GERD      HPI    Review of Systems   Constitutional: Negative for chills and fever  HENT: Negative for trouble swallowing  Eyes: Negative for visual disturbance  Respiratory: Negative for cough and shortness of breath  Cardiovascular: Negative for chest pain, palpitations and leg swelling  Gastrointestinal: Negative for abdominal pain, constipation and diarrhea  Endocrine: Negative for cold intolerance and heat intolerance  Genitourinary: Negative for difficulty urinating and dysuria  Musculoskeletal: Negative for gait problem  Skin: Negative for rash  Neurological: Negative for dizziness, tremors, seizures and headaches  Hematological: Negative for adenopathy  Psychiatric/Behavioral: Negative for behavioral problems  Objective:      /76 (BP Location: Left arm, Patient Position: Sitting, Cuff Size: Adult)   Pulse 67   Temp 97 7 °F (36 5 °C) (Tympanic)   Resp 16   Ht 5' 2" (1 575 m)   Wt 89 9 kg (198 lb 2 oz)   LMP  (LMP Unknown)   SpO2 97%   BMI 36 24 kg/m²     Problem List Items Addressed This Visit        Endocrine    Diabetes mellitus without complication ([facility] ) - Primary       Stable  Continue same  Will continue to monitor  Lab Results   Component Value Date    HGBA1C 5 3 12/30/2020            Relevant Orders    CBC and differential    Comprehensive metabolic panel    Lipid Panel with Direct LDL reflex    Hemoglobin A1C    Microalbumin / creatinine urine ratio    TSH, 3rd generation with Free T4 reflex    Type 2 diabetes mellitus with left eye affected by mild nonproliferative retinopathy without macular edema, without long-term current use of insulin ([facility])       A1c has been well controlled    Continue to follow up with ophthalmologist   Lab Results   Component Value Date    HGBA1C 5 3 12/30/2020               Cardiovascular and Mediastinum    Arteriosclerosis of coronary artery Stable  Asymptomatic  Continue same  Continue to follow up with  Cardiologist          Essential hypertension       Well controlled  Continue same  Will continue to monitor  Musculoskeletal and Integument    Blister of scalp with infection       She was given prescription for doxycycline  Was discussed about possible side effects  Continue to follow-up with dermatology  Relevant Medications    doxycycline hyclate (VIBRA-TABS) 100 mg tablet       Other    Hyperlipidemia       Not well controlled  It was discussed about low-fat diet  Will continue to monitor fasting lipid profile  Physical Exam  Vitals signs and nursing note reviewed  Constitutional:       General: She is not in acute distress  Appearance: Normal appearance  She is well-developed  HENT:      Head: Normocephalic and atraumatic  Mouth/Throat:      Pharynx: Oropharynx is clear  Eyes:      Conjunctiva/sclera: Conjunctivae normal    Neck:      Musculoskeletal: Neck supple  Cardiovascular:      Rate and Rhythm: Normal rate and regular rhythm  Heart sounds: No murmur  Pulmonary:      Effort: Pulmonary effort is normal  No respiratory distress  Breath sounds: Normal breath sounds  Abdominal:      Palpations: Abdomen is soft  Tenderness: There is no abdominal tenderness  Musculoskeletal: Normal range of motion  Skin:     General: Skin is warm and dry  Findings: Lesion present  Neurological:      Mental Status: She is alert  Mental status is at baseline     Psychiatric:         Mood and Affect: Mood normal

## 2021-03-24 NOTE — PROGRESS NOTES
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

## 2021-03-26 ENCOUNTER — TELEPHONE (OUTPATIENT)
Dept: FAMILY MEDICINE CLINIC | Facility: CLINIC | Age: 81
End: 2021-03-26

## 2021-03-26 PROBLEM — E11.22 TYPE 2 DIABETES MELLITUS WITH CHRONIC KIDNEY DISEASE (HCC): Status: ACTIVE | Noted: 2021-03-26

## 2021-03-26 PROBLEM — I12.9 HYPERTENSIVE KIDNEY DISEASE WITH CHRONIC KIDNEY DISEASE, STAGE 1-4 OR UNSPECIFIED CHRONIC KIDNEY DISEASE: Status: ACTIVE | Noted: 2019-06-11

## 2021-03-26 NOTE — TELEPHONE ENCOUNTER
----- Message from Teresa Davis, [address] Gianna Whitten sent at 3/26/2021  8:48 AM EDT -----  X-ray showed mild arthritis in left hip but no other abnormalities  If she is still experience pain we can place referral to physical therapy

## 2021-03-30 ENCOUNTER — OFFICE VISIT (OUTPATIENT)
Dept: FAMILY MEDICINE CLINIC | Facility: CLINIC | Age: 81
End: 2021-03-30
Payer: COMMERCIAL

## 2021-03-30 VITALS
DIASTOLIC BLOOD PRESSURE: 76 MMHG | HEART RATE: 67 BPM | TEMPERATURE: 97.7 F | HEIGHT: 62 IN | SYSTOLIC BLOOD PRESSURE: 130 MMHG | RESPIRATION RATE: 16 BRPM | BODY MASS INDEX: 36.46 KG/M2 | OXYGEN SATURATION: 97 % | WEIGHT: 198.13 LBS

## 2021-03-30 DIAGNOSIS — I25.10 ARTERIOSCLEROSIS OF CORONARY ARTERY: ICD-10-CM

## 2021-03-30 DIAGNOSIS — L08.9: ICD-10-CM

## 2021-03-30 DIAGNOSIS — E11.9 DIABETES MELLITUS WITHOUT COMPLICATION (HCC): Primary | ICD-10-CM

## 2021-03-30 DIAGNOSIS — I10 ESSENTIAL HYPERTENSION: ICD-10-CM

## 2021-03-30 DIAGNOSIS — E11.3292 TYPE 2 DIABETES MELLITUS WITH LEFT EYE AFFECTED BY MILD NONPROLIFERATIVE RETINOPATHY WITHOUT MACULAR EDEMA, WITHOUT LONG-TERM CURRENT USE OF INSULIN (HCC): ICD-10-CM

## 2021-03-30 DIAGNOSIS — S00.02XS: ICD-10-CM

## 2021-03-30 DIAGNOSIS — E78.49 OTHER HYPERLIPIDEMIA: ICD-10-CM

## 2021-03-30 PROBLEM — S00.02XA: Status: ACTIVE | Noted: 2021-03-30

## 2021-03-30 PROBLEM — N18.32 STAGE 3B CHRONIC KIDNEY DISEASE (HCC): Status: ACTIVE | Noted: 2020-02-18

## 2021-03-30 PROCEDURE — 1160F RVW MEDS BY RX/DR IN RCRD: CPT | Performed by: FAMILY MEDICINE

## 2021-03-30 PROCEDURE — 1036F TOBACCO NON-USER: CPT | Performed by: FAMILY MEDICINE

## 2021-03-30 PROCEDURE — T1015 CLINIC SERVICE: HCPCS | Performed by: FAMILY MEDICINE

## 2021-03-30 PROCEDURE — 3078F DIAST BP <80 MM HG: CPT | Performed by: FAMILY MEDICINE

## 2021-03-30 PROCEDURE — 3075F SYST BP GE 130 - 139MM HG: CPT | Performed by: FAMILY MEDICINE

## 2021-03-30 PROCEDURE — 99214 OFFICE O/P EST MOD 30 MIN: CPT | Performed by: FAMILY MEDICINE

## 2021-03-30 RX ORDER — DOXYCYCLINE HYCLATE 100 MG
100 TABLET ORAL 2 TIMES DAILY
Qty: 28 TABLET | Refills: 0 | Status: SHIPPED | OUTPATIENT
Start: 2021-03-30 | End: 2021-04-13

## 2021-03-30 RX ORDER — FAMOTIDINE 20 MG/1
20 TABLET, FILM COATED ORAL 2 TIMES DAILY
COMMUNITY
Start: 2021-03-04 | End: 2021-11-18 | Stop reason: ALTCHOICE

## 2021-03-30 NOTE — ASSESSMENT & PLAN NOTE
Not well controlled  It was discussed about low-fat diet  Will continue to monitor fasting lipid profile

## 2021-03-30 NOTE — ASSESSMENT & PLAN NOTE
A1c has been well controlled    Continue to follow up with ophthalmologist   Lab Results   Component Value Date    HGBA1C 5 3 12/30/2020

## 2021-03-30 NOTE — PROGRESS NOTES
Assessment/Plan:  Diabetes mellitus without complication (HCC)    Stable  Continue same  Will continue to monitor  Lab Results   Component Value Date    HGBA1C 5 3 12/30/2020       Type 2 diabetes mellitus with left eye affected by mild nonproliferative retinopathy without macular edema, without long-term current use of insulin (HCC)    A1c has been well controlled  Continue to follow up with ophthalmologist   Lab Results   Component Value Date    HGBA1C 5 3 12/30/2020       Hyperlipidemia    Not well controlled  It was discussed about low-fat diet  Will continue to monitor fasting lipid profile  Blister of scalp with infection    She was given prescription for doxycycline  Was discussed about possible side effects  Continue to follow-up with dermatology  Essential hypertension    Well controlled  Continue same  Will continue to monitor  Arteriosclerosis of coronary artery    Stable  Asymptomatic  Continue same  Continue to follow up with  Cardiologist        Diagnoses and all orders for this visit:    Diabetes mellitus without complication ([facility] [address] )  -     CBC and differential; Future  -     Comprehensive metabolic panel; Future  -     Lipid Panel with Direct LDL reflex; Future  -     Hemoglobin A1C; Future  -     Microalbumin / creatinine urine ratio  -     TSH, 3rd generation with Free T4 reflex; Future    Blister of scalp with infection, sequela  -     doxycycline hyclate (VIBRA-TABS) 100 mg tablet; Take 1 tablet (100 mg total) by mouth 2 (two) times a day for 14 days    Type 2 diabetes mellitus with left eye affected by mild nonproliferative retinopathy without macular edema, without long-term current use of insulin (HCC)    Other hyperlipidemia    Essential hypertension    Arteriosclerosis of coronary artery    Other orders  -     famotidine (PEPCID) 20 mg tablet; Take 20 mg by mouth 2 (two) times a day          There are no Patient Instructions on file for this visit      Return in about 3 months (around 6/30/2021)  Subjective:      Patient ID: El Ramos is a 68 y o  female  Chief Complaint   Patient presents with    Enhanced Visit    Hypertension    Hyperlipidemia    GERD        Here today for follow-up multiple medical problems  He has been taking her medications  Denies any side effects from her medications  She had blood work done in December showed high cholesterol  Her GFR was low but stable  She follow-up with nephrologist   She continues to complain of skin lesion on her scalp  She follow-up with dermatologist and she is on creams but they do not seem to help  The following portions of the patient's history were reviewed and updated as appropriate: allergies, current medications, past family history, past medical history, past social history, past surgical history and problem list     Review of Systems   Constitutional: Negative for chills and fever  HENT: Negative for trouble swallowing  Eyes: Negative for visual disturbance  Respiratory: Negative for cough and shortness of breath  Cardiovascular: Negative for chest pain, palpitations and leg swelling  Gastrointestinal: Negative for abdominal pain, constipation and diarrhea  Endocrine: Negative for cold intolerance and heat intolerance  Genitourinary: Negative for difficulty urinating and dysuria  Musculoskeletal: Negative for gait problem  Skin:        Skin lesions on scalp   Neurological: Negative for dizziness, tremors, seizures and headaches  Hematological: Negative for adenopathy  Psychiatric/Behavioral: Negative for behavioral problems           Current Outpatient Medications   Medication Sig Dispense Refill    aspirin 81 mg chewable tablet Chew 81 mg daily      cholecalciferol (VITAMIN D3) 1,000 units tablet Take 1,000 Units by mouth daily      clotrimazole-betamethasone (LOTRISONE) 1-0 05 % cream Apply topically 2 (two) times a day 30 g 0    cyanocobalamin (VITAMIN B-12) 100 mcg tablet Take 1,000 mcg by mouth      famotidine (PEPCID) 20 mg tablet Take 20 mg by mouth 2 (two) times a day      fexofenadine (ALLEGRA) 180 MG tablet Take by mouth      hydroxyurea (HYDREA) 500 mg capsule TAKE 2 DAYS ON AND 1 DAY  capsule 3    hydrOXYzine HCL (ATARAX) 25 mg tablet every 24 hours      losartan (COZAAR) 50 mg tablet TAKE 1 TABLET DAILY 90 tablet 1    metoprolol tartrate (LOPRESSOR) 50 mg tablet TAKE 1/2 TABLET BY MOUTH EVERY 12 HOURS 90 tablet 1    metroNIDAZOLE (METROCREAM) 0 75 % cream Apply topically 2 (two) times a day 45 g 0    mometasone (ELOCON) 0 1 % lotion Apply topically daily 60 mL 0    nystatin (MYCOSTATIN) powder Apply topically 3 (three) times a day as needed (rash) 15 g 0    rosuvastatin (CRESTOR) 5 mg tablet Take 1 tablet (5 mg total) by mouth daily 90 tablet 1    sertraline (ZOLOFT) 50 mg tablet TAKE 1 TABLET BY MOUTH EVERY DAY 90 tablet 1    valACYclovir (VALTREX) 1,000 mg tablet Take 1,000 mg by mouth 3 (three) times a day  0    dextran 70-hypromellose (Artificial Tears) 0 1-0 3 % ophthalmic solution Apply topically      doxycycline hyclate (VIBRA-TABS) 100 mg tablet Take 1 tablet (100 mg total) by mouth 2 (two) times a day for 14 days 28 tablet 0    erythromycin with ethanol (THERAMYCIN) 2 % external solution Apply topically daily 60 mL 0    methocarbamol (ROBAXIN) 500 mg tablet Take 1 tablet (500 mg total) by mouth 4 (four) times a day (Patient not taking: Reported on 3/30/2021) 30 tablet 0    omeprazole (PriLOSEC) 20 mg delayed release capsule TAKE 1 CAPSULE BY MOUTH EVERY DAY (Patient not taking: Reported on 3/30/2021) 90 capsule 1     No current facility-administered medications for this visit          Objective:    /76 (BP Location: Left arm, Patient Position: Sitting, Cuff Size: Adult)   Pulse 67   Temp 97 7 °F (36 5 °C) (Tympanic)   Resp 16   Ht 5' 2" (1 575 m)   Wt 89 9 kg (198 lb 2 oz)   LMP  (LMP Unknown)   SpO2 97%   BMI 36 24 kg/m² Physical Exam  Vitals signs and nursing note reviewed  Constitutional:       Appearance: Normal appearance  She is well-developed  HENT:      Head: Normocephalic and atraumatic  Eyes:      Pupils: Pupils are equal, round, and reactive to light  Neck:      Musculoskeletal: Normal range of motion and neck supple  Cardiovascular:      Rate and Rhythm: Normal rate and regular rhythm  Heart sounds: Normal heart sounds  Pulmonary:      Effort: Pulmonary effort is normal       Breath sounds: Normal breath sounds  Abdominal:      General: Bowel sounds are normal       Palpations: Abdomen is soft  Musculoskeletal: Normal range of motion  Lymphadenopathy:      Cervical: No cervical adenopathy  Skin:     General: Skin is warm  Findings: Lesion (  On scalp) present  Neurological:      Mental Status: She is alert and oriented to person, place, and time  Cranial Nerves: No cranial nerve deficit  BMI Counseling: Body mass index is 36 24 kg/m²  The BMI is above normal  Nutrition recommendations include decreasing portion sizes, encouraging healthy choices of fruits and vegetables and decreasing fast food intake  Falls Plan of Care: balance, strength, and gait training instructions were provided              Adlee Olivares MD

## 2021-03-30 NOTE — ASSESSMENT & PLAN NOTE
She was given prescription for doxycycline  Was discussed about possible side effects  Continue to follow-up with dermatology

## 2021-05-03 ENCOUNTER — APPOINTMENT (OUTPATIENT)
Dept: LAB | Facility: IMAGING CENTER | Age: 81
End: 2021-05-03
Payer: COMMERCIAL

## 2021-05-03 DIAGNOSIS — D45 PV (POLYCYTHEMIA VERA) (HCC): ICD-10-CM

## 2021-05-03 LAB
ALBUMIN SERPL BCP-MCNC: 3.5 G/DL (ref 3.5–5)
ALP SERPL-CCNC: 78 U/L (ref 46–116)
ALT SERPL W P-5'-P-CCNC: 23 U/L (ref 12–78)
ANION GAP SERPL CALCULATED.3IONS-SCNC: 6 MMOL/L (ref 4–13)
AST SERPL W P-5'-P-CCNC: 16 U/L (ref 5–45)
BASOPHILS # BLD AUTO: 0.05 THOUSANDS/ΜL (ref 0–0.1)
BASOPHILS NFR BLD AUTO: 1 % (ref 0–1)
BILIRUB SERPL-MCNC: 0.38 MG/DL (ref 0.2–1)
BUN SERPL-MCNC: 21 MG/DL (ref 5–25)
CALCIUM SERPL-MCNC: 9.4 MG/DL (ref 8.3–10.1)
CHLORIDE SERPL-SCNC: 114 MMOL/L (ref 100–108)
CO2 SERPL-SCNC: 24 MMOL/L (ref 21–32)
CREAT SERPL-MCNC: 1.21 MG/DL (ref 0.6–1.3)
EOSINOPHIL # BLD AUTO: 0.18 THOUSAND/ΜL (ref 0–0.61)
EOSINOPHIL NFR BLD AUTO: 3 % (ref 0–6)
ERYTHROCYTE [DISTWIDTH] IN BLOOD BY AUTOMATED COUNT: 14.8 % (ref 11.6–15.1)
GFR SERPL CREATININE-BSD FRML MDRD: 43 ML/MIN/1.73SQ M
GLUCOSE P FAST SERPL-MCNC: 95 MG/DL (ref 65–99)
HCT VFR BLD AUTO: 44.4 % (ref 34.8–46.1)
HGB BLD-MCNC: 14.1 G/DL (ref 11.5–15.4)
IMM GRANULOCYTES # BLD AUTO: 0.01 THOUSAND/UL (ref 0–0.2)
IMM GRANULOCYTES NFR BLD AUTO: 0 % (ref 0–2)
LYMPHOCYTES # BLD AUTO: 1.26 THOUSANDS/ΜL (ref 0.6–4.47)
LYMPHOCYTES NFR BLD AUTO: 22 % (ref 14–44)
MCH RBC QN AUTO: 34.6 PG (ref 26.8–34.3)
MCHC RBC AUTO-ENTMCNC: 31.8 G/DL (ref 31.4–37.4)
MCV RBC AUTO: 109 FL (ref 82–98)
MONOCYTES # BLD AUTO: 0.27 THOUSAND/ΜL (ref 0.17–1.22)
MONOCYTES NFR BLD AUTO: 5 % (ref 4–12)
NEUTROPHILS # BLD AUTO: 3.94 THOUSANDS/ΜL (ref 1.85–7.62)
NEUTS SEG NFR BLD AUTO: 69 % (ref 43–75)
NRBC BLD AUTO-RTO: 0 /100 WBCS
PLATELET # BLD AUTO: 169 THOUSANDS/UL (ref 149–390)
PMV BLD AUTO: 10.4 FL (ref 8.9–12.7)
POTASSIUM SERPL-SCNC: 4.7 MMOL/L (ref 3.5–5.3)
PROT SERPL-MCNC: 6.9 G/DL (ref 6.4–8.2)
RBC # BLD AUTO: 4.08 MILLION/UL (ref 3.81–5.12)
SODIUM SERPL-SCNC: 144 MMOL/L (ref 136–145)
WBC # BLD AUTO: 5.71 THOUSAND/UL (ref 4.31–10.16)

## 2021-05-03 PROCEDURE — 80053 COMPREHEN METABOLIC PANEL: CPT

## 2021-05-03 PROCEDURE — 36415 COLL VENOUS BLD VENIPUNCTURE: CPT

## 2021-05-03 PROCEDURE — 85025 COMPLETE CBC W/AUTO DIFF WBC: CPT

## 2021-05-24 DIAGNOSIS — E78.49 OTHER HYPERLIPIDEMIA: ICD-10-CM

## 2021-05-24 RX ORDER — ROSUVASTATIN CALCIUM 5 MG/1
5 TABLET, COATED ORAL DAILY
Qty: 90 TABLET | Refills: 1 | Status: SHIPPED | OUTPATIENT
Start: 2021-05-24 | End: 2021-12-02

## 2021-06-14 ENCOUNTER — APPOINTMENT (OUTPATIENT)
Dept: LAB | Facility: IMAGING CENTER | Age: 81
End: 2021-06-14
Payer: COMMERCIAL

## 2021-06-14 DIAGNOSIS — E11.9 DIABETES MELLITUS WITHOUT COMPLICATION (HCC): ICD-10-CM

## 2021-06-14 LAB
ALBUMIN SERPL BCP-MCNC: 4 G/DL (ref 3.5–5)
ALP SERPL-CCNC: 91 U/L (ref 46–116)
ALT SERPL W P-5'-P-CCNC: 23 U/L (ref 12–78)
ANION GAP SERPL CALCULATED.3IONS-SCNC: 6 MMOL/L (ref 4–13)
AST SERPL W P-5'-P-CCNC: 19 U/L (ref 5–45)
BASOPHILS # BLD AUTO: 0.07 THOUSANDS/ΜL (ref 0–0.1)
BASOPHILS NFR BLD AUTO: 1 % (ref 0–1)
BILIRUB SERPL-MCNC: 0.52 MG/DL (ref 0.2–1)
BUN SERPL-MCNC: 30 MG/DL (ref 5–25)
CALCIUM SERPL-MCNC: 9.8 MG/DL (ref 8.3–10.1)
CHLORIDE SERPL-SCNC: 107 MMOL/L (ref 100–108)
CHOLEST SERPL-MCNC: 142 MG/DL (ref 50–200)
CO2 SERPL-SCNC: 29 MMOL/L (ref 21–32)
CREAT SERPL-MCNC: 1.19 MG/DL (ref 0.6–1.3)
EOSINOPHIL # BLD AUTO: 0.16 THOUSAND/ΜL (ref 0–0.61)
EOSINOPHIL NFR BLD AUTO: 2 % (ref 0–6)
ERYTHROCYTE [DISTWIDTH] IN BLOOD BY AUTOMATED COUNT: 14.9 % (ref 11.6–15.1)
EST. AVERAGE GLUCOSE BLD GHB EST-MCNC: 111 MG/DL
GFR SERPL CREATININE-BSD FRML MDRD: 44 ML/MIN/1.73SQ M
GLUCOSE P FAST SERPL-MCNC: 99 MG/DL (ref 65–99)
HBA1C MFR BLD: 5.5 %
HCT VFR BLD AUTO: 47.2 % (ref 34.8–46.1)
HDLC SERPL-MCNC: 50 MG/DL
HGB BLD-MCNC: 15.2 G/DL (ref 11.5–15.4)
IMM GRANULOCYTES # BLD AUTO: 0.02 THOUSAND/UL (ref 0–0.2)
IMM GRANULOCYTES NFR BLD AUTO: 0 % (ref 0–2)
LDLC SERPL CALC-MCNC: 58 MG/DL (ref 0–100)
LYMPHOCYTES # BLD AUTO: 1.47 THOUSANDS/ΜL (ref 0.6–4.47)
LYMPHOCYTES NFR BLD AUTO: 20 % (ref 14–44)
MCH RBC QN AUTO: 34.7 PG (ref 26.8–34.3)
MCHC RBC AUTO-ENTMCNC: 32.2 G/DL (ref 31.4–37.4)
MCV RBC AUTO: 108 FL (ref 82–98)
MONOCYTES # BLD AUTO: 0.39 THOUSAND/ΜL (ref 0.17–1.22)
MONOCYTES NFR BLD AUTO: 5 % (ref 4–12)
NEUTROPHILS # BLD AUTO: 5.14 THOUSANDS/ΜL (ref 1.85–7.62)
NEUTS SEG NFR BLD AUTO: 72 % (ref 43–75)
NRBC BLD AUTO-RTO: 0 /100 WBCS
PLATELET # BLD AUTO: 196 THOUSANDS/UL (ref 149–390)
PMV BLD AUTO: 10.8 FL (ref 8.9–12.7)
POTASSIUM SERPL-SCNC: 4.4 MMOL/L (ref 3.5–5.3)
PROT SERPL-MCNC: 7.2 G/DL (ref 6.4–8.2)
RBC # BLD AUTO: 4.38 MILLION/UL (ref 3.81–5.12)
SODIUM SERPL-SCNC: 142 MMOL/L (ref 136–145)
TRIGL SERPL-MCNC: 170 MG/DL
TSH SERPL DL<=0.05 MIU/L-ACNC: 1.8 UIU/ML (ref 0.36–3.74)
WBC # BLD AUTO: 7.25 THOUSAND/UL (ref 4.31–10.16)

## 2021-06-14 PROCEDURE — 80061 LIPID PANEL: CPT

## 2021-06-14 PROCEDURE — 36415 COLL VENOUS BLD VENIPUNCTURE: CPT

## 2021-06-14 PROCEDURE — 84443 ASSAY THYROID STIM HORMONE: CPT

## 2021-06-14 PROCEDURE — 80053 COMPREHEN METABOLIC PANEL: CPT

## 2021-06-14 PROCEDURE — 85025 COMPLETE CBC W/AUTO DIFF WBC: CPT

## 2021-06-14 PROCEDURE — 83036 HEMOGLOBIN GLYCOSYLATED A1C: CPT

## 2021-06-15 ENCOUNTER — APPOINTMENT (OUTPATIENT)
Dept: LAB | Facility: IMAGING CENTER | Age: 81
End: 2021-06-15
Payer: COMMERCIAL

## 2021-06-15 DIAGNOSIS — F32.0 CURRENT MILD EPISODE OF MAJOR DEPRESSIVE DISORDER, UNSPECIFIED WHETHER RECURRENT (HCC): ICD-10-CM

## 2021-06-15 DIAGNOSIS — I10 ESSENTIAL HYPERTENSION: ICD-10-CM

## 2021-06-15 LAB
CREAT UR-MCNC: 53.6 MG/DL
MICROALBUMIN UR-MCNC: <5 MG/L (ref 0–20)
MICROALBUMIN/CREAT 24H UR: <9 MG/G CREATININE (ref 0–30)

## 2021-06-15 PROCEDURE — 82043 UR ALBUMIN QUANTITATIVE: CPT | Performed by: FAMILY MEDICINE

## 2021-06-15 PROCEDURE — 82570 ASSAY OF URINE CREATININE: CPT | Performed by: FAMILY MEDICINE

## 2021-06-15 RX ORDER — LOSARTAN POTASSIUM 50 MG/1
TABLET ORAL
Qty: 90 TABLET | Refills: 1 | Status: SHIPPED | OUTPATIENT
Start: 2021-06-15 | End: 2021-07-01 | Stop reason: SDUPTHER

## 2021-06-15 RX ORDER — METOPROLOL TARTRATE 50 MG/1
TABLET, FILM COATED ORAL
Qty: 90 TABLET | Refills: 1 | Status: SHIPPED | OUTPATIENT
Start: 2021-06-15 | End: 2021-12-10

## 2021-06-21 ENCOUNTER — TELEPHONE (OUTPATIENT)
Dept: FAMILY MEDICINE CLINIC | Facility: CLINIC | Age: 81
End: 2021-06-21

## 2021-06-24 ENCOUNTER — RA CDI HCC (OUTPATIENT)
Dept: OTHER | Facility: HOSPITAL | Age: 81
End: 2021-06-24

## 2021-06-24 NOTE — PROGRESS NOTES
[place] [id number]  coding opportunities             Chart reviewed, (number of) suggestions sent to provider: 5     Problem listed updated  Provider Accepted, (number of) suggestions accepted: 5         Number of suggestions actually used: 2      Number of suggestions NOT actually used: 3     Patients insurance company: [employer] ([employer] and [employer])   dx not on bill: I70 203, I73 9, E11 51  Visit status: Patient arrived for their scheduled appointment        [place] [id number]  coding opportunities             Chart reviewed, (number of) suggestions sent to provider: 5   DX:  I70 203-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

## 2021-06-25 PROBLEM — I73.9 PERIPHERAL VASCULAR DISEASE, UNSPECIFIED (HCC): Status: ACTIVE | Noted: 2021-06-25

## 2021-06-25 PROBLEM — I70.203 UNSPECIFIED ATHEROSCLEROSIS OF NATIVE ARTERIES OF EXTREMITIES, BILATERAL LEGS (HCC): Status: ACTIVE | Noted: 2021-06-25

## 2021-07-01 ENCOUNTER — OFFICE VISIT (OUTPATIENT)
Dept: FAMILY MEDICINE CLINIC | Facility: CLINIC | Age: 81
End: 2021-07-01
Payer: COMMERCIAL

## 2021-07-01 VITALS
SYSTOLIC BLOOD PRESSURE: 170 MMHG | HEART RATE: 61 BPM | OXYGEN SATURATION: 97 % | BODY MASS INDEX: 36.25 KG/M2 | DIASTOLIC BLOOD PRESSURE: 68 MMHG | TEMPERATURE: 97.8 F | HEIGHT: 62 IN | RESPIRATION RATE: 16 BRPM | WEIGHT: 197 LBS

## 2021-07-01 DIAGNOSIS — N18.32 STAGE 3B CHRONIC KIDNEY DISEASE (HCC): ICD-10-CM

## 2021-07-01 DIAGNOSIS — N18.32 TYPE 2 DIABETES MELLITUS WITH STAGE 3B CHRONIC KIDNEY DISEASE, WITHOUT LONG-TERM CURRENT USE OF INSULIN (HCC): ICD-10-CM

## 2021-07-01 DIAGNOSIS — E78.49 OTHER HYPERLIPIDEMIA: ICD-10-CM

## 2021-07-01 DIAGNOSIS — E11.22 TYPE 2 DIABETES MELLITUS WITH STAGE 3B CHRONIC KIDNEY DISEASE, WITHOUT LONG-TERM CURRENT USE OF INSULIN (HCC): ICD-10-CM

## 2021-07-01 DIAGNOSIS — Z00.00 HEALTHCARE MAINTENANCE: ICD-10-CM

## 2021-07-01 DIAGNOSIS — I10 ESSENTIAL HYPERTENSION: ICD-10-CM

## 2021-07-01 DIAGNOSIS — E11.3292 TYPE 2 DIABETES MELLITUS WITH LEFT EYE AFFECTED BY MILD NONPROLIFERATIVE RETINOPATHY WITHOUT MACULAR EDEMA, WITHOUT LONG-TERM CURRENT USE OF INSULIN (HCC): Primary | ICD-10-CM

## 2021-07-01 PROCEDURE — G0439 PPPS, SUBSEQ VISIT: HCPCS | Performed by: FAMILY MEDICINE

## 2021-07-01 PROCEDURE — 1160F RVW MEDS BY RX/DR IN RCRD: CPT | Performed by: FAMILY MEDICINE

## 2021-07-01 PROCEDURE — 1125F AMNT PAIN NOTED PAIN PRSNT: CPT | Performed by: FAMILY MEDICINE

## 2021-07-01 PROCEDURE — 99214 OFFICE O/P EST MOD 30 MIN: CPT | Performed by: FAMILY MEDICINE

## 2021-07-01 PROCEDURE — 1036F TOBACCO NON-USER: CPT | Performed by: FAMILY MEDICINE

## 2021-07-01 PROCEDURE — 1170F FXNL STATUS ASSESSED: CPT | Performed by: FAMILY MEDICINE

## 2021-07-01 PROCEDURE — 3288F FALL RISK ASSESSMENT DOCD: CPT | Performed by: FAMILY MEDICINE

## 2021-07-01 PROCEDURE — 3078F DIAST BP <80 MM HG: CPT | Performed by: FAMILY MEDICINE

## 2021-07-01 PROCEDURE — 3077F SYST BP >= 140 MM HG: CPT | Performed by: FAMILY MEDICINE

## 2021-07-01 RX ORDER — LOSARTAN POTASSIUM 100 MG/1
100 TABLET ORAL DAILY
Qty: 90 TABLET | Refills: 1 | Status: SHIPPED | OUTPATIENT
Start: 2021-07-01 | End: 2022-02-14

## 2021-07-01 NOTE — ASSESSMENT & PLAN NOTE
Stable  Continue same  Will continue to monitor    Lab Results   Component Value Date    HGBA1C 5 5 06/14/2021

## 2021-07-01 NOTE — ASSESSMENT & PLAN NOTE
Stable  Continue same  Continue to monitor A1c in GFR    Lab Results   Component Value Date    HGBA1C 5 5 06/14/2021

## 2021-07-01 NOTE — PROGRESS NOTES
Assessment and Plan:     Problem List Items Addressed This Visit        Endocrine    Type 2 diabetes mellitus with left eye affected by mild nonproliferative retinopathy without macular edema, without long-term current use of insulin (HCC) - Primary       Stable  Continue same  Will continue to monitor  Lab Results   Component Value Date    HGBA1C 5 5 06/14/2021            Type 2 diabetes mellitus with chronic kidney disease (HCC)       Stable  Continue same  Continue to monitor A1c in GFR  Lab Results   Component Value Date    HGBA1C 5 5 06/14/2021               Cardiovascular and Mediastinum    Essential hypertension       Not well controlled  I am going to increase losartan to 100 mg daily  She is to monitor blood pressure at home and come back in 2 weeks  Relevant Medications    losartan (COZAAR) 100 MG tablet       Genitourinary    Stage 3b chronic kidney disease [facility])     Lab Results   Component Value Date    EGFR 44 06/14/2021    EGFR 43 05/03/2021    EGFR 36 02/17/2021    CREATININE 1 19 06/14/2021    CREATININE 1 21 05/03/2021    CREATININE 1 40 (H) 02/17/2021    stable  Continue to increase oral hydration  Will continue to monitor GFR  Other    Hyperlipidemia        Not well controlled but improving  Continue same  Will continue to monitor  Healthcare maintenance       It was discussed about immunizations, diet, exercise and safety measures  BMI Counseling: Body mass index is 36 03 kg/m²  The BMI is above normal  Nutrition recommendations include decreasing portion sizes, encouraging healthy choices of fruits and vegetables and decreasing fast food intake  Falls Plan of Care: balance, strength, and gait training instructions were provided  Preventive health issues were discussed with patient, and age appropriate screening tests were ordered as noted in patient's After Visit Summary    Personalized health advice and appropriate referrals for health education or preventive services given if needed, as noted in patient's After Visit Summary       History of Present Illness:     Patient presents for Medicare Annual Wellness visit    Patient Care Team:  Julianna Wilkins MD as PCP - General (Family Medicine)  Julianna Wilkins MD as PCP - PCP-[facility] Attributed-Roster     Problem List:     Patient Active Problem List   Diagnosis    [facility]cy[facility]mia vera [facility]    Other vitamin B12 deficiency anemias    Allergic rhinitis    Angina pectoris ([facility]    Anxiety    Arteriosclerosis of coronary artery    Benign neoplasm of colon    Carotid stenosis    Cholecystitis    Colon polyp    Depression    Gastroesophageal reflux disease    Herpes zoster    Hyperglycemia    Hyperlipidemia    Type 2 diabetes mellitus with diabetic peripheral angiopathy without gangrene ([address] )    Impaired fasting glucose    Osteoarthritis    Morbid obesity ([address]    Lymphedema    Posterior vitreous detachment    Renal failure    Retinal detachment    Vitamin D deficiency    Hypertensive kidney disease with chronic kidney disease, stage 1-4 or unspecified chronic kidney disease    Skin infection    Psoriasis    Healthcare maintenance    PAOD (peripheral arterial occlusive disease) ([facility]    Injury of right popliteal artery    Stage 3b chronic kidney disease ([address] )    Diabetes mellitus without complication (HCC)    Chronic pain of left ankle    Dermatitis    Major depressive disorder, single episode, mild (HCC)    Type 2 diabetes mellitus with left eye affected by mild nonproliferative retinopathy without macular edema, without long-term current use of insulin (HCC)    Acute left-sided low back pain with left-sided sciatica    Type 2 diabetes mellitus with chronic kidney disease ([address] )    Essential hypertension    Blister of scalp with infection    Unspecified atherosclerosis of native arteries of extremities, bilateral legs ([address] )    Peripheral vascular disease, unspecified [facility]      Past Medical and Surgical History:     Past Medical History:   Diagnosis Date    CAD (coronary artery disease)      Past Surgical History:   Procedure Laterality Date    BREAST BIOPSY      CAROTID ENDARTARECTOMY Left     CHOLECYSTECTOMY       and     HYSTERECTOMY  1981    KNEE CARTILAGE SURGERY Right 2001    OOPHORECTOMY  2010    REPLACEMENT TOTAL KNEE  2001      Family History:     Family History   Problem Relation Age of Onset    Heart disease Mother     Hypertension Mother     Diabetes type II Mother     Cancer Father     Cystic fibrosis Daughter     Breast cancer Maternal Aunt 48    Breast cancer Cousin 30      Social History:     Social History     Socioeconomic History    Marital status: /Civil Union     Spouse name: None    Number of children: None    Years of education: None    Highest education level: None   Occupational History    None   Tobacco Use    Smoking status: Former Smoker     Types: Cigarettes     Quit date:      Years since quittin 5    Smokeless tobacco: Never Used    Tobacco comment: no passive smoke exposure   Vaping Use    Vaping Use: Never used   Substance and Sexual Activity    Alcohol use: No    Drug use: No    Sexual activity: None   Other Topics Concern    None   Social History Narrative    None     Social Determinants of Health     Financial Resource Strain:     Difficulty of Paying Living Expenses:    Food Insecurity:     Worried About Running Out of Food in the Last Year:     Ran Out of Food in the Last Year:    Transportation Needs:     Lack of Transportation (Medical):      Lack of Transportation (Non-Medical):    Physical Activity:     Days of Exercise per Week:     Minutes of Exercise per Session:    Stress:     Feeling of Stress :    Social Connections:     Frequency of Communication with Friends and Family:     Frequency of Social Gatherings with Friends and Family:     Attends [employer] Services:     Active Member of Clubs or Organizations:     Attends Club or Organization Meetings:     Marital Status:    Intimate Partner Violence:     Fear of Current or Ex-Partner:     Emotionally Abused:     Physically Abused:     Sexually Abused:       Medications and Allergies:     Current Outpatient Medications   Medication Sig Dispense Refill    aspirin 81 mg chewable tablet Chew 81 mg daily      cholecalciferol (VITAMIN D3) 1,000 units tablet Take 1,000 Units by mouth daily      clotrimazole-betamethasone (LOTRISONE) 1-0 05 % cream Apply topically 2 (two) times a day 30 g 0    cyanocobalamin (VITAMIN B-12) 100 mcg tablet Take 1,000 mcg by mouth      dextran 70-hypromellose (Artificial Tears) 0 1-0 3 % ophthalmic solution Apply topically      econazole nitrate 1 % cream APPLY DAILY TO RASH FOR 1 WEEK   THEN AS NEEDED FOR FLARE      erythromycin with ethanol (THERAMYCIN) 2 % external solution Apply topically daily 60 mL 0    famotidine (PEPCID) 20 mg tablet Take 20 mg by mouth 2 (two) times a day      hydrocortisone 2 5 % cream       hydroxyurea (HYDREA) 500 mg capsule TAKE 2 DAYS ON AND 1 DAY  capsule 3    hydrOXYzine HCL (ATARAX) 25 mg tablet every 24 hours      losartan (COZAAR) 100 MG tablet Take 1 tablet (100 mg total) by mouth daily 90 tablet 1    metoprolol tartrate (LOPRESSOR) 50 mg tablet TAKE 1/2 TABLET BY MOUTH EVERY 12 HOURS 90 tablet 1    metroNIDAZOLE (METROCREAM) 0 75 % cream Apply topically 2 (two) times a day 45 g 0    mometasone (ELOCON) 0 1 % lotion Apply topically daily 60 mL 0    nystatin (MYCOSTATIN) powder Apply topically 3 (three) times a day as needed (rash) 15 g 0    rosuvastatin (CRESTOR) 5 mg tablet Take 1 tablet (5 mg total) by mouth daily 90 tablet 1    sertraline (ZOLOFT) 50 mg tablet TAKE 1 TABLET BY MOUTH EVERY DAY 90 tablet 1    valACYclovir (VALTREX) 1,000 mg tablet Take 1,000 mg by mouth 3 (three) times a day  0    fexofenadine (ALLEGRA) 180 MG tablet Take by mouth      methocarbamol (ROBAXIN) 500 mg tablet Take 1 tablet (500 mg total) by mouth 4 (four) times a day (Patient not taking: Reported on 3/30/2021) 30 tablet 0    omeprazole (PriLOSEC) 20 mg delayed release capsule TAKE 1 CAPSULE BY MOUTH EVERY DAY (Patient not taking: Reported on 3/30/2021) 90 capsule 1     No current facility-administered medications for this visit  Allergies   Allergen Reactions    Oxycodone Diarrhea, Dizziness, GI Intolerance, Headache, Irritability and Tremor     Other reaction(s): Anxiety, Nausea and/or vomiting, Other (See Comments)  Dizziness      No Active Allergies       Immunizations:     Immunization History   Administered Date(s) Administered    INFLUENZA 10/14/2015, 10/11/2016, 11/06/2017, 11/13/2018    Influenza Quadrivalent Preservative Free ID 11/20/2013, 10/09/2014, 10/14/2015, 11/09/2015, 10/23/2020    Influenza Split 12/06/2012, 11/20/2013, 10/09/2014    Influenza Split High Dose Preservative Free IM 10/14/2015, 11/09/2015, 10/11/2016, 11/06/2017, 11/13/2018, 11/02/2019, 10/23/2020    Influenza, high dose seasonal 0 7 mL 10/23/2020    Pneumococcal Conjugate 13-Valent 04/12/2016    Pneumococcal Polysaccharide PPV23 10/18/2002, 11/16/2007    SARS-CoV-2 / COVID-19 mRNA IM (Pfizer-BioNTech) 03/05/2021, 03/26/2021      Health Maintenance:         Topic Date Due    Hepatitis C Screening  Never done         Topic Date Due    DTaP,Tdap,and Td Vaccines (1 - Tdap) Never done    Influenza Vaccine (1) 09/01/2021      Medicare Health Risk Assessment:     /68 (BP Location: Left arm, Patient Position: Sitting, Cuff Size: Adult)   Pulse 61   Temp 97 8 °F (36 6 °C) (Tympanic)   Resp 16   Ht 5' 2" (1 575 m)   Wt 89 4 kg (197 lb)   LMP  (LMP Unknown)   SpO2 97%   BMI 36 03 kg/m²      Isiah Ramirez is here for her Subsequent Wellness visit  Health Risk Assessment:   Patient rates overall health as good   Patient feels that their physical health rating is same  Patient is satisfied with their life  Eyesight was rated as slightly worse  Hearing was rated as slightly worse  Patient feels that their emotional and mental health rating is same  Patients states they are sometimes angry  Patient states they are sometimes unusually tired/fatigued  Pain experienced in the last 7 days has been some  Patient's pain rating has been 4/10  Patient states that she has experienced no weight loss or gain in last 6 months  Fall Risk Screening: In the past year, patient has experienced: history of falling in past year      Urinary Incontinence Screening:   Patient has leaked urine accidently in the last six months  Home Safety:  Patient has trouble with stairs inside or outside of their home  Patient has working smoke alarms and has no working carbon monoxide detector  Home safety hazards include: none  Nutrition:   Current diet is Regular  Medications:   Patient is not currently taking any over-the-counter supplements  Patient is able to manage medications  Activities of Daily Living (ADLs)/Instrumental Activities of Daily Living (IADLs):   Walk and transfer into and out of bed and chair?: Yes  Dress and groom yourself?: Yes    Bathe or shower yourself?: Yes    Feed yourself? Yes  Do your laundry/housekeeping?: Yes  Manage your money, pay your bills and track your expenses?: Yes  Make your own meals?: Yes    Do your own shopping?: Yes    Previous Hospitalizations:   Any hospitalizations or ED visits within the last 12 months?: No      Advance Care Planning:   Living will: Yes    Durable POA for healthcare:  Yes    Advanced directive: Yes      Cognitive Screening:   Provider or family/friend/caregiver concerned regarding cognition?: No    PREVENTIVE SCREENINGS      Cardiovascular Screening:    General: Screening Not Indicated and History Lipid Disorder      Diabetes Screening:     General: Screening Not Indicated and History Diabetes Colorectal Cancer Screening:     General: Screening Not Indicated      Breast Cancer Screening:     General: Screening Not Indicated      Cervical Cancer Screening:    General: Screening Not Indicated      Osteoporosis Screening:    General: Risks and Benefits Discussed      Abdominal Aortic Aneurysm (AAA) Screening:        General: Screening Not Indicated      Lung Cancer Screening:     General: Screening Not Indicated      Hepatitis C Screening:    General: Risks and Benefits Discussed    Screening, Brief Intervention, and Referral to Treatment (SBIRT)    Screening  Typical number of drinks in a day: 0  Typical number of drinks in a week: 0  Interpretation: Low risk drinking behavior  Single Item Drug Screening:  How often have you used an illegal drug (including marijuana) or a prescription medication for non-medical reasons in the past year? never    Single Item Drug Screen Score: 0  Interpretation: Negative screen for possible drug use disorder    Brief Intervention  Alcohol & drug use screenings were reviewed  No concerns regarding substance use disorder identified         Clarissa Phelps MD

## 2021-07-01 NOTE — ASSESSMENT & PLAN NOTE
Lab Results   Component Value Date    EGFR 44 06/14/2021    EGFR 43 05/03/2021    EGFR 36 02/17/2021    CREATININE 1 19 06/14/2021    CREATININE 1 21 05/03/2021    CREATININE 1 40 (H) 02/17/2021    stable  Continue to increase oral hydration  Will continue to monitor GFR

## 2021-07-01 NOTE — ASSESSMENT & PLAN NOTE
Not well controlled  I am going to increase losartan to 100 mg daily  She is to monitor blood pressure at home and come back in 2 weeks

## 2021-07-01 NOTE — PROGRESS NOTES
Assessment/Plan:  Type 2 diabetes mellitus with left eye affected by mild nonproliferative retinopathy without macular edema, without long-term current use of insulin (HCC)    Stable  Continue same  Will continue to monitor  Lab Results   Component Value Date    HGBA1C 5 5 06/14/2021       Type 2 diabetes mellitus with chronic kidney disease (HCC)    Stable  Continue same  Continue to monitor A1c in GFR  Lab Results   Component Value Date    HGBA1C 5 5 06/14/2021       Essential hypertension    Not well controlled  I am going to increase losartan to 100 mg daily  She is to monitor blood pressure at home and come back in 2 weeks  Stage 3b chronic kidney disease [facility])  Lab Results   Component Value Date    EGFR 44 06/14/2021    EGFR 43 05/03/2021    EGFR 36 02/17/2021    CREATININE 1 19 06/14/2021    CREATININE 1 21 05/03/2021    CREATININE 1 40 (H) 02/17/2021    stable  Continue to increase oral hydration  Will continue to monitor GFR  Healthcare maintenance    It was discussed about immunizations, diet, exercise and safety measures  Hyperlipidemia     Not well controlled but improving  Continue same  Will continue to monitor  Diagnoses and all orders for this visit:    Type 2 diabetes mellitus with left eye affected by mild nonproliferative retinopathy without macular edema, without long-term current use of insulin (HCC)    Essential hypertension  Comments:  stable  Continue same  Will continue to monitor  Orders:  -     losartan (COZAAR) 100 MG tablet; Take 1 tablet (100 mg total) by mouth daily    Type 2 diabetes mellitus with stage 3b chronic kidney disease, without long-term current use of insulin (HCC)    Stage 3b chronic kidney disease ([address] )    Healthcare maintenance    Other hyperlipidemia    Other orders  -     hydrocortisone 2 5 % cream  -     econazole nitrate 1 % cream; APPLY DAILY TO RASH FOR 1 WEEK   THEN AS NEEDED FOR FLARE          There are no Patient Instructions on file for this visit  Return in about 15 days (around 7/16/2021)  Subjective:      Patient ID: Trenton James is a 68 y o  female  Chief Complaint   Patient presents with   [facility] [place] Wellness Visit        Here today for follow-up multiple medical problems  She has been taking her medications  Denies any side effects from her medications  Her blood pressure was elevated today  She complained of pain in the medial aspect of her right leg  Denies any recent history of injury or trauma  Had blood work done recently showed A1c well controlled  Her GFR is low but stable  The following portions of the patient's history were reviewed and updated as appropriate: allergies, current medications, past family history, past medical history, past social history, past surgical history and problem list     Review of Systems   Constitutional: Negative for chills and fever  HENT: Negative for trouble swallowing  Eyes: Negative for visual disturbance  Respiratory: Negative for cough and shortness of breath  Cardiovascular: Negative for chest pain, palpitations and leg swelling  Gastrointestinal: Negative for abdominal pain, constipation and diarrhea  Endocrine: Negative for cold intolerance and heat intolerance  Genitourinary: Negative for difficulty urinating and dysuria  Musculoskeletal: Negative for gait problem  Right leg pain   Skin: Negative for rash  Neurological: Negative for dizziness, tremors, seizures and headaches  Hematological: Negative for adenopathy  Psychiatric/Behavioral: Negative for behavioral problems           Current Outpatient Medications   Medication Sig Dispense Refill    aspirin 81 mg chewable tablet Chew 81 mg daily      cholecalciferol (VITAMIN D3) 1,000 units tablet Take 1,000 Units by mouth daily      clotrimazole-betamethasone (LOTRISONE) 1-0 05 % cream Apply topically 2 (two) times a day 30 g 0    cyanocobalamin (VITAMIN B-12) 100 mcg tablet Take 1,000 mcg by mouth      dextran 70-hypromellose (Artificial Tears) 0 1-0 3 % ophthalmic solution Apply topically      econazole nitrate 1 % cream APPLY DAILY TO RASH FOR 1 WEEK  THEN AS NEEDED FOR FLARE      erythromycin with ethanol (THERAMYCIN) 2 % external solution Apply topically daily 60 mL 0    famotidine (PEPCID) 20 mg tablet Take 20 mg by mouth 2 (two) times a day      hydrocortisone 2 5 % cream       hydroxyurea (HYDREA) 500 mg capsule TAKE 2 DAYS ON AND 1 DAY  capsule 3    hydrOXYzine HCL (ATARAX) 25 mg tablet every 24 hours      losartan (COZAAR) 100 MG tablet Take 1 tablet (100 mg total) by mouth daily 90 tablet 1    metoprolol tartrate (LOPRESSOR) 50 mg tablet TAKE 1/2 TABLET BY MOUTH EVERY 12 HOURS 90 tablet 1    metroNIDAZOLE (METROCREAM) 0 75 % cream Apply topically 2 (two) times a day 45 g 0    mometasone (ELOCON) 0 1 % lotion Apply topically daily 60 mL 0    nystatin (MYCOSTATIN) powder Apply topically 3 (three) times a day as needed (rash) 15 g 0    rosuvastatin (CRESTOR) 5 mg tablet Take 1 tablet (5 mg total) by mouth daily 90 tablet 1    sertraline (ZOLOFT) 50 mg tablet TAKE 1 TABLET BY MOUTH EVERY DAY 90 tablet 1    valACYclovir (VALTREX) 1,000 mg tablet Take 1,000 mg by mouth 3 (three) times a day  0    fexofenadine (ALLEGRA) 180 MG tablet Take by mouth      methocarbamol (ROBAXIN) 500 mg tablet Take 1 tablet (500 mg total) by mouth 4 (four) times a day (Patient not taking: Reported on 3/30/2021) 30 tablet 0    omeprazole (PriLOSEC) 20 mg delayed release capsule TAKE 1 CAPSULE BY MOUTH EVERY DAY (Patient not taking: Reported on 3/30/2021) 90 capsule 1     No current facility-administered medications for this visit         Objective:    /68 (BP Location: Left arm, Patient Position: Sitting, Cuff Size: Adult)   Pulse 61   Temp 97 8 °F (36 6 °C) (Tympanic)   Resp 16   Ht 5' 2" (1 575 m)   Wt 89 4 kg (197 lb)   LMP  (LMP Unknown)   SpO2 97%   BMI 36 03 kg/m² Physical Exam  Vitals and nursing note reviewed  Constitutional:       Appearance: She is well-developed  HENT:      Head: Normocephalic and atraumatic  Eyes:      Pupils: Pupils are equal, round, and reactive to light  Cardiovascular:      Rate and Rhythm: Normal rate and regular rhythm  Heart sounds: Normal heart sounds  Pulmonary:      Effort: Pulmonary effort is normal       Breath sounds: Normal breath sounds  Abdominal:      General: Bowel sounds are normal       Palpations: Abdomen is soft  Musculoskeletal:         General: Tenderness (  TendernessTo palpation medial aspect of the right thigh no erythema no edema ) present  Normal range of motion  Cervical back: Normal range of motion and neck supple  Lymphadenopathy:      Cervical: No cervical adenopathy  Skin:     General: Skin is warm  Neurological:      Mental Status: She is alert and oriented to person, place, and time  Cranial Nerves: No cranial nerve deficit  Neftali Osuna MD  Answers for HPI/ROS submitted by the patient on 6/29/2021  How would you rate your overall health?: good  Compared to last year, how is your physical health?: same  In general, how satisfied are you with your life?: satisfied  Compared to last year, how is your eyesight?: slightly worse  Compared to last year, how is your hearing?: slightly worse  Compared to last year, how is your emotional/mental health?: same  How often is anger a problem for you?: sometimes  How often do you feel unusually tired/fatigued?: sometimes  In the past 7 days, how much pain have you experienced?: some  If you answered "some" or "a lot", please rate the severity of your pain on a scale of 1 to 10 (1 being the least severe pain and 10 being the most intense pain)  : 4/10  In the past 6 months, have you lost or gained 10 pounds without trying?: No  One or more falls in the last year: Yes  In the past 6 months, have you accidentally leaked urine?: Yes  Do you have trouble with the stairs inside or outside your home?: Yes  Does your home have working smoke alarms?: Yes  Does your home have a carbon monoxide monitor?: No  Which safety hazards (if any) have you experienced in your home? Please select all that apply : none  How would you describe your current diet?  Please select all that apply : Regular  In addition to prescription medications, are you taking any over-the-counter supplements?: No  Can you manage your medications?: Yes  Are you currently taking any opioid medications?: No  Can you walk and transfer into and out of your bed and chair?: Yes  Can you dress and groom yourself?: Yes  Can you bathe or shower yourself?: Yes  Can you feed yourself?: Yes  Can you do your laundry/ housekeeping?: Yes  Can you manage your money, pay your bills, and track your expenses?: Yes  Can you make your own meals?: Yes  Can you do your own shopping?: Yes  Within the last 12 months, have you had any hospitalizations or Emergency Department visits?: No  Do you have a living will?: Yes  Do you have a Durable POA (Power of ) for healthcare decisions?: Yes  Do you have an Advanced Directive for end of life decisions?: Yes  How often have you used an illegal drug (including marijuana) or a prescription medication for non-medical reasons in the past year?: never  What is the typical number of drinks you consume in a day?: 0  What is the typical number of drinks you consume in a week?: 0  How often did you have a drink containing alcohol in the past year?: never  How many drinks did you have on a typical day  when you were drinking in the past year?: never  How often did you have 6 or more drinks on one occasion in the past year?: never

## 2021-07-08 ENCOUNTER — RA CDI HCC (OUTPATIENT)
Dept: OTHER | Facility: HOSPITAL | Age: 81
End: 2021-07-08

## 2021-07-08 NOTE — PROGRESS NOTES
[place] [id number]  coding opportunities             Chart reviewed, (number of) suggestions sent to provider: 3     Problem listed updated  Provider Accepted, (number of) suggestions accepted: 3               Patients insurance company: [employer] (Medicare Advantage and [employer])     Visit status: Patient canceled the appointment        [place] [id number]  coding opportunities             Chart reviewed, (number of) suggestions sent to provider: 3   DX:  I70 203-Unspecified atherosclerosis of native arteries of extremities, bilateral legs-podiatry notes  I73  9-Peripheral vascular disease, unspecified  E11 51-Type 2 diabetes mellitus with diabetic peripheral angiopathy without gangrene               Patients insurance company: [employer] (Medicare Advantage and [employer])

## 2021-07-20 ENCOUNTER — RA CDI HCC (OUTPATIENT)
Dept: OTHER | Facility: HOSPITAL | Age: 81
End: 2021-07-20

## 2021-07-20 NOTE — PROGRESS NOTES
Anirudh [facility] 75  coding opportunities          Chart reviewed, no opportunity found: CHART REVIEWED, NO OPPORTUNITY FOUND                     Patients insurance company: [employer] (Medicare Advantage and Commercial)

## 2021-07-21 ENCOUNTER — TRANSCRIBE ORDERS (OUTPATIENT)
Dept: VASCULAR SURGERY | Facility: CLINIC | Age: 81
End: 2021-07-21

## 2021-07-21 DIAGNOSIS — I73.9 PERIPHERAL VASCULAR DISEASE, UNSPECIFIED (HCC): Primary | ICD-10-CM

## 2021-07-27 ENCOUNTER — OFFICE VISIT (OUTPATIENT)
Dept: FAMILY MEDICINE CLINIC | Facility: CLINIC | Age: 81
End: 2021-07-27
Payer: COMMERCIAL

## 2021-07-27 VITALS
DIASTOLIC BLOOD PRESSURE: 64 MMHG | OXYGEN SATURATION: 98 % | SYSTOLIC BLOOD PRESSURE: 162 MMHG | RESPIRATION RATE: 16 BRPM | BODY MASS INDEX: 35.96 KG/M2 | TEMPERATURE: 97.7 F | HEIGHT: 62 IN | WEIGHT: 195.4 LBS | HEART RATE: 62 BPM

## 2021-07-27 DIAGNOSIS — I10 ESSENTIAL HYPERTENSION: Primary | ICD-10-CM

## 2021-07-27 PROCEDURE — 1160F RVW MEDS BY RX/DR IN RCRD: CPT | Performed by: FAMILY MEDICINE

## 2021-07-27 PROCEDURE — 1036F TOBACCO NON-USER: CPT | Performed by: FAMILY MEDICINE

## 2021-07-27 PROCEDURE — 99213 OFFICE O/P EST LOW 20 MIN: CPT | Performed by: FAMILY MEDICINE

## 2021-07-27 PROCEDURE — 3078F DIAST BP <80 MM HG: CPT | Performed by: FAMILY MEDICINE

## 2021-07-27 PROCEDURE — 3077F SYST BP >= 140 MM HG: CPT | Performed by: FAMILY MEDICINE

## 2021-07-27 RX ORDER — AMLODIPINE BESYLATE 2.5 MG/1
2.5 TABLET ORAL DAILY
Qty: 30 TABLET | Refills: 0 | Status: SHIPPED | OUTPATIENT
Start: 2021-07-27 | End: 2021-08-17 | Stop reason: SDUPTHER

## 2021-07-27 NOTE — PROGRESS NOTES
Assessment/Plan:    Essential hypertension  Not well controled  Continue losartan 100 mg and metoprolol 50 mg  Will start amlodipine 2 5 mg daily  Discussed the medication and side effects  Will continue to monitor  Problem List Items Addressed This Visit        Cardiovascular and Mediastinum    Essential hypertension - Primary     Not well controled  Continue losartan 100 mg and metoprolol 50 mg  Will start amlodipine 2 5 mg daily  Discussed the medication and side effects  Will continue to monitor  Relevant Medications    amLODIPine (NORVASC) 2 5 mg tablet            Subjective:      Patient ID: Andie Tabares is a 68 y o  female  Patient is a 68year-old female presenting for a 2 week follow-up for HTN  She was increased from 50 to 100 mg of losartan 2 weeks ago  She also takes metoprolol 50 mg daily  She continues to have home BP readings ranging from 130-160s/70-80s  She states she usually has been sitting for awhile before she takes her BP  BP in the office is elevated today  The following portions of the patient's history were reviewed and updated as appropriate: allergies, current medications, past family history, past medical history, past social history, past surgical history and problem list     Review of Systems   Constitutional: Negative for chills and fever  Eyes: Negative for visual disturbance  Respiratory: Negative for cough and shortness of breath  Cardiovascular: Negative for chest pain and palpitations  Gastrointestinal: Negative for abdominal pain and vomiting  Musculoskeletal: Negative for arthralgias and back pain  Skin: Negative for color change and rash  Neurological: Negative for seizures and syncope  All other systems reviewed and are negative          Objective:      /64 (BP Location: Left arm, Patient Position: Sitting, Cuff Size: Standard)   Pulse 62   Temp 97 7 °F (36 5 °C) (Tympanic)   Resp 16   Ht 5' 2" (1 575 m)   Wt 88 6 kg (195 lb 6 4 oz)   LMP  (LMP Unknown)   SpO2 98%   BMI 35 74 kg/m²          Physical Exam  Vitals and nursing note reviewed  Constitutional:       Appearance: Normal appearance  HENT:      Head: Normocephalic and atraumatic  Right Ear: External ear normal    Eyes:      Conjunctiva/sclera: Conjunctivae normal       Pupils: Pupils are equal, round, and reactive to light  Cardiovascular:      Rate and Rhythm: Normal rate and regular rhythm  Heart sounds: Normal heart sounds  Pulmonary:      Effort: Pulmonary effort is normal       Breath sounds: Normal breath sounds  Musculoskeletal:      Cervical back: Neck supple  Skin:     General: Skin is warm and dry  Neurological:      General: No focal deficit present  Mental Status: She is alert  Psychiatric:         Mood and Affect: Mood normal          Thought Content:  Thought content normal

## 2021-07-27 NOTE — ASSESSMENT & PLAN NOTE
Not well controled  Continue losartan 100 mg and metoprolol 50 mg  Will start amlodipine 2 5 mg daily  Discussed the medication and side effects  Will continue to monitor

## 2021-07-28 ENCOUNTER — APPOINTMENT (OUTPATIENT)
Dept: LAB | Facility: IMAGING CENTER | Age: 81
End: 2021-07-28
Payer: COMMERCIAL

## 2021-07-28 DIAGNOSIS — D51.8 OTHER VITAMIN B12 DEFICIENCY ANEMIAS: ICD-10-CM

## 2021-07-28 DIAGNOSIS — D45 PV (POLYCYTHEMIA VERA) (HCC): ICD-10-CM

## 2021-07-28 DIAGNOSIS — N28.9 RENAL DYSFUNCTION: ICD-10-CM

## 2021-07-28 LAB
ALBUMIN SERPL BCP-MCNC: 3.4 G/DL (ref 3.5–5)
ALP SERPL-CCNC: 79 U/L (ref 46–116)
ALT SERPL W P-5'-P-CCNC: 21 U/L (ref 12–78)
ANION GAP SERPL CALCULATED.3IONS-SCNC: 2 MMOL/L (ref 4–13)
AST SERPL W P-5'-P-CCNC: 19 U/L (ref 5–45)
BASOPHILS # BLD AUTO: 0.05 THOUSANDS/ΜL (ref 0–0.1)
BASOPHILS NFR BLD AUTO: 1 % (ref 0–1)
BILIRUB SERPL-MCNC: 0.48 MG/DL (ref 0.2–1)
BUN SERPL-MCNC: 19 MG/DL (ref 5–25)
CALCIUM ALBUM COR SERPL-MCNC: 9.8 MG/DL (ref 8.3–10.1)
CALCIUM SERPL-MCNC: 9.3 MG/DL (ref 8.3–10.1)
CHLORIDE SERPL-SCNC: 110 MMOL/L (ref 100–108)
CO2 SERPL-SCNC: 29 MMOL/L (ref 21–32)
CREAT SERPL-MCNC: 1.1 MG/DL (ref 0.6–1.3)
EOSINOPHIL # BLD AUTO: 0.14 THOUSAND/ΜL (ref 0–0.61)
EOSINOPHIL NFR BLD AUTO: 3 % (ref 0–6)
ERYTHROCYTE [DISTWIDTH] IN BLOOD BY AUTOMATED COUNT: 14.8 % (ref 11.6–15.1)
GFR SERPL CREATININE-BSD FRML MDRD: 48 ML/MIN/1.73SQ M
GLUCOSE P FAST SERPL-MCNC: 92 MG/DL (ref 65–99)
HCT VFR BLD AUTO: 45.3 % (ref 34.8–46.1)
HGB BLD-MCNC: 14.5 G/DL (ref 11.5–15.4)
IMM GRANULOCYTES # BLD AUTO: 0.02 THOUSAND/UL (ref 0–0.2)
IMM GRANULOCYTES NFR BLD AUTO: 0 % (ref 0–2)
LDH SERPL-CCNC: 225 U/L (ref 81–234)
LYMPHOCYTES # BLD AUTO: 1.22 THOUSANDS/ΜL (ref 0.6–4.47)
LYMPHOCYTES NFR BLD AUTO: 22 % (ref 14–44)
MCH RBC QN AUTO: 34.5 PG (ref 26.8–34.3)
MCHC RBC AUTO-ENTMCNC: 32 G/DL (ref 31.4–37.4)
MCV RBC AUTO: 108 FL (ref 82–98)
MONOCYTES # BLD AUTO: 0.27 THOUSAND/ΜL (ref 0.17–1.22)
MONOCYTES NFR BLD AUTO: 5 % (ref 4–12)
NEUTROPHILS # BLD AUTO: 3.86 THOUSANDS/ΜL (ref 1.85–7.62)
NEUTS SEG NFR BLD AUTO: 69 % (ref 43–75)
NRBC BLD AUTO-RTO: 0 /100 WBCS
PLATELET # BLD AUTO: 177 THOUSANDS/UL (ref 149–390)
PMV BLD AUTO: 10 FL (ref 8.9–12.7)
POTASSIUM SERPL-SCNC: 4.5 MMOL/L (ref 3.5–5.3)
PROT SERPL-MCNC: 7.3 G/DL (ref 6.4–8.2)
RBC # BLD AUTO: 4.2 MILLION/UL (ref 3.81–5.12)
SODIUM SERPL-SCNC: 141 MMOL/L (ref 136–145)
VIT B12 SERPL-MCNC: 1553 PG/ML (ref 100–900)
WBC # BLD AUTO: 5.56 THOUSAND/UL (ref 4.31–10.16)

## 2021-07-28 PROCEDURE — 80053 COMPREHEN METABOLIC PANEL: CPT

## 2021-07-28 PROCEDURE — 85025 COMPLETE CBC W/AUTO DIFF WBC: CPT

## 2021-07-28 PROCEDURE — 36415 COLL VENOUS BLD VENIPUNCTURE: CPT

## 2021-07-28 PROCEDURE — 82607 VITAMIN B-12: CPT

## 2021-07-28 PROCEDURE — 83615 LACTATE (LD) (LDH) ENZYME: CPT

## 2021-07-29 ENCOUNTER — TELEPHONE (OUTPATIENT)
Dept: ADMINISTRATIVE | Facility: OTHER | Age: 81
End: 2021-07-29

## 2021-07-29 NOTE — TELEPHONE ENCOUNTER
Upon review of the In Basket request we were able to locate, review, and update the patient chart as requested for Diabetic Eye Exam     Any additional questions or concerns should be emailed to the Practice Liaisons via [email or web address] email, please do not reply via In Basket      Thank you  Domonique Dejesus MA

## 2021-07-29 NOTE — TELEPHONE ENCOUNTER
----- Message from Mitra Jeffery sent at 7/27/2021  1:53 PM EDT -----  Regarding: dm eye exam  07/27/21 1:53 PM    Hello, our patient Billy Mcfarland has had DM eye exam completed/performed by Dr Willa Abdi  Please assist in updating the patient chart by locating document in epic The date of service is 3/12/21     Thank you,  Mitra Jeffery   [facility]

## 2021-08-10 ENCOUNTER — RA CDI HCC (OUTPATIENT)
Dept: OTHER | Facility: HOSPITAL | Age: 81
End: 2021-08-10

## 2021-08-10 NOTE — PROGRESS NOTES
[facility]  coding opportunities          Chart reviewed, no opportunity found: CHART REVIEWED, NO OPPORTUNITY FOUND                     Patients insurance company: [employer] (Medicare Advantage and Commercial)

## 2021-08-17 ENCOUNTER — OFFICE VISIT (OUTPATIENT)
Dept: FAMILY MEDICINE CLINIC | Facility: CLINIC | Age: 81
End: 2021-08-17
Payer: COMMERCIAL

## 2021-08-17 VITALS
HEART RATE: 55 BPM | TEMPERATURE: 97.6 F | BODY MASS INDEX: 35.88 KG/M2 | OXYGEN SATURATION: 97 % | HEIGHT: 62 IN | DIASTOLIC BLOOD PRESSURE: 80 MMHG | RESPIRATION RATE: 16 BRPM | SYSTOLIC BLOOD PRESSURE: 138 MMHG | WEIGHT: 195 LBS

## 2021-08-17 DIAGNOSIS — I10 ESSENTIAL HYPERTENSION: Primary | ICD-10-CM

## 2021-08-17 DIAGNOSIS — N18.32 STAGE 3B CHRONIC KIDNEY DISEASE (HCC): ICD-10-CM

## 2021-08-17 PROCEDURE — 99213 OFFICE O/P EST LOW 20 MIN: CPT | Performed by: FAMILY MEDICINE

## 2021-08-17 PROCEDURE — 3079F DIAST BP 80-89 MM HG: CPT | Performed by: FAMILY MEDICINE

## 2021-08-17 PROCEDURE — 3075F SYST BP GE 130 - 139MM HG: CPT | Performed by: FAMILY MEDICINE

## 2021-08-17 RX ORDER — AMLODIPINE BESYLATE 5 MG/1
5 TABLET ORAL DAILY
Qty: 30 TABLET | Refills: 3 | Status: SHIPPED | OUTPATIENT
Start: 2021-08-17 | End: 2021-11-09

## 2021-08-17 NOTE — PROGRESS NOTES
Assessment/Plan:  Stage 3b chronic kidney disease [facility])  Lab Results   Component Value Date    EGFR 48 07/28/2021    EGFR 44 06/14/2021    EGFR 43 05/03/2021    CREATININE 1 10 07/28/2021    CREATININE 1 19 06/14/2021    CREATININE 1 21 05/03/2021     Improving  Continue to encourage oral hydration  Continue to follow up with nephrologist     Essential hypertension   Improved  Continue same  Will continue to monitor  Diagnoses and all orders for this visit:    Essential hypertension  -     amLODIPine (NORVASC) 5 mg tablet; Take 1 tablet (5 mg total) by mouth daily    Stage 3b chronic kidney disease ([facility] [address] )          There are no Patient Instructions on file for this visit  Return if symptoms worsen or fail to improve  Subjective:      Patient ID: Wyatt Mcintyre is a 80 y o  female  Chief Complaint   Patient presents with    Hypertension     follow up        She is here today for follow-up for hypertension and CKD  Her blood pressure was elevated last visit  Norvasc 2 5 mg daily was added  Her blood pressure has been improving  She had blood work done showed GFR improved slightly  She has seen nephrologist       The following portions of the patient's history were reviewed and updated as appropriate: allergies, current medications, past family history, past medical history, past social history, past surgical history and problem list     Review of Systems   Constitutional: Negative for chills and fever  HENT: Negative for trouble swallowing  Eyes: Negative for visual disturbance  Respiratory: Negative for cough and shortness of breath  Cardiovascular: Negative for chest pain, palpitations and leg swelling  Gastrointestinal: Negative for abdominal pain, constipation and diarrhea  Endocrine: Negative for cold intolerance and heat intolerance  Genitourinary: Negative for difficulty urinating and dysuria  Musculoskeletal: Negative for gait problem  Skin: Negative for rash  Neurological: Negative for dizziness, tremors, seizures and headaches  Hematological: Negative for adenopathy  Psychiatric/Behavioral: Negative for behavioral problems  Current Outpatient Medications   Medication Sig Dispense Refill    amLODIPine (NORVASC) 5 mg tablet Take 1 tablet (5 mg total) by mouth daily 30 tablet 3    aspirin 81 mg chewable tablet Chew 81 mg daily      cholecalciferol (VITAMIN D3) 1,000 units tablet Take 1,000 Units by mouth daily      clotrimazole-betamethasone (LOTRISONE) 1-0 05 % cream Apply topically 2 (two) times a day 30 g 0    cyanocobalamin (VITAMIN B-12) 100 mcg tablet Take 1,000 mcg by mouth      econazole nitrate 1 % cream APPLY DAILY TO RASH FOR 1 WEEK   THEN AS NEEDED FOR FLARE      erythromycin with ethanol (THERAMYCIN) 2 % external solution Apply topically daily 60 mL 0    famotidine (PEPCID) 20 mg tablet Take 20 mg by mouth 2 (two) times a day      fexofenadine (ALLEGRA) 180 MG tablet Take by mouth      hydrocortisone 2 5 % cream       hydroxyurea (HYDREA) 500 mg capsule TAKE 2 DAYS ON AND 1 DAY  capsule 3    hydrOXYzine HCL (ATARAX) 25 mg tablet every 24 hours      losartan (COZAAR) 100 MG tablet Take 1 tablet (100 mg total) by mouth daily 90 tablet 1    metoprolol tartrate (LOPRESSOR) 50 mg tablet TAKE 1/2 TABLET BY MOUTH EVERY 12 HOURS 90 tablet 1    metroNIDAZOLE (METROCREAM) 0 75 % cream Apply topically 2 (two) times a day 45 g 0    mometasone (ELOCON) 0 1 % lotion Apply topically daily 60 mL 0    nystatin (MYCOSTATIN) powder Apply topically 3 (three) times a day as needed (rash) 15 g 0    rosuvastatin (CRESTOR) 5 mg tablet Take 1 tablet (5 mg total) by mouth daily 90 tablet 1    sertraline (ZOLOFT) 50 mg tablet TAKE 1 TABLET BY MOUTH EVERY DAY 90 tablet 1    valACYclovir (VALTREX) 1,000 mg tablet Take 1,000 mg by mouth 3 (three) times a day  0    dextran 70-hypromellose (Artificial Tears) 0 1-0 3 % ophthalmic solution Apply topically (Patient not taking: Reported on 8/17/2021)      methocarbamol (ROBAXIN) 500 mg tablet Take 1 tablet (500 mg total) by mouth 4 (four) times a day (Patient not taking: Reported on 3/30/2021) 30 tablet 0    omeprazole (PriLOSEC) 20 mg delayed release capsule TAKE 1 CAPSULE BY MOUTH EVERY DAY (Patient not taking: Reported on 3/30/2021) 90 capsule 1     No current facility-administered medications for this visit  Objective:    /80 (BP Location: Left arm, Patient Position: Sitting, Cuff Size: Adult)   Pulse 55   Temp 97 6 °F (36 4 °C) (Tympanic)   Resp 16   Ht 5' 2" (1 575 m)   Wt 88 5 kg (195 lb)   LMP  (LMP Unknown)   SpO2 97%   BMI 35 67 kg/m²        Physical Exam  Vitals and nursing note reviewed  Constitutional:       Appearance: Normal appearance  She is well-developed  HENT:      Head: Normocephalic and atraumatic  Eyes:      Pupils: Pupils are equal, round, and reactive to light  Cardiovascular:      Rate and Rhythm: Normal rate and regular rhythm  Heart sounds: Normal heart sounds  Pulmonary:      Effort: Pulmonary effort is normal       Breath sounds: Normal breath sounds  Abdominal:      General: Bowel sounds are normal       Palpations: Abdomen is soft  Musculoskeletal:         General: Normal range of motion  Cervical back: Normal range of motion and neck supple  Lymphadenopathy:      Cervical: No cervical adenopathy  Skin:     General: Skin is warm  Neurological:      Mental Status: She is alert and oriented to person, place, and time  Cranial Nerves: No cranial nerve deficit                  Malia Borwn MD

## 2021-08-19 NOTE — ASSESSMENT & PLAN NOTE
Lab Results   Component Value Date    EGFR 48 07/28/2021    EGFR 44 06/14/2021    EGFR 43 05/03/2021    CREATININE 1 10 07/28/2021    CREATININE 1 19 06/14/2021    CREATININE 1 21 05/03/2021     Improving  Continue to encourage oral hydration   Continue to follow up with nephrologist

## 2021-08-20 ENCOUNTER — OFFICE VISIT (OUTPATIENT)
Dept: HEMATOLOGY ONCOLOGY | Facility: CLINIC | Age: 81
End: 2021-08-20
Payer: COMMERCIAL

## 2021-08-20 VITALS
BODY MASS INDEX: 35.66 KG/M2 | TEMPERATURE: 98.1 F | DIASTOLIC BLOOD PRESSURE: 66 MMHG | OXYGEN SATURATION: 97 % | HEIGHT: 62 IN | SYSTOLIC BLOOD PRESSURE: 150 MMHG | HEART RATE: 61 BPM | WEIGHT: 193.8 LBS | RESPIRATION RATE: 18 BRPM

## 2021-08-20 DIAGNOSIS — D51.8 OTHER VITAMIN B12 DEFICIENCY ANEMIAS: ICD-10-CM

## 2021-08-20 DIAGNOSIS — D45 PV (POLYCYTHEMIA VERA) (HCC): Primary | ICD-10-CM

## 2021-08-20 PROCEDURE — 1160F RVW MEDS BY RX/DR IN RCRD: CPT | Performed by: NURSE PRACTITIONER

## 2021-08-20 PROCEDURE — 99214 OFFICE O/P EST MOD 30 MIN: CPT | Performed by: NURSE PRACTITIONER

## 2021-08-20 PROCEDURE — 1036F TOBACCO NON-USER: CPT | Performed by: NURSE PRACTITIONER

## 2021-08-20 NOTE — PROGRESS NOTES
Hematology/Oncology Outpatient Follow-up  Chandrika Plummer 80 y o  female 1940 [id number]    Date:  8/20/2021      Assessment and Plan:  1  PV (polycythemia vera) ([facility] [address] )  Patient is tolerating her current dose of hydroxyurea 500 mg 2 days on and 1 day off which seems to be maintaining her hematocrit levels around 45%  We will continue her on the current dose without any changes  She will continue to get her blood work done on an every three-month basis  Will follow up again in the office in about 6 months     - CBC and differential; Future  - Comprehensive metabolic panel; Future  - LD,Blood; Future  - C-reactive protein; Future  - Sedimentation rate, automated; Future  - CBC and differential; Standing  - CBC and differential    2  Other vitamin B12 deficiency anemias  Patient will continue her oral supplementation     - Vitamin B12; Future    HPI:  Patient presents today for a follow-up visit accompanied by her   She is doing well and has no new complaints  Continues to take her hydroxyurea 500 mg 2 days on with 1 day off  Her most recent laboratory studies from 07/28/2021 showed normal white cells and platelets, H&H 76 8/43 2,   CMP is appropriate  LDH normal 225  B12 on supplementation 1553  Oncology History Overview Note   Patient had extensive workup for further evaluation of her elevated H&H  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, which is diagnostic for polycythemia vera  She was started on Hydrea 500 mg once a day February 10, 2016  The dose was increased to 500 mg twice a day which resulted in significant drop of her platelet count  We did then decrease the dose and put on hold April 4, 2016  Patient had 2 phlebotomies while she was off the Hydrea  She is currently on Hydrea 500 mg once a day 2 days on and 1 day off and tolerating this dose well         Polycythemia vera ([facility] [address] )   2/3/2016 Initial Diagnosis    PV (polycythemia vera) ([facility] [address] ) Interval history:    ROS: Review of Systems   Constitutional: Negative for activity change, appetite change, chills, fatigue, fever and unexpected weight change  HENT: Positive for hearing loss  Negative for congestion, mouth sores, nosebleeds, sore throat and trouble swallowing  Eyes: Negative  Respiratory: Negative for cough, chest tightness and shortness of breath  Cardiovascular: Negative for chest pain, palpitations and leg swelling  Gastrointestinal: Negative for abdominal distention, abdominal pain, blood in stool, constipation, diarrhea, nausea and vomiting  Genitourinary: Negative for difficulty urinating, dysuria, frequency, hematuria and urgency  Musculoskeletal: Positive for arthralgias and myalgias  Negative for back pain, gait problem and joint swelling  Skin: Positive for rash  Negative for color change and pallor  Neurological: Positive for numbness  Negative for dizziness, weakness, light-headedness and headaches  Hematological: Negative for adenopathy  Does not bruise/bleed easily  Psychiatric/Behavioral: Positive for sleep disturbance  Negative for dysphoric mood  The patient is not nervous/anxious          Past Medical History:   Diagnosis Date    CAD (coronary artery disease)        Past Surgical History:   Procedure Laterality Date    BREAST BIOPSY      CAROTID ENDARTARECTOMY Left     CHOLECYSTECTOMY       and     HYSTERECTOMY  1981    KNEE CARTILAGE SURGERY Right 2001    OOPHORECTOMY  2010    REPLACEMENT TOTAL KNEE  2001       Social History     Socioeconomic History    Marital status: /Civil Union     Spouse name: None    Number of children: None    Years of education: None    Highest education level: None   Occupational History    None   Tobacco Use    Smoking status: Former Smoker     Types: Cigarettes     Quit date:      Years since quittin 6    Smokeless tobacco: Never Used    Tobacco comment: no passive smoke exposure Vaping Use    Vaping Use: Never used   Substance and Sexual Activity    Alcohol use: No    Drug use: No    Sexual activity: None   Other Topics Concern    None   Social History Narrative    None     Social Determinants of Health     Financial Resource Strain:     Difficulty of Paying Living Expenses:    Food Insecurity:     Worried About Running Out of Food in the Last Year:     [address] in the Last Year:    Transportation Needs:     Lack of Transportation (Medical):  Lack of Transportation (Non-Medical):    Physical Activity:     Days of Exercise per Week:     Minutes of Exercise per Session:    Stress:     Feeling of Stress :    Social Connections:     Frequency of Communication with Friends and Family:     Frequency of Social Gatherings with Friends and Family:     Attends Scientologist Services:     Active Member of Clubs or Organizations:     Attends Club or Organization Meetings:     Marital Status:    Intimate Partner Violence:     Fear of Current or Ex-Partner:     Emotionally Abused:     Physically Abused:     Sexually Abused:        Family History   Problem Relation Age of Onset    Heart disease Mother     Hypertension Mother     Diabetes type II Mother     Cancer Father     Cystic fibrosis Daughter     Breast cancer Maternal Aunt 48    Breast cancer Cousin 30       Allergies   Allergen Reactions    Oxycodone Diarrhea, Dizziness, GI Intolerance, Headache, Irritability and Tremor     Other reaction(s):  Anxiety, Nausea and/or vomiting, Other (See Comments)  Dizziness      No Active Allergies          Current Outpatient Medications:     amLODIPine (NORVASC) 5 mg tablet, Take 1 tablet (5 mg total) by mouth daily, Disp: 30 tablet, Rfl: 3    aspirin 81 mg chewable tablet, Chew 81 mg daily, Disp: , Rfl:     cholecalciferol (VITAMIN D3) 1,000 units tablet, Take 1,000 Units by mouth daily, Disp: , Rfl:     clotrimazole-betamethasone (LOTRISONE) 1-0 05 % cream, Apply topically 2 (two) times a day, Disp: 30 g, Rfl: 0    cyanocobalamin (VITAMIN B-12) 100 mcg tablet, Take 1,000 mcg by mouth, Disp: , Rfl:     dextran 70-hypromellose (Artificial Tears) 0 1-0 3 % ophthalmic solution, Apply topically , Disp: , Rfl:     econazole nitrate 1 % cream, APPLY DAILY TO RASH FOR 1 WEEK   THEN AS NEEDED FOR FLARE, Disp: , Rfl:     erythromycin with ethanol (THERAMYCIN) 2 % external solution, Apply topically daily, Disp: 60 mL, Rfl: 0    famotidine (PEPCID) 20 mg tablet, Take 20 mg by mouth 2 (two) times a day, Disp: , Rfl:     fexofenadine (ALLEGRA) 180 MG tablet, Take by mouth, Disp: , Rfl:     hydrocortisone 2 5 % cream, , Disp: , Rfl:     hydroxyurea (HYDREA) 500 mg capsule, TAKE 2 DAYS ON AND 1 DAY OFF, Disp: 180 capsule, Rfl: 3    hydrOXYzine HCL (ATARAX) 25 mg tablet, every 24 hours, Disp: , Rfl:     losartan (COZAAR) 100 MG tablet, Take 1 tablet (100 mg total) by mouth daily, Disp: 90 tablet, Rfl: 1    methocarbamol (ROBAXIN) 500 mg tablet, Take 1 tablet (500 mg total) by mouth 4 (four) times a day, Disp: 30 tablet, Rfl: 0    metoprolol tartrate (LOPRESSOR) 50 mg tablet, TAKE 1/2 TABLET BY MOUTH EVERY 12 HOURS, Disp: 90 tablet, Rfl: 1    metroNIDAZOLE (METROCREAM) 0 75 % cream, Apply topically 2 (two) times a day, Disp: 45 g, Rfl: 0    mometasone (ELOCON) 0 1 % lotion, Apply topically daily, Disp: 60 mL, Rfl: 0    nystatin (MYCOSTATIN) powder, Apply topically 3 (three) times a day as needed (rash), Disp: 15 g, Rfl: 0    rosuvastatin (CRESTOR) 5 mg tablet, Take 1 tablet (5 mg total) by mouth daily, Disp: 90 tablet, Rfl: 1    sertraline (ZOLOFT) 50 mg tablet, TAKE 1 TABLET BY MOUTH EVERY DAY, Disp: 90 tablet, Rfl: 1    valACYclovir (VALTREX) 1,000 mg tablet, Take 1,000 mg by mouth 3 (three) times a day, Disp: , Rfl: 0    omeprazole (PriLOSEC) 20 mg delayed release capsule, TAKE 1 CAPSULE BY MOUTH EVERY DAY (Patient not taking: Reported on 3/30/2021), Disp: 90 capsule, Rfl: 1      Physical Exam:  /66 (BP Location: Left arm, Patient Position: Sitting, Cuff Size: Large)   Pulse 61   Temp 98 1 °F (36 7 °C) (Temporal)   Resp 18   Ht 5' 2" (1 575 m)   Wt 87 9 kg (193 lb 12 8 oz)   LMP  (LMP Unknown)   SpO2 97%   BMI 35 45 kg/m²     Physical Exam  Vitals reviewed  Constitutional:       General: She is not in acute distress  Appearance: She is well-developed  She is not diaphoretic  HENT:      Head: Normocephalic and atraumatic  Eyes:      General: No scleral icterus  Conjunctiva/sclera: Conjunctivae normal       Pupils: Pupils are equal, round, and reactive to light  Neck:      Thyroid: No thyromegaly  Cardiovascular:      Rate and Rhythm: Normal rate and regular rhythm  Heart sounds: Normal heart sounds  No murmur heard  Pulmonary:      Effort: Pulmonary effort is normal  No respiratory distress  Breath sounds: Normal breath sounds  Abdominal:      General: There is no distension  Palpations: Abdomen is soft  There is no hepatomegaly or splenomegaly  Tenderness: There is no abdominal tenderness  Musculoskeletal:         General: Normal range of motion  Cervical back: Normal range of motion and neck supple  Right lower leg: Edema (trace) present  Left lower leg: Edema (trace) present  Lymphadenopathy:      Cervical: No cervical adenopathy  Upper Body:      Right upper body: No axillary adenopathy  Left upper body: No axillary adenopathy  Skin:     General: Skin is warm and dry  Findings: No erythema or rash  Neurological:      General: No focal deficit present  Mental Status: She is alert and oriented to person, place, and time  Psychiatric:         Mood and Affect: Mood and affect normal          Behavior: Behavior normal  Behavior is cooperative  Thought Content:  Thought content normal          Judgment: Judgment normal            Labs:  Lab Results   Component Value Date WBC 5 56 07/28/2021    HGB 14 5 07/28/2021    HCT 45 3 07/28/2021     (H) 07/28/2021     07/28/2021     Lab Results   Component Value Date    K 4 5 07/28/2021     (H) 07/28/2021    CO2 29 07/28/2021    BUN 19 07/28/2021    CREATININE 1 10 07/28/2021    GLUF 92 07/28/2021    CALCIUM 9 3 07/28/2021    CORRECTEDCA 9 8 07/28/2021    AST 19 07/28/2021    ALT 21 07/28/2021    ALKPHOS 79 07/28/2021    EGFR 48 07/28/2021       Patient voiced understanding and agreement in the above discussion  Aware to contact our office with questions/symptoms in the interim  This note has been generated by voice recognition software system  Therefore, there may be spelling, grammar, and or syntax errors  Please contact if questions arise

## 2021-09-13 ENCOUNTER — HOSPITAL ENCOUNTER (OUTPATIENT)
Dept: NON INVASIVE DIAGNOSTICS | Facility: CLINIC | Age: 81
Discharge: HOME | End: 2021-09-13
Payer: COMMERCIAL

## 2021-09-13 DIAGNOSIS — I73.9 PERIPHERAL VASCULAR DISEASE, UNSPECIFIED (HCC): ICD-10-CM

## 2021-09-13 DIAGNOSIS — I10 ESSENTIAL HYPERTENSION: ICD-10-CM

## 2021-09-13 PROCEDURE — 93925 LOWER EXTREMITY STUDY: CPT

## 2021-09-13 PROCEDURE — 93923 UPR/LXTR ART STDY 3+ LVLS: CPT

## 2021-09-14 PROCEDURE — 93922 UPR/L XTREMITY ART 2 LEVELS: CPT | Performed by: SURGERY

## 2021-09-14 PROCEDURE — 93925 LOWER EXTREMITY STUDY: CPT | Performed by: SURGERY

## 2021-09-14 RX ORDER — AMLODIPINE BESYLATE 2.5 MG/1
TABLET ORAL
Qty: 30 TABLET | Refills: 2 | Status: SHIPPED | OUTPATIENT
Start: 2021-09-14 | End: 2021-11-18 | Stop reason: ALTCHOICE

## 2021-09-20 ENCOUNTER — APPOINTMENT (OUTPATIENT)
Dept: LAB | Facility: IMAGING CENTER | Age: 81
End: 2021-09-20
Payer: COMMERCIAL

## 2021-09-20 DIAGNOSIS — N18.32 STAGE 3B CHRONIC KIDNEY DISEASE (HCC): ICD-10-CM

## 2021-09-20 LAB
ALBUMIN SERPL BCP-MCNC: 3.7 G/DL (ref 3.5–5)
ANION GAP SERPL CALCULATED.3IONS-SCNC: 4 MMOL/L (ref 4–13)
BUN SERPL-MCNC: 28 MG/DL (ref 5–25)
CALCIUM SERPL-MCNC: 9.3 MG/DL (ref 8.3–10.1)
CHLORIDE SERPL-SCNC: 110 MMOL/L (ref 100–108)
CO2 SERPL-SCNC: 26 MMOL/L (ref 21–32)
CREAT SERPL-MCNC: 1.3 MG/DL (ref 0.6–1.3)
ERYTHROCYTE [DISTWIDTH] IN BLOOD BY AUTOMATED COUNT: 15.1 % (ref 11.6–15.1)
GFR SERPL CREATININE-BSD FRML MDRD: 39 ML/MIN/1.73SQ M
GLUCOSE P FAST SERPL-MCNC: 108 MG/DL (ref 65–99)
HCT VFR BLD AUTO: 45.5 % (ref 34.8–46.1)
HGB BLD-MCNC: 14.9 G/DL (ref 11.5–15.4)
MCH RBC QN AUTO: 34.8 PG (ref 26.8–34.3)
MCHC RBC AUTO-ENTMCNC: 32.7 G/DL (ref 31.4–37.4)
MCV RBC AUTO: 106 FL (ref 82–98)
PHOSPHATE SERPL-MCNC: 3.2 MG/DL (ref 2.3–4.1)
PLATELET # BLD AUTO: 163 THOUSANDS/UL (ref 149–390)
PMV BLD AUTO: 10.2 FL (ref 8.9–12.7)
POTASSIUM SERPL-SCNC: 4.6 MMOL/L (ref 3.5–5.3)
PTH-INTACT SERPL-MCNC: 79.4 PG/ML (ref 18.4–80.1)
RBC # BLD AUTO: 4.28 MILLION/UL (ref 3.81–5.12)
SODIUM SERPL-SCNC: 140 MMOL/L (ref 136–145)
URATE SERPL-MCNC: 6.2 MG/DL (ref 2–6.8)
WBC # BLD AUTO: 5.05 THOUSAND/UL (ref 4.31–10.16)

## 2021-09-20 PROCEDURE — 85027 COMPLETE CBC AUTOMATED: CPT

## 2021-09-20 PROCEDURE — 80069 RENAL FUNCTION PANEL: CPT

## 2021-09-20 PROCEDURE — 83970 ASSAY OF PARATHORMONE: CPT

## 2021-09-20 PROCEDURE — 36415 COLL VENOUS BLD VENIPUNCTURE: CPT

## 2021-09-20 PROCEDURE — 84550 ASSAY OF BLOOD/URIC ACID: CPT

## 2021-11-09 DIAGNOSIS — I10 ESSENTIAL HYPERTENSION: ICD-10-CM

## 2021-11-09 RX ORDER — AMLODIPINE BESYLATE 5 MG/1
TABLET ORAL
Qty: 90 TABLET | Refills: 1 | Status: SHIPPED | OUTPATIENT
Start: 2021-11-09 | End: 2022-05-13

## 2021-11-11 ENCOUNTER — RA CDI HCC (OUTPATIENT)
Dept: OTHER | Facility: HOSPITAL | Age: 81
End: 2021-11-11

## 2021-11-11 ENCOUNTER — APPOINTMENT (OUTPATIENT)
Dept: LAB | Facility: IMAGING CENTER | Age: 81
End: 2021-11-11
Payer: COMMERCIAL

## 2021-11-11 LAB
BASOPHILS # BLD AUTO: 0.04 THOUSANDS/ΜL (ref 0–0.1)
BASOPHILS NFR BLD AUTO: 1 % (ref 0–1)
EOSINOPHIL # BLD AUTO: 0.11 THOUSAND/ΜL (ref 0–0.61)
EOSINOPHIL NFR BLD AUTO: 2 % (ref 0–6)
ERYTHROCYTE [DISTWIDTH] IN BLOOD BY AUTOMATED COUNT: 14.9 % (ref 11.6–15.1)
HCT VFR BLD AUTO: 40.4 % (ref 34.8–46.1)
HGB BLD-MCNC: 13 G/DL (ref 11.5–15.4)
IMM GRANULOCYTES # BLD AUTO: 0.02 THOUSAND/UL (ref 0–0.2)
IMM GRANULOCYTES NFR BLD AUTO: 0 % (ref 0–2)
LYMPHOCYTES # BLD AUTO: 0.87 THOUSANDS/ΜL (ref 0.6–4.47)
LYMPHOCYTES NFR BLD AUTO: 18 % (ref 14–44)
MCH RBC QN AUTO: 34.8 PG (ref 26.8–34.3)
MCHC RBC AUTO-ENTMCNC: 32.2 G/DL (ref 31.4–37.4)
MCV RBC AUTO: 108 FL (ref 82–98)
MONOCYTES # BLD AUTO: 0.27 THOUSAND/ΜL (ref 0.17–1.22)
MONOCYTES NFR BLD AUTO: 6 % (ref 4–12)
NEUTROPHILS # BLD AUTO: 3.49 THOUSANDS/ΜL (ref 1.85–7.62)
NEUTS SEG NFR BLD AUTO: 73 % (ref 43–75)
NRBC BLD AUTO-RTO: 0 /100 WBCS
PLATELET # BLD AUTO: 154 THOUSANDS/UL (ref 149–390)
PMV BLD AUTO: 10.5 FL (ref 8.9–12.7)
RBC # BLD AUTO: 3.74 MILLION/UL (ref 3.81–5.12)
WBC # BLD AUTO: 4.8 THOUSAND/UL (ref 4.31–10.16)

## 2021-11-11 PROCEDURE — 85025 COMPLETE CBC W/AUTO DIFF WBC: CPT | Performed by: NURSE PRACTITIONER

## 2021-11-11 PROCEDURE — 36415 COLL VENOUS BLD VENIPUNCTURE: CPT | Performed by: NURSE PRACTITIONER

## 2021-11-18 ENCOUNTER — TELEPHONE (OUTPATIENT)
Dept: FAMILY MEDICINE CLINIC | Facility: CLINIC | Age: 81
End: 2021-11-18

## 2021-11-18 ENCOUNTER — OFFICE VISIT (OUTPATIENT)
Dept: FAMILY MEDICINE CLINIC | Facility: CLINIC | Age: 81
End: 2021-11-18
Payer: COMMERCIAL

## 2021-11-18 VITALS
BODY MASS INDEX: 36.14 KG/M2 | HEART RATE: 61 BPM | HEIGHT: 62 IN | SYSTOLIC BLOOD PRESSURE: 130 MMHG | RESPIRATION RATE: 16 BRPM | OXYGEN SATURATION: 98 % | WEIGHT: 196.38 LBS | DIASTOLIC BLOOD PRESSURE: 64 MMHG | TEMPERATURE: 97.8 F

## 2021-11-18 DIAGNOSIS — E78.49 OTHER HYPERLIPIDEMIA: ICD-10-CM

## 2021-11-18 DIAGNOSIS — N18.32 TYPE 2 DIABETES MELLITUS WITH STAGE 3B CHRONIC KIDNEY DISEASE, WITHOUT LONG-TERM CURRENT USE OF INSULIN (HCC): ICD-10-CM

## 2021-11-18 DIAGNOSIS — Z23 ENCOUNTER FOR IMMUNIZATION: Primary | ICD-10-CM

## 2021-11-18 DIAGNOSIS — E11.3292 TYPE 2 DIABETES MELLITUS WITH LEFT EYE AFFECTED BY MILD NONPROLIFERATIVE RETINOPATHY WITHOUT MACULAR EDEMA, WITHOUT LONG-TERM CURRENT USE OF INSULIN (HCC): ICD-10-CM

## 2021-11-18 DIAGNOSIS — K21.9 GASTROESOPHAGEAL REFLUX DISEASE WITHOUT ESOPHAGITIS: ICD-10-CM

## 2021-11-18 DIAGNOSIS — F32.0 MAJOR DEPRESSIVE DISORDER, SINGLE EPISODE, MILD (HCC): ICD-10-CM

## 2021-11-18 DIAGNOSIS — E11.22 TYPE 2 DIABETES MELLITUS WITH STAGE 3B CHRONIC KIDNEY DISEASE, WITHOUT LONG-TERM CURRENT USE OF INSULIN (HCC): ICD-10-CM

## 2021-11-18 DIAGNOSIS — I10 ESSENTIAL HYPERTENSION: ICD-10-CM

## 2021-11-18 DIAGNOSIS — I12.9 HYPERTENSIVE KIDNEY DISEASE WITH CHRONIC KIDNEY DISEASE, STAGE 1-4 OR UNSPECIFIED CHRONIC KIDNEY DISEASE: ICD-10-CM

## 2021-11-18 PROCEDURE — 1036F TOBACCO NON-USER: CPT | Performed by: FAMILY MEDICINE

## 2021-11-18 PROCEDURE — G0008 ADMIN INFLUENZA VIRUS VAC: HCPCS

## 2021-11-18 PROCEDURE — 3078F DIAST BP <80 MM HG: CPT | Performed by: FAMILY MEDICINE

## 2021-11-18 PROCEDURE — 3075F SYST BP GE 130 - 139MM HG: CPT | Performed by: FAMILY MEDICINE

## 2021-11-18 PROCEDURE — 99214 OFFICE O/P EST MOD 30 MIN: CPT | Performed by: FAMILY MEDICINE

## 2021-11-18 PROCEDURE — 90662 IIV NO PRSV INCREASED AG IM: CPT

## 2021-11-18 PROCEDURE — 1160F RVW MEDS BY RX/DR IN RCRD: CPT | Performed by: FAMILY MEDICINE

## 2021-11-18 RX ORDER — KETOCONAZOLE 20 MG/G
CREAM TOPICAL
COMMUNITY
Start: 2021-10-04 | End: 2022-04-08

## 2021-11-18 RX ORDER — DOXAZOSIN MESYLATE 1 MG/1
TABLET ORAL
COMMUNITY
Start: 2021-10-01

## 2021-12-02 DIAGNOSIS — E78.49 OTHER HYPERLIPIDEMIA: ICD-10-CM

## 2021-12-02 RX ORDER — ROSUVASTATIN CALCIUM 5 MG/1
TABLET, COATED ORAL
Qty: 90 TABLET | Refills: 1 | Status: SHIPPED | OUTPATIENT
Start: 2021-12-02 | End: 2022-06-02

## 2021-12-10 DIAGNOSIS — I10 ESSENTIAL HYPERTENSION: ICD-10-CM

## 2021-12-10 DIAGNOSIS — F32.0 CURRENT MILD EPISODE OF MAJOR DEPRESSIVE DISORDER, UNSPECIFIED WHETHER RECURRENT (HCC): ICD-10-CM

## 2021-12-10 RX ORDER — METOPROLOL TARTRATE 50 MG/1
TABLET, FILM COATED ORAL
Qty: 90 TABLET | Refills: 1 | Status: SHIPPED | OUTPATIENT
Start: 2021-12-10 | End: 2022-06-10

## 2022-01-07 ENCOUNTER — RA CDI HCC (OUTPATIENT)
Dept: OTHER | Facility: HOSPITAL | Age: 82
End: 2022-01-07

## 2022-01-07 NOTE — PROGRESS NOTES
[address]  coding opportunities          Number of diagnosis code(s) already on the problem list added to FYI flag: 10     Chart Reviewed * (Number of) Inbasket suggestions sent to Provider: 2     Problem listed updated  Provider Accepted, (number of) suggestions accepted: 2               Patients insurance company: [employer] (Medicare Advantage and Commercial)           [facility] [address]  coding opportunities          Number of diagnosis code(s) already on the problem list added to [employer] flag: 10     Chart Reviewed * (Number of) Inbasket suggestions sent to Provider: 2                  Patients insurance company: [employer] (Medicare Advantage and Commercial)           1) Refer to eye exam dated 7/29/21  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 ([facility] [address] )  H25 13 Age-related nuclear cataract, bilateral     ---- Per ICD 10 coding guidelines, cataracts in diabetic patients should be coded as linked conditions     2)E11 51 Type 2 diabetes mellitus with diabetic peripheral angiopathy without gangrene ([address] )  -----According to the ICD 10 coding   [facility] [address]  coding opportunities          Number of diagnosis code(s) already on the problem list added to FYI flag: 10     Chart Reviewed * (Number of) Inbasket suggestions sent to Provider: 2     Problem listed updated  Provider Accepted, (number of) suggestions accepted: 2         Number of suggestions used: 0      Number of suggestions NOT actually used: 12     Patients insurance company: [employer] (Medicare Advantage and Commercial)     Visit status: Patient arrived for their scheduled appointment          [facility] [address]  coding opportunities          Number of diagnosis code(s) already on the problem list added to FYI flag: 10     Chart Reviewed * (Number of) Inbasket suggestions sent to Provider: 2     Problem listed updated   Provider Accepted, (number of) suggestions accepted: 2         Number of suggestions used: 0      Number of suggestions NOT actually used: 12     Patients insurance company: [employer] (Medicare Advantage and Commercial)     Visit status: Patient arrived for their scheduled appointment        guidelines, these above two conditions are presumed to have    a causal-effect relationship and thus are always coded together, unless the documentation states they are not related to each other    With the beginning of the new year, this is a reminder to address ALL HCC (risk adjustment) codes for 2022 as patient scores reset to zero SAMANTHA    (10)

## 2022-01-13 PROBLEM — H25.13 AGE-RELATED NUCLEAR CATARACT, BILATERAL: Status: ACTIVE | Noted: 2022-01-13

## 2022-01-13 PROBLEM — E11.51 TYPE 2 DIABETES MELLITUS WITH DIABETIC PERIPHERAL ANGIOPATHY WITHOUT GANGRENE (HCC): Status: ACTIVE | Noted: 2022-01-13

## 2022-01-13 PROBLEM — E11.36 TYPE 2 DIABETES MELLITUS WITH DIABETIC CATARACT (HCC): Status: ACTIVE | Noted: 2022-01-13

## 2022-01-14 ENCOUNTER — OFFICE VISIT (OUTPATIENT)
Dept: FAMILY MEDICINE CLINIC | Facility: CLINIC | Age: 82
End: 2022-01-14
Payer: COMMERCIAL

## 2022-01-14 VITALS
HEIGHT: 62 IN | RESPIRATION RATE: 16 BRPM | OXYGEN SATURATION: 97 % | TEMPERATURE: 97.6 F | WEIGHT: 193.5 LBS | DIASTOLIC BLOOD PRESSURE: 68 MMHG | BODY MASS INDEX: 35.61 KG/M2 | HEART RATE: 61 BPM | SYSTOLIC BLOOD PRESSURE: 132 MMHG

## 2022-01-14 DIAGNOSIS — R05.9 COUGH: ICD-10-CM

## 2022-01-14 DIAGNOSIS — Z01.818 PRE-OP EXAMINATION: ICD-10-CM

## 2022-01-14 DIAGNOSIS — H25.89 OTHER AGE-RELATED CATARACT OF BOTH EYES: Primary | ICD-10-CM

## 2022-01-14 PROBLEM — H26.9 CATARACTA: Status: ACTIVE | Noted: 2022-01-14

## 2022-01-14 PROCEDURE — 3078F DIAST BP <80 MM HG: CPT | Performed by: FAMILY MEDICINE

## 2022-01-14 PROCEDURE — U0003 INFECTIOUS AGENT DETECTION BY NUCLEIC ACID (DNA OR RNA); SEVERE ACUTE RESPIRATORY SYNDROME CORONAVIRUS 2 (SARS-COV-2) (CORONAVIRUS DISEASE [COVID-19]), AMPLIFIED PROBE TECHNIQUE, MAKING USE OF HIGH THROUGHPUT TECHNOLOGIES AS DESCRIBED BY CMS-2020-01-R: HCPCS | Performed by: FAMILY MEDICINE

## 2022-01-14 PROCEDURE — 3075F SYST BP GE 130 - 139MM HG: CPT | Performed by: FAMILY MEDICINE

## 2022-01-14 PROCEDURE — 1036F TOBACCO NON-USER: CPT | Performed by: FAMILY MEDICINE

## 2022-01-14 PROCEDURE — 1160F RVW MEDS BY RX/DR IN RCRD: CPT | Performed by: FAMILY MEDICINE

## 2022-01-14 PROCEDURE — 99214 OFFICE O/P EST MOD 30 MIN: CPT | Performed by: FAMILY MEDICINE

## 2022-01-14 PROCEDURE — 3725F SCREEN DEPRESSION PERFORMED: CPT | Performed by: FAMILY MEDICINE

## 2022-01-14 PROCEDURE — U0005 INFEC AGEN DETEC AMPLI PROBE: HCPCS | Performed by: FAMILY MEDICINE

## 2022-01-14 NOTE — PROGRESS NOTES
Assessment/Plan:  Cataracta  Getting surgery done on both eyes in February  Pre-op examination  She is an acceptable risk for the proposed procedure  Diagnoses and all orders for this visit:    Other age-related cataract of both eyes    Pre-op examination          There are no Patient Instructions on file for this visit  Return if symptoms worsen or fail to improve  Subjective:      Patient ID: Castillo Flower is a 80 y o  female  Chief Complaint   Patient presents with    Pre-op Exam       She is here today for preop clearance for cataract surgery on both eyes on February 1, 2022 and February 16, 2022  She denies any complaint other than visual problems  The following portions of the patient's history were reviewed and updated as appropriate: allergies, current medications, past family history, past medical history, past social history, past surgical history and problem list     Review of Systems   Constitutional: Negative for chills and fever  HENT: Negative for trouble swallowing  Eyes: Positive for visual disturbance  Respiratory: Negative for cough and shortness of breath  Cardiovascular: Negative for chest pain, palpitations and leg swelling  Gastrointestinal: Negative for abdominal pain, constipation and diarrhea  Endocrine: Negative for cold intolerance and heat intolerance  Genitourinary: Negative for difficulty urinating and dysuria  Skin: Negative for rash  Neurological: Negative for dizziness, tremors, seizures and headaches  Hematological: Negative for adenopathy  Psychiatric/Behavioral: Negative for behavioral problems           Current Outpatient Medications   Medication Sig Dispense Refill    amLODIPine (NORVASC) 5 mg tablet TAKE 1 TABLET BY MOUTH EVERY DAY 90 tablet 1    aspirin 81 mg chewable tablet Chew 81 mg daily      cholecalciferol (VITAMIN D3) 1,000 units tablet Take 1,000 Units by mouth daily      clotrimazole-betamethasone (LOTRISONE) 1-0 05 % cream Apply topically 2 (two) times a day 30 g 0    cyanocobalamin (VITAMIN B-12) 100 mcg tablet Take 1,000 mcg by mouth      dextran 70-hypromellose (Artificial Tears) 0 1-0 3 % ophthalmic solution Apply topically       doxazosin (CARDURA) 1 mg tablet       econazole nitrate 1 % cream APPLY DAILY TO RASH FOR 1 WEEK  THEN AS NEEDED FOR FLARE      erythromycin with ethanol (THERAMYCIN) 2 % external solution Apply topically daily 60 mL 0    fexofenadine (ALLEGRA) 180 MG tablet Take by mouth      hydrocortisone 2 5 % cream       hydroxyurea (HYDREA) 500 mg capsule TAKE 2 DAYS ON AND 1 DAY  capsule 3    ketoconazole (NIZORAL) 2 % cream       losartan (COZAAR) 100 MG tablet Take 1 tablet (100 mg total) by mouth daily 90 tablet 1    metoprolol tartrate (LOPRESSOR) 50 mg tablet TAKE 1/2 TABLET BY MOUTH EVERY 12 HOURS 90 tablet 1    metroNIDAZOLE (METROCREAM) 0 75 % cream Apply topically 2 (two) times a day 45 g 0    mometasone (ELOCON) 0 1 % lotion Apply topically daily 60 mL 0    nystatin (MYCOSTATIN) powder Apply topically 3 (three) times a day as needed (rash) 15 g 0    omeprazole (PriLOSEC) 20 mg delayed release capsule TAKE 1 CAPSULE BY MOUTH EVERY DAY 90 capsule 1    rosuvastatin (CRESTOR) 5 mg tablet TAKE 1 TABLET BY MOUTH EVERY DAY 90 tablet 1    sertraline (ZOLOFT) 50 mg tablet TAKE 1 TABLET BY MOUTH EVERY DAY 90 tablet 1    valACYclovir (VALTREX) 1,000 mg tablet Take 1,000 mg by mouth 3 (three) times a day  0     No current facility-administered medications for this visit  Objective:    /68 (BP Location: Left arm, Patient Position: Sitting, Cuff Size: Large)   Pulse 61   Temp 97 6 °F (36 4 °C) (Tympanic)   Resp 16   Ht 5' 2" (1 575 m)   Wt 87 8 kg (193 lb 8 oz)   LMP  (LMP Unknown)   SpO2 97%   BMI 35 39 kg/m²        Physical Exam  Vitals and nursing note reviewed  Constitutional:       Appearance: Normal appearance  She is well-developed     HENT:      Head: Normocephalic and atraumatic  Eyes:      Pupils: Pupils are equal, round, and reactive to light  Cardiovascular:      Rate and Rhythm: Normal rate and regular rhythm  Heart sounds: Normal heart sounds  Pulmonary:      Effort: Pulmonary effort is normal       Breath sounds: Normal breath sounds  Abdominal:      General: Bowel sounds are normal       Palpations: Abdomen is soft  Musculoskeletal:      Cervical back: Normal range of motion and neck supple  Right lower leg: No edema  Left lower leg: No edema  Lymphadenopathy:      Cervical: No cervical adenopathy  Skin:     General: Skin is warm  Neurological:      Mental Status: She is alert and oriented to person, place, and time     Psychiatric:         Mood and Affect: Mood normal                 Janette Millan MD

## 2022-01-15 LAB — SARS-COV-2 RNA RESP QL NAA+PROBE: NEGATIVE

## 2022-01-28 ENCOUNTER — TELEPHONE (OUTPATIENT)
Dept: FAMILY MEDICINE CLINIC | Facility: CLINIC | Age: 82
End: 2022-01-28

## 2022-01-28 ENCOUNTER — TELEPHONE (OUTPATIENT)
Dept: VASCULAR SURGERY | Facility: CLINIC | Age: 82
End: 2022-01-28

## 2022-01-28 NOTE — TELEPHONE ENCOUNTER
Phone call from [facility]  They received the first page of the clearance form for Sahil Chen which has "see attached" on it,but, none of the attachments came through  Please refax to [phone]   Pt is scheduled for 2/1

## 2022-01-28 NOTE — TELEPHONE ENCOUNTER
Attempted to contact patient to schedule appointment(s) listed below  Requested patient call [phone] option 3 to schedule appointment(s)  Patient's appointment(s) are past due, expected ASAP      Dopplers  [] Abdominal Aorta Iliac (AOIL)  [] Carotid (CV)   [] Celiac and/or Mesenteric  [] Endovascular Aortic Repair (EVAR)   [] Hemodialysis Access (HD)   [] Lower Limb Arterial (EDWARD) done on 9/13/21  [] Lower Limb Venous Duplex with Reflux (LEVDR)  [] Renal Artery  [] Upper Limb (UEA)    Advanced Imaging   [] CTA abdomen    [] CTA abdomen & pelvis    [] CT abdomen with/ without contrast  [] CT abdomen with contrast  [] CT abdomen without contrast    [] CT abdomen & pelvis with/ without contrast  [] CT abdomen & pelvis with contrast  [] CT abdomen & pelvis without contrast    Office Visit   [] New patient, patient last seen over 3 years ago  [] Risk factor modification (RFM)   [x] Follow up

## 2022-02-11 ENCOUNTER — TELEPHONE (OUTPATIENT)
Dept: HEMATOLOGY ONCOLOGY | Facility: CLINIC | Age: 82
End: 2022-02-11

## 2022-02-11 NOTE — TELEPHONE ENCOUNTER
I spoke with the patient in regards to scheduling a follow up appointment  Patient states she would like an appointment in March and for it to be in the afternoon  Informed patient the next available is 3/21/22 at 3:00pm  Patient accepted appointment date and time

## 2022-02-12 DIAGNOSIS — I10 ESSENTIAL HYPERTENSION: ICD-10-CM

## 2022-02-14 RX ORDER — LOSARTAN POTASSIUM 100 MG/1
TABLET ORAL
Qty: 90 TABLET | Refills: 1 | Status: SHIPPED | OUTPATIENT
Start: 2022-02-14 | End: 2022-04-08

## 2022-03-10 ENCOUNTER — RA CDI HCC (OUTPATIENT)
Dept: OTHER | Facility: HOSPITAL | Age: 82
End: 2022-03-10

## 2022-03-10 NOTE — PROGRESS NOTES
[facility]  coding opportunities          Number of diagnosis code(s) already on the problem list added to FYI fla  E11 36  E11 51  I20 9  I77 9  I73 9  [id number]                     Patients insurance company: [employer])

## 2022-03-14 ENCOUNTER — APPOINTMENT (OUTPATIENT)
Dept: LAB | Facility: IMAGING CENTER | Age: 82
End: 2022-03-14
Payer: COMMERCIAL

## 2022-03-14 DIAGNOSIS — D51.8 OTHER VITAMIN B12 DEFICIENCY ANEMIAS: ICD-10-CM

## 2022-03-14 DIAGNOSIS — N18.32 TYPE 2 DIABETES MELLITUS WITH STAGE 3B CHRONIC KIDNEY DISEASE, WITHOUT LONG-TERM CURRENT USE OF INSULIN (HCC): ICD-10-CM

## 2022-03-14 DIAGNOSIS — D45 PV (POLYCYTHEMIA VERA) (HCC): ICD-10-CM

## 2022-03-14 DIAGNOSIS — N18.32 STAGE 3B CHRONIC KIDNEY DISEASE (HCC): ICD-10-CM

## 2022-03-14 DIAGNOSIS — E11.22 TYPE 2 DIABETES MELLITUS WITH STAGE 3B CHRONIC KIDNEY DISEASE, WITHOUT LONG-TERM CURRENT USE OF INSULIN (HCC): ICD-10-CM

## 2022-03-14 LAB
ALBUMIN SERPL BCP-MCNC: 3.6 G/DL (ref 3.5–5)
ALP SERPL-CCNC: 85 U/L (ref 46–116)
ALT SERPL W P-5'-P-CCNC: 21 U/L (ref 12–78)
ANION GAP SERPL CALCULATED.3IONS-SCNC: 3 MMOL/L (ref 4–13)
AST SERPL W P-5'-P-CCNC: 20 U/L (ref 5–45)
BILIRUB SERPL-MCNC: 0.47 MG/DL (ref 0.2–1)
BUN SERPL-MCNC: 22 MG/DL (ref 5–25)
CALCIUM SERPL-MCNC: 9.4 MG/DL (ref 8.3–10.1)
CHLORIDE SERPL-SCNC: 112 MMOL/L (ref 100–108)
CHOLEST SERPL-MCNC: 140 MG/DL
CO2 SERPL-SCNC: 28 MMOL/L (ref 21–32)
CREAT SERPL-MCNC: 1.34 MG/DL (ref 0.6–1.3)
CREAT UR-MCNC: 108 MG/DL
CRP SERPL QL: <3 MG/L
ERYTHROCYTE [SEDIMENTATION RATE] IN BLOOD: 6 MM/HOUR (ref 0–29)
EST. AVERAGE GLUCOSE BLD GHB EST-MCNC: 105 MG/DL
GFR SERPL CREATININE-BSD FRML MDRD: 37 ML/MIN/1.73SQ M
GLUCOSE P FAST SERPL-MCNC: 106 MG/DL (ref 65–99)
HBA1C MFR BLD: 5.3 %
HDLC SERPL-MCNC: 52 MG/DL
LDH SERPL-CCNC: 208 U/L (ref 81–234)
LDLC SERPL CALC-MCNC: 58 MG/DL (ref 0–100)
MICROALBUMIN UR-MCNC: 23.9 MG/L (ref 0–20)
MICROALBUMIN/CREAT 24H UR: 22 MG/G CREATININE (ref 0–30)
PHOSPHATE SERPL-MCNC: 2.9 MG/DL (ref 2.3–4.1)
POTASSIUM SERPL-SCNC: 4.6 MMOL/L (ref 3.5–5.3)
PROT SERPL-MCNC: 6.9 G/DL (ref 6.4–8.2)
PTH-INTACT SERPL-MCNC: 86 PG/ML (ref 18.4–80.1)
SODIUM SERPL-SCNC: 143 MMOL/L (ref 136–145)
TRIGL SERPL-MCNC: 152 MG/DL
TSH SERPL DL<=0.05 MIU/L-ACNC: 1.35 UIU/ML (ref 0.36–3.74)
URATE SERPL-MCNC: 5.8 MG/DL (ref 2–6.8)
VIT B12 SERPL-MCNC: 1800 PG/ML (ref 100–900)

## 2022-03-14 PROCEDURE — 85652 RBC SED RATE AUTOMATED: CPT

## 2022-03-14 PROCEDURE — 80061 LIPID PANEL: CPT

## 2022-03-14 PROCEDURE — 82570 ASSAY OF URINE CREATININE: CPT | Performed by: FAMILY MEDICINE

## 2022-03-14 PROCEDURE — 83036 HEMOGLOBIN GLYCOSYLATED A1C: CPT

## 2022-03-14 PROCEDURE — 84443 ASSAY THYROID STIM HORMONE: CPT

## 2022-03-14 PROCEDURE — 83970 ASSAY OF PARATHORMONE: CPT

## 2022-03-14 PROCEDURE — 83615 LACTATE (LD) (LDH) ENZYME: CPT

## 2022-03-14 PROCEDURE — 82043 UR ALBUMIN QUANTITATIVE: CPT | Performed by: FAMILY MEDICINE

## 2022-03-14 PROCEDURE — 80053 COMPREHEN METABOLIC PANEL: CPT

## 2022-03-14 PROCEDURE — 82607 VITAMIN B-12: CPT

## 2022-03-14 PROCEDURE — 84550 ASSAY OF BLOOD/URIC ACID: CPT

## 2022-03-14 PROCEDURE — 84100 ASSAY OF PHOSPHORUS: CPT

## 2022-03-14 PROCEDURE — 86140 C-REACTIVE PROTEIN: CPT

## 2022-03-17 ENCOUNTER — OFFICE VISIT (OUTPATIENT)
Dept: FAMILY MEDICINE CLINIC | Facility: CLINIC | Age: 82
End: 2022-03-17
Payer: COMMERCIAL

## 2022-03-17 VITALS
SYSTOLIC BLOOD PRESSURE: 132 MMHG | BODY MASS INDEX: 35.41 KG/M2 | OXYGEN SATURATION: 97 % | DIASTOLIC BLOOD PRESSURE: 64 MMHG | WEIGHT: 192.4 LBS | RESPIRATION RATE: 16 BRPM | HEART RATE: 66 BPM | TEMPERATURE: 97.6 F | HEIGHT: 62 IN

## 2022-03-17 DIAGNOSIS — E11.3292 TYPE 2 DIABETES MELLITUS WITH LEFT EYE AFFECTED BY MILD NONPROLIFERATIVE RETINOPATHY WITHOUT MACULAR EDEMA, WITHOUT LONG-TERM CURRENT USE OF INSULIN (HCC): ICD-10-CM

## 2022-03-17 DIAGNOSIS — N18.32 STAGE 3B CHRONIC KIDNEY DISEASE (HCC): ICD-10-CM

## 2022-03-17 DIAGNOSIS — E78.49 OTHER HYPERLIPIDEMIA: ICD-10-CM

## 2022-03-17 DIAGNOSIS — E11.22 TYPE 2 DIABETES MELLITUS WITH STAGE 3B CHRONIC KIDNEY DISEASE, WITHOUT LONG-TERM CURRENT USE OF INSULIN (HCC): Primary | ICD-10-CM

## 2022-03-17 DIAGNOSIS — I25.10 ARTERIOSCLEROSIS OF CORONARY ARTERY: ICD-10-CM

## 2022-03-17 DIAGNOSIS — N18.32 TYPE 2 DIABETES MELLITUS WITH STAGE 3B CHRONIC KIDNEY DISEASE, WITHOUT LONG-TERM CURRENT USE OF INSULIN (HCC): Primary | ICD-10-CM

## 2022-03-17 DIAGNOSIS — I10 ESSENTIAL HYPERTENSION: ICD-10-CM

## 2022-03-17 DIAGNOSIS — F32.0 MAJOR DEPRESSIVE DISORDER, SINGLE EPISODE, MILD (HCC): ICD-10-CM

## 2022-03-17 PROCEDURE — 99214 OFFICE O/P EST MOD 30 MIN: CPT | Performed by: FAMILY MEDICINE

## 2022-03-17 RX ORDER — BROMFENAC SODIUM 0.7 MG/ML
SOLUTION/ DROPS OPHTHALMIC
COMMUNITY
Start: 2022-02-19 | End: 2022-04-08

## 2022-03-17 NOTE — ASSESSMENT & PLAN NOTE
Lab Results   Component Value Date    EGFR 37 03/14/2022    EGFR 39 09/20/2021    EGFR 48 07/28/2021    CREATININE 1 34 (H) 03/14/2022    CREATININE 1 30 09/20/2021    CREATININE 1 10 07/28/2021   Decreased GF R  Encourage oral hydration  Continue follow-up with Nephrology

## 2022-03-17 NOTE — ASSESSMENT & PLAN NOTE
A1c well controlled  GFR decreased  Continue to follow up with Nephrology    Lab Results   Component Value Date    HGBA1C 5 3 03/14/2022

## 2022-03-17 NOTE — ASSESSMENT & PLAN NOTE
Continue follow-up with Ophthalmology    Lab Results   Component Value Date    HGBA1C 5 3 03/14/2022

## 2022-03-17 NOTE — PROGRESS NOTES
Assessment/Plan:  Type 2 diabetes mellitus with chronic kidney disease (HCC)  A1c well controlled  GFR decreased  Continue to follow up with Nephrology  Lab Results   Component Value Date    HGBA1C 5 3 03/14/2022       Type 2 diabetes mellitus with left eye affected by mild nonproliferative retinopathy without macular edema, without long-term current use of insulin ([facility] [address] )  Continue follow-up with Ophthalmology  Lab Results   Component Value Date    HGBA1C 5 3 03/14/2022       Essential hypertension  Well controlled  Continue same  Will continue to monitor  Arteriosclerosis of coronary artery  Stable  Asymptomatic  Continue same  Continue to follow up with Cardiology  Stage 3b chronic kidney disease [facility])  Lab Results   Component Value Date    EGFR 37 03/14/2022    EGFR 39 09/20/2021    EGFR 48 07/28/2021    CREATININE 1 34 (H) 03/14/2022    CREATININE 1 30 09/20/2021    CREATININE 1 10 07/28/2021   Decreased GF R  Encourage oral hydration  Continue follow-up with Nephrology  Hyperlipidemia  Stable  Continue same  Will continue to monitor  Major depressive disorder, single episode, mild (HCC)  Stable  Continue same  Will continue to monitor         Diagnoses and all orders for this visit:    Type 2 diabetes mellitus with stage 3b chronic kidney disease, without long-term current use of insulin ([facility] )    Type 2 diabetes mellitus with left eye affected by mild nonproliferative retinopathy without macular edema, without long-term current use of insulin (HCC)    Essential hypertension    Arteriosclerosis of coronary artery    Stage 3b chronic kidney disease (HCC)    Other hyperlipidemia    Major depressive disorder, single episode, mild (HCC)    Other orders  -     Prolensa 0 07 % SOLN; INSTILL 1 DROP BY ONCE A DAY INTO LEFT EYE STARTING WHEN YOU GET HOME FROM SURGERY FOR 28 DAYS (Patient not taking: Reported on 3/17/2022)          There are no Patient Instructions on file for this visit  Return in about 4 months (around 7/17/2022)  Subjective:      Patient ID: Rosie Flor is a 80 y o  female  Chief Complaint   Patient presents with    Hypertension    Diabetes    Hyperlipidemia       She is here today for follow-up multiple medical problems  She has been taking her medications  She denies any side effects from her medications  She had cataract surgery recently  She had her blood work done recently came back with low GFR  All the rest of blood work came back  The following portions of the patient's history were reviewed and updated as appropriate: allergies, current medications, past family history, past medical history, past social history, past surgical history and problem list     Review of Systems   Constitutional: Negative for chills and fever  HENT: Negative for trouble swallowing  Eyes: Negative for visual disturbance  Respiratory: Negative for cough and shortness of breath  Cardiovascular: Negative for chest pain, palpitations and leg swelling  Gastrointestinal: Negative for abdominal pain, constipation and diarrhea  Endocrine: Negative for cold intolerance and heat intolerance  Genitourinary: Negative for difficulty urinating and dysuria  Musculoskeletal: Negative for gait problem  Skin: Negative for rash  Neurological: Negative for dizziness, tremors, seizures and headaches  Hematological: Negative for adenopathy  Psychiatric/Behavioral: Negative for behavioral problems           Current Outpatient Medications   Medication Sig Dispense Refill    amLODIPine (NORVASC) 5 mg tablet TAKE 1 TABLET BY MOUTH EVERY DAY 90 tablet 1    aspirin 81 mg chewable tablet Chew 81 mg daily      cholecalciferol (VITAMIN D3) 1,000 units tablet Take 1,000 Units by mouth daily      clotrimazole-betamethasone (LOTRISONE) 1-0 05 % cream Apply topically 2 (two) times a day 30 g 0    cyanocobalamin (VITAMIN B-12) 100 mcg tablet Take 1,000 mcg by mouth  dextran 70-hypromellose (Artificial Tears) 0 1-0 3 % ophthalmic solution Apply topically       doxazosin (CARDURA) 1 mg tablet       econazole nitrate 1 % cream APPLY DAILY TO RASH FOR 1 WEEK  THEN AS NEEDED FOR FLARE      erythromycin with ethanol (THERAMYCIN) 2 % external solution Apply topically daily 60 mL 0    hydrocortisone 2 5 % cream       hydroxyurea (HYDREA) 500 mg capsule TAKE 2 DAYS ON AND 1 DAY  capsule 3    ketoconazole (NIZORAL) 2 % cream       losartan (COZAAR) 100 MG tablet TAKE 1 TABLET BY MOUTH EVERY DAY 90 tablet 1    metoprolol tartrate (LOPRESSOR) 50 mg tablet TAKE 1/2 TABLET BY MOUTH EVERY 12 HOURS 90 tablet 1    metroNIDAZOLE (METROCREAM) 0 75 % cream Apply topically 2 (two) times a day 45 g 0    mometasone (ELOCON) 0 1 % lotion Apply topically daily 60 mL 0    nystatin (MYCOSTATIN) powder Apply topically 3 (three) times a day as needed (rash) 15 g 0    omeprazole (PriLOSEC) 20 mg delayed release capsule TAKE 1 CAPSULE BY MOUTH EVERY DAY 90 capsule 1    rosuvastatin (CRESTOR) 5 mg tablet TAKE 1 TABLET BY MOUTH EVERY DAY 90 tablet 1    sertraline (ZOLOFT) 50 mg tablet TAKE 1 TABLET BY MOUTH EVERY DAY 90 tablet 1    fexofenadine (ALLEGRA) 180 MG tablet Take by mouth (Patient not taking: Reported on 3/17/2022 )      Prolensa 0 07 % SOLN INSTILL 1 DROP BY ONCE A DAY INTO LEFT EYE STARTING WHEN YOU GET HOME FROM SURGERY FOR 28 DAYS (Patient not taking: Reported on 3/17/2022)      valACYclovir (VALTREX) 1,000 mg tablet Take 1,000 mg by mouth 3 (three) times a day (Patient not taking: Reported on 3/17/2022 )  0     No current facility-administered medications for this visit         Objective:    /64 (BP Location: Left arm, Patient Position: Sitting, Cuff Size: Large)   Pulse 66   Temp 97 6 °F (36 4 °C) (Tympanic)   Resp 16   Ht 5' 2" (1 575 m)   Wt 87 3 kg (192 lb 6 4 oz)   LMP  (LMP Unknown)   SpO2 97%   BMI 35 19 kg/m²        Physical Exam  Vitals and nursing note reviewed  Constitutional:       Appearance: She is well-developed  HENT:      Head: Normocephalic and atraumatic  Eyes:      Pupils: Pupils are equal, round, and reactive to light  Cardiovascular:      Rate and Rhythm: Normal rate and regular rhythm  Heart sounds: Normal heart sounds  Pulmonary:      Effort: Pulmonary effort is normal       Breath sounds: Normal breath sounds  Abdominal:      General: Bowel sounds are normal       Palpations: Abdomen is soft  Musculoskeletal:         General: Normal range of motion  Cervical back: Normal range of motion and neck supple  Lymphadenopathy:      Cervical: No cervical adenopathy  Skin:     General: Skin is warm  Neurological:      Mental Status: She is alert and oriented to person, place, and time  Cranial Nerves: No cranial nerve deficit                  Rolando Saha MD

## 2022-03-21 ENCOUNTER — OFFICE VISIT (OUTPATIENT)
Dept: HEMATOLOGY ONCOLOGY | Facility: CLINIC | Age: 82
End: 2022-03-21
Payer: COMMERCIAL

## 2022-03-21 VITALS
TEMPERATURE: 97.8 F | RESPIRATION RATE: 16 BRPM | SYSTOLIC BLOOD PRESSURE: 158 MMHG | HEART RATE: 57 BPM | OXYGEN SATURATION: 97 % | WEIGHT: 192.6 LBS | HEIGHT: 62 IN | BODY MASS INDEX: 35.44 KG/M2 | DIASTOLIC BLOOD PRESSURE: 62 MMHG

## 2022-03-21 DIAGNOSIS — D45 PV (POLYCYTHEMIA VERA) (HCC): Primary | ICD-10-CM

## 2022-03-21 DIAGNOSIS — D51.8 OTHER VITAMIN B12 DEFICIENCY ANEMIAS: ICD-10-CM

## 2022-03-21 PROCEDURE — 3078F DIAST BP <80 MM HG: CPT | Performed by: INTERNAL MEDICINE

## 2022-03-21 PROCEDURE — 99214 OFFICE O/P EST MOD 30 MIN: CPT | Performed by: INTERNAL MEDICINE

## 2022-03-21 PROCEDURE — 1160F RVW MEDS BY RX/DR IN RCRD: CPT | Performed by: INTERNAL MEDICINE

## 2022-03-21 PROCEDURE — 1036F TOBACCO NON-USER: CPT | Performed by: INTERNAL MEDICINE

## 2022-03-21 PROCEDURE — 3077F SYST BP >= 140 MM HG: CPT | Performed by: INTERNAL MEDICINE

## 2022-03-21 NOTE — PROGRESS NOTES
Hematology/Oncology Outpatient Follow-up  Pretty Torres 80 y o  female 1940 [id number]    Date:  3/21/2022        Assessment and Plan:  1  PV (polycythemia vera) (HCC)  The patient seems to be at the goal with hematocrit level less than 45% at the current dose of Hydrea 500 mg daily 2 days on 1 day off  I did encourage her to continue with the same Hydrea dose without interruption  She also was encouraged to continue with baby aspirin daily  We will continue to monitor her closely  - Comprehensive metabolic panel; Future  - CBC and differential; Future  - Magnesium; Future  - LD,Blood; Future    2  Other vitamin B12 deficiency anemias  In B12 is above the normal range on the current oral vitamin B12 supplements  HPI:  The patient came today for follow-up visit  She continues to take the [facility] on the current dose of 500 mg day 2 days on and 1 day off  Most recent blood work on 03/14/2020 to showed hemoglobin of 14 0 with hematocrit of 43 9  MCV was 107 with normal white cells and platelets  The white cell differential is within normal range  Creatinine 1 3 with normal calcium liver enzymes  Vitamin B12 1800  TSH 1 35  LDH was normal   Inflammation markers were within normal range  Oncology History Overview Note   Patient had extensive workup for further evaluation of her elevated H&H  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, which is diagnostic for polycythemia vera  She was started on Hydrea 500 mg once a day February 10, 2016  The dose was increased to 500 mg twice a day which resulted in significant drop of her platelet count  We did then decrease the dose and put on hold April 4, 2016  Patient had 2 phlebotomies while she was off the Hydrea  She is currently on Hydrea 500 mg once a day 2 days on and 1 day off and tolerating this dose well         Polycythemia vera ([facility] [address] )   2/3/2016 Initial Diagnosis    PV (polycythemia vera) (HCC)         Interval history:    ROS: Review of Systems   Constitutional: Positive for fatigue  Negative for chills and fever  HENT: Negative for ear pain and sore throat  Eyes: Negative for pain and visual disturbance  Respiratory: Positive for shortness of breath  Negative for cough  Cardiovascular: Negative for chest pain and palpitations  Gastrointestinal: Negative for abdominal pain and vomiting  Genitourinary: Negative for dysuria and hematuria  Musculoskeletal: Negative for arthralgias and back pain  Skin: Positive for rash  Negative for color change  Neurological: Positive for dizziness and numbness  Negative for seizures and syncope  All other systems reviewed and are negative        Past Medical History:   Diagnosis Date    CAD (coronary artery disease)        Past Surgical History:   Procedure Laterality Date    BREAST BIOPSY      CAROTID ENDARTARECTOMY Left     CHOLECYSTECTOMY       and 2012    HYSTERECTOMY  1981    KNEE CARTILAGE SURGERY Right 2001    OOPHORECTOMY  2010    REPLACEMENT TOTAL KNEE  2001       Social History     Socioeconomic History    Marital status: /Civil Union     Spouse name: None    Number of children: None    Years of education: None    Highest education level: None   Occupational History    None   Tobacco Use    Smoking status: Former Smoker     Types: Cigarettes     Quit date:      Years since quittin 2    Smokeless tobacco: Never Used    Tobacco comment: no passive smoke exposure   Vaping Use    Vaping Use: Never used   Substance and Sexual Activity    Alcohol use: No    Drug use: No    Sexual activity: None   Other Topics Concern    None   Social History Narrative    None     Social Determinants of Health     Financial Resource Strain: Not on file   Food Insecurity: Not on file   Transportation Needs: Not on file   Physical Activity: Not on file   Stress: Not on file   Social Connections: Not on file   Intimate Partner Violence: Not on file   Housing Stability: Not on file       Family History   Problem Relation Age of Onset    Heart disease Mother     Hypertension Mother     Diabetes type II Mother     Cancer Father     Cystic fibrosis Daughter     Breast cancer Maternal Aunt 48    Breast cancer Cousin 30       Allergies   Allergen Reactions    Oxycodone Diarrhea, Dizziness, GI Intolerance, Headache, Irritability and Tremor     Other reaction(s): Anxiety, Nausea and/or vomiting, Other (See Comments)  Dizziness      No Active Allergies          Current Outpatient Medications:     amLODIPine (NORVASC) 5 mg tablet, TAKE 1 TABLET BY MOUTH EVERY DAY, Disp: 90 tablet, Rfl: 1    aspirin 81 mg chewable tablet, Chew 81 mg daily, Disp: , Rfl:     cholecalciferol (VITAMIN D3) 1,000 units tablet, Take 1,000 Units by mouth daily, Disp: , Rfl:     cyanocobalamin (VITAMIN B-12) 100 mcg tablet, Take 1,000 mcg by mouth, Disp: , Rfl:     doxazosin (CARDURA) 1 mg tablet, , Disp: , Rfl:     hydroxyurea (HYDREA) 500 mg capsule, TAKE 2 DAYS ON AND 1 DAY OFF, Disp: 180 capsule, Rfl: 3    metoprolol tartrate (LOPRESSOR) 50 mg tablet, TAKE 1/2 TABLET BY MOUTH EVERY 12 HOURS, Disp: 90 tablet, Rfl: 1    omeprazole (PriLOSEC) 20 mg delayed release capsule, TAKE 1 CAPSULE BY MOUTH EVERY DAY, Disp: 90 capsule, Rfl: 1    rosuvastatin (CRESTOR) 5 mg tablet, TAKE 1 TABLET BY MOUTH EVERY DAY, Disp: 90 tablet, Rfl: 1    sertraline (ZOLOFT) 50 mg tablet, TAKE 1 TABLET BY MOUTH EVERY DAY, Disp: 90 tablet, Rfl: 1    clotrimazole-betamethasone (LOTRISONE) 1-0 05 % cream, Apply topically 2 (two) times a day (Patient not taking: Reported on 3/21/2022 ), Disp: 30 g, Rfl: 0    dextran 70-hypromellose (Artificial Tears) 0 1-0 3 % ophthalmic solution, Apply topically  (Patient not taking: Reported on 3/21/2022 ), Disp: , Rfl:     econazole nitrate 1 % cream, APPLY DAILY TO RASH FOR 1 WEEK   THEN AS NEEDED FOR FLARE (Patient not taking: Reported on 3/21/2022), Disp: , Rfl:     erythromycin with ethanol (THERAMYCIN) 2 % external solution, Apply topically daily (Patient not taking: Reported on 3/21/2022 ), Disp: 60 mL, Rfl: 0    fexofenadine (ALLEGRA) 180 MG tablet, Take by mouth (Patient not taking: Reported on 3/17/2022 ), Disp: , Rfl:     hydrocortisone 2 5 % cream, , Disp: , Rfl:     ketoconazole (NIZORAL) 2 % cream, , Disp: , Rfl:     losartan (COZAAR) 100 MG tablet, TAKE 1 TABLET BY MOUTH EVERY DAY (Patient not taking: Reported on 3/21/2022), Disp: 90 tablet, Rfl: 1    metroNIDAZOLE (METROCREAM) 0 75 % cream, Apply topically 2 (two) times a day (Patient not taking: Reported on 3/21/2022 ), Disp: 45 g, Rfl: 0    mometasone (ELOCON) 0 1 % lotion, Apply topically daily (Patient not taking: Reported on 3/21/2022 ), Disp: 60 mL, Rfl: 0    nystatin (MYCOSTATIN) powder, Apply topically 3 (three) times a day as needed (rash) (Patient not taking: Reported on 3/21/2022 ), Disp: 15 g, Rfl: 0    Prolensa 0 07 % SOLN, INSTILL 1 DROP BY ONCE A DAY INTO LEFT EYE STARTING WHEN YOU GET HOME FROM SURGERY FOR 28 DAYS (Patient not taking: Reported on 3/17/2022), Disp: , Rfl:     valACYclovir (VALTREX) 1,000 mg tablet, Take 1,000 mg by mouth 3 (three) times a day (Patient not taking: Reported on 3/17/2022 ), Disp: , Rfl: 0      Physical Exam:  /62 (BP Location: Left arm, Patient Position: Sitting, Cuff Size: Adult)   Pulse 57   Temp 97 8 °F (36 6 °C) (Tympanic)   Resp 16   Ht 5' 2" (1 575 m)   Wt 87 4 kg (192 lb 9 6 oz)   LMP  (LMP Unknown)   SpO2 97%   BMI 35 23 kg/m²     Physical Exam  Constitutional:       General: She is not in acute distress  Appearance: She is well-developed  She is obese  She is not diaphoretic  HENT:      Head: Normocephalic and atraumatic  Eyes:      General: No scleral icterus  Right eye: No discharge  Left eye: No discharge        Conjunctiva/sclera: Conjunctivae normal       Pupils: Pupils are equal, round, and reactive to light    Neck:      Thyroid: No thyromegaly  Vascular: No JVD  Trachea: No tracheal deviation  Cardiovascular:      Rate and Rhythm: Normal rate and regular rhythm  Heart sounds: Normal heart sounds  No murmur heard  No friction rub  Pulmonary:      Effort: Pulmonary effort is normal  No respiratory distress  Breath sounds: Normal breath sounds  No stridor  No wheezing or rales  Chest:      Chest wall: No tenderness  Abdominal:      General: There is no distension  Palpations: Abdomen is soft  There is no hepatomegaly or splenomegaly  Tenderness: There is no abdominal tenderness  There is no guarding or rebound  Musculoskeletal:         General: No tenderness or deformity  Normal range of motion  Cervical back: Normal range of motion and neck supple  Lymphadenopathy:      Cervical: No cervical adenopathy  Skin:     General: Skin is warm and dry  Coloration: Skin is not pale  Findings: No erythema or rash  Neurological:      Mental Status: She is alert and oriented to person, place, and time  Cranial Nerves: No cranial nerve deficit  Coordination: Coordination normal       Deep Tendon Reflexes: Reflexes are normal and symmetric  Psychiatric:         Behavior: Behavior normal          Thought Content:  Thought content normal          Judgment: Judgment normal            Labs:  Lab Results   Component Value Date    WBC 4 73 03/14/2022    HGB 14 0 03/14/2022    HCT 43 9 03/14/2022     (H) 03/14/2022     03/14/2022     Lab Results   Component Value Date    K 4 6 03/14/2022     (H) 03/14/2022    CO2 28 03/14/2022    BUN 22 03/14/2022    CREATININE 1 34 (H) 03/14/2022    GLUF 106 (H) 03/14/2022    CALCIUM 9 4 03/14/2022    CORRECTEDCA 9 8 07/28/2021    AST 20 03/14/2022    ALT 21 03/14/2022    ALKPHOS 85 03/14/2022    EGFR 37 03/14/2022     Lab Results   Component Value Date    TSH 1 64 04/26/2018       Patient voiced understanding and agreement in the above discussion  Aware to contact our office with questions/symptoms in the interim

## 2022-04-08 ENCOUNTER — OFFICE VISIT (OUTPATIENT)
Dept: VASCULAR SURGERY | Facility: CLINIC | Age: 82
End: 2022-04-08
Payer: COMMERCIAL

## 2022-04-08 VITALS
HEIGHT: 62 IN | TEMPERATURE: 96.2 F | SYSTOLIC BLOOD PRESSURE: 136 MMHG | BODY MASS INDEX: 35.55 KG/M2 | HEART RATE: 63 BPM | WEIGHT: 193.2 LBS | DIASTOLIC BLOOD PRESSURE: 62 MMHG | OXYGEN SATURATION: 99 %

## 2022-04-08 DIAGNOSIS — Z98.890: Primary | ICD-10-CM

## 2022-04-08 PROCEDURE — 1036F TOBACCO NON-USER: CPT | Performed by: NURSE PRACTITIONER

## 2022-04-08 PROCEDURE — 3075F SYST BP GE 130 - 139MM HG: CPT | Performed by: NURSE PRACTITIONER

## 2022-04-08 PROCEDURE — 99214 OFFICE O/P EST MOD 30 MIN: CPT | Performed by: NURSE PRACTITIONER

## 2022-04-08 PROCEDURE — 3078F DIAST BP <80 MM HG: CPT | Performed by: NURSE PRACTITIONER

## 2022-04-08 PROCEDURE — 1160F RVW MEDS BY RX/DR IN RCRD: CPT | Performed by: NURSE PRACTITIONER

## 2022-04-08 NOTE — ASSESSMENT & PLAN NOTE
80 y o  female patient w/ PMH significant for HTN, HLD, DM2, and right above knee pop to below-knee popliteal bypass back in 2005 by Dr Jazlyn Monsalve as a result of complications (iatrogenic popliteal artery injury) during total knee replacement  Prolonged postoperative course/wound care including VAC therapy  Patient presents today for a follow up evaluation to discuss updated EDWARD duplex  Imaging  · EDWARD duplex (9/13/21) demonstrates the following:  · RLE: Patent proximal to distal popliteal artery bypass graft with diffuse disease noted throughout the remaining portions of the femoral-popliteal arteries without significant focal stenosis  MILY: 1 28, normal range (prior 1 36)  PVR/ PPG tracings are normal  Metatarsal pressure of 171 mm Hg  Great toe pressure of 103 mm Hg, above healing threshold (prior 119 mm Hg)  · LLE: Diffuse disease noted throughout the femoral-popliteal arteries without significant focal stenosis  Ankle/Brachial index: 1 08, normal range (prior 1 15)  PVR/ PPG tracings are normal  Metatarsal pressure of 132 mm Hg  Great toe pressure of 97 mm Hg, above healing threshold (prior 117 mm Hg)  Recommendations  · Patent proximal to distal popliteal artery bypass graft with palpable pulses at the graft site and distally  · Continue medical optimization with ASA and rosuvastatin   Recommend walking program, ambulating at least 30 minutes 3-5 times weekly   Recommend moisturization of the lower extremities, avoidance of injury and close observation of bilateral feet for any new wounds    · Continue surveillance with EDWARD duplex yearly  · Follow up post imaging to review

## 2022-04-08 NOTE — PROGRESS NOTES
Assessment/Plan:    Status post popliteal-distal bypass surgery  80 y o  female patient w/ PMH significant for HTN, HLD, DM2, and right above knee pop to below-knee popliteal bypass back in 2005 by Dr Juany Dunne as a result of complications (iatrogenic popliteal artery injury) during total knee replacement  Prolonged postoperative course/wound care including VAC therapy  Patient presents today for a follow up evaluation to discuss updated EDWARD duplex  Imaging  · EDWARD duplex (9/13/21) demonstrates the following:  · RLE: Patent proximal to distal popliteal artery bypass graft with diffuse disease noted throughout the remaining portions of the femoral-popliteal arteries without significant focal stenosis  MILY: 1 28, normal range (prior 1 36)  PVR/ PPG tracings are normal  Metatarsal pressure of 171 mm Hg  Great toe pressure of 103 mm Hg, above healing threshold (prior 119 mm Hg)  · LLE: Diffuse disease noted throughout the femoral-popliteal arteries without significant focal stenosis  Ankle/Brachial index: 1 08, normal range (prior 1 15)  PVR/ PPG tracings are normal  Metatarsal pressure of 132 mm Hg  Great toe pressure of 97 mm Hg, above healing threshold (prior 117 mm Hg)  Recommendations  · Patent proximal to distal popliteal artery bypass graft with palpable pulses at the graft site and distally  · Continue medical optimization with ASA and rosuvastatin   Recommend walking program, ambulating at least 30 minutes 3-5 times weekly   Recommend moisturization of the lower extremities, avoidance of injury and close observation of bilateral feet for any new wounds  · Continue surveillance with EDWARD duplex yearly  · Follow up post imaging to review       Diagnoses and all orders for this visit:    Status post popliteal-distal bypass surgery  -     VAS lower limb arterial duplex, complete bilateral; Future          Subjective:      Patient ID: Ghulam Sutton is a 80 y o  female      HPI:     80 y o  female with a past medical history significant for HTN, HLD, DM2, and right above knee pop to below-knee popliteal bypass back in 2005 by Dr Jazlyn Monsalve as a result of complications (iatrogenic popliteal artery injury) during total knee replacement  Prolonged postoperative course/wound care including VAC therapy  Patient presents today for a follow up evaluation to discuss updated EDWARD duplex  Patient is here to review results of EDWARD done 9/13/2021  Patient is s/p right above knee to below knee popliteal bypass, done in 2005 by Dr Jazlyn Monsalve for Iatrogenic popliteal artery injury that occurred during total knee replacement surgery  She is having c/o swelling and numbness in right knee to the foot but states this is chronic since her R TKA  Denies any discoloration or open wounds or pain while walking  She is currently taking ASA 81 and Rosuvastatin  She is a former smoker  Review of Systems   Constitutional: Negative  HENT: Negative  Eyes: Negative  Respiratory: Negative  Cardiovascular: Positive for leg swelling  Gastrointestinal: Negative  Endocrine: Negative  Genitourinary: Negative  Musculoskeletal: Positive for arthralgias  Skin: Negative  Allergic/Immunologic: Negative  Neurological: Positive for weakness and numbness  Hematological: Bruises/bleeds easily  Psychiatric/Behavioral: The patient is nervous/anxious  Depression  The following portions of the patient's history were reviewed and updated as appropriate: allergies, current medications, past family history, past medical history, past social history, past surgical history, and problem list     I have reviewed and made appropriate changes to the review of systems input by the medical assistant      Vitals:    04/08/22 1020   BP: 136/62   BP Location: Left arm   Patient Position: Sitting   Cuff Size: Standard   Pulse: 63   Temp: (!) 96 2 °F (35 7 °C)   TempSrc: Tympanic   SpO2: 99%   Weight: 87 6 kg (193 lb 3 2 oz) Height: 5' 2" (1 575 m)       Patient Active Problem List   Diagnosis    Polycythemia vera ([facility] [address] )    Other vitamin B12 deficiency anemias    Allergic rhinitis    Angina pectoris ([facility])    Anxiety    Arteriosclerosis of coronary artery    Benign neoplasm of colon    Carotid stenosis    Cholecystitis    Colon polyp    Depression    Gastroesophageal reflux disease    Herpes zoster    Hyperlipidemia    Osteoarthritis    Morbid obesity ([address] )    Lymphedema    Posterior vitreous detachment    Renal failure    Retinal detachment    Vitamin D deficiency    Hypertensive kidney disease with chronic kidney disease, stage 1-4 or unspecified chronic kidney disease    Skin infection    Psoriasis    Healthcare maintenance    PAOD (peripheral arterial occlusive disease) ([facility])    Injury of right popliteal artery    Stage 3b chronic kidney disease ([facility])    Chronic pain of left ankle    Dermatitis    Major depressive disorder, single episode, mild ([facility])    Type 2 diabetes mellitus with left eye affected by mild nonproliferative retinopathy without macular edema, without long-term current use of insulin ([facility])    Acute left-sided low back pain with left-sided sciatica    Type 2 diabetes mellitus with chronic kidney disease ([facility])    Essential hypertension    Blister of scalp with infection    Unspecified atherosclerosis of native arteries of extremities, bilateral legs ([facility])    Peripheral vascular disease, unspecified ([facility] [address] )    Type 2 diabetes mellitus with diabetic cataract ([facility] [address] )    Age-related nuclear cataract, bilateral    Type 2 diabetes mellitus with diabetic peripheral angiopathy without gangrene ([address] )    Cataracta    Pre-op examination    Status post popliteal-distal bypass surgery       Past Surgical History:   Procedure Laterality Date    BREAST BIOPSY      CAROTID ENDARTARECTOMY Left     CHOLECYSTECTOMY      2011 and 2012    HYSTERECTOMY  1981    KNEE CARTILAGE SURGERY Right 2001  OOPHORECTOMY  2010    REPLACEMENT TOTAL KNEE         Family History   Problem Relation Age of Onset    Heart disease Mother     Hypertension Mother     Diabetes type II Mother     Cancer Father     Cystic fibrosis Daughter     Breast cancer Maternal Aunt 48    Breast cancer Cousin 27       Social History     Socioeconomic History    Marital status: /Civil Union     Spouse name: Not on file    Number of children: Not on file    Years of education: Not on file    Highest education level: Not on file   Occupational History    Not on file   Tobacco Use    Smoking status: Former Smoker     Types: Cigarettes     Quit date:      Years since quittin 3    Smokeless tobacco: Never Used    Tobacco comment: no passive smoke exposure   Vaping Use    Vaping Use: Never used   Substance and Sexual Activity    Alcohol use: No    Drug use: No    Sexual activity: Not on file   Other Topics Concern    Not on file   Social History Narrative    Not on file     Social Determinants of Health     Financial Resource Strain: Not on file   Food Insecurity: Not on file   Transportation Needs: Not on file   Physical Activity: Not on file   Stress: Not on file   Social Connections: Not on file   Intimate Partner Violence: Not on file   Housing Stability: Not on file       Allergies   Allergen Reactions    Oxycodone Diarrhea, Dizziness, GI Intolerance, Headache, Irritability and Tremor     Other reaction(s):  Anxiety, Nausea and/or vomiting, Other (See Comments)  Dizziness      No Active Allergies          Current Outpatient Medications:     amLODIPine (NORVASC) 5 mg tablet, TAKE 1 TABLET BY MOUTH EVERY DAY, Disp: 90 tablet, Rfl: 1    aspirin 81 mg chewable tablet, Chew 81 mg daily, Disp: , Rfl:     cholecalciferol (VITAMIN D3) 1,000 units tablet, Take 1,000 Units by mouth daily, Disp: , Rfl:     cyanocobalamin (VITAMIN B-12) 100 mcg tablet, Take 1,000 mcg by mouth, Disp: , Rfl:     doxazosin (CARDURA) 1 mg tablet, , Disp: , Rfl:     hydroxyurea (HYDREA) 500 mg capsule, TAKE 2 DAYS ON AND 1 DAY OFF, Disp: 180 capsule, Rfl: 3    metoprolol tartrate (LOPRESSOR) 50 mg tablet, TAKE 1/2 TABLET BY MOUTH EVERY 12 HOURS, Disp: 90 tablet, Rfl: 1    omeprazole (PriLOSEC) 20 mg delayed release capsule, TAKE 1 CAPSULE BY MOUTH EVERY DAY, Disp: 90 capsule, Rfl: 1    rosuvastatin (CRESTOR) 5 mg tablet, TAKE 1 TABLET BY MOUTH EVERY DAY, Disp: 90 tablet, Rfl: 1    sertraline (ZOLOFT) 50 mg tablet, TAKE 1 TABLET BY MOUTH EVERY DAY, Disp: 90 tablet, Rfl: 1      Objective:    /62 (BP Location: Left arm, Patient Position: Sitting, Cuff Size: Standard)   Pulse 63   Temp (!) 96 2 °F (35 7 °C) (Tympanic)   Ht 5' 2" (1 575 m)   Wt 87 6 kg (193 lb 3 2 oz)   LMP  (LMP Unknown)   SpO2 99%   BMI 35 34 kg/m²        Physical Exam  Vitals reviewed  HENT:      Head: Normocephalic  Mouth/Throat:      Mouth: Mucous membranes are moist    Eyes:      Extraocular Movements: Extraocular movements intact  Cardiovascular:      Rate and Rhythm: Normal rate and regular rhythm  Pulses:           Dorsalis pedis pulses are 1+ on the right side and 1+ on the left side  Posterior tibial pulses are 1+ on the right side and 1+ on the left side  Pulmonary:      Effort: Pulmonary effort is normal  No respiratory distress  Musculoskeletal:         General: Normal range of motion  Right lower leg: Edema (mild, non-pitting) present  Left lower leg: Edema (mild, non-pitting) present  Skin:     General: Skin is warm and dry  Capillary Refill: Capillary refill takes less than 2 seconds  Findings: No erythema or wound  Neurological:      General: No focal deficit present  Mental Status: She is alert and oriented to person, place, and time  Cranial Nerves: Cranial nerves are intact  Motor: Motor function is intact  Gait: Gait is intact     Psychiatric:         Mood and Affect: Mood normal          Behavior: Behavior normal                Fofana Freeze, [address]   Vascular Surgery  4/8/2022 1:48 PM

## 2022-05-02 DIAGNOSIS — K21.9 GASTROESOPHAGEAL REFLUX DISEASE WITHOUT ESOPHAGITIS: ICD-10-CM

## 2022-05-02 RX ORDER — OMEPRAZOLE 20 MG/1
20 CAPSULE, DELAYED RELEASE ORAL DAILY
Qty: 90 CAPSULE | Refills: 1 | Status: SHIPPED | OUTPATIENT
Start: 2022-05-02

## 2022-05-13 DIAGNOSIS — I10 ESSENTIAL HYPERTENSION: ICD-10-CM

## 2022-05-13 RX ORDER — AMLODIPINE BESYLATE 5 MG/1
TABLET ORAL
Qty: 90 TABLET | Refills: 1 | Status: SHIPPED | OUTPATIENT
Start: 2022-05-13

## 2022-06-02 DIAGNOSIS — E78.49 OTHER HYPERLIPIDEMIA: ICD-10-CM

## 2022-06-02 RX ORDER — ROSUVASTATIN CALCIUM 5 MG/1
TABLET, COATED ORAL
Qty: 90 TABLET | Refills: 1 | Status: SHIPPED | OUTPATIENT
Start: 2022-06-02

## 2022-06-02 NOTE — TELEPHONE ENCOUNTER
Pharmacy requesting refill on Crestor  Last seen 3/17/22  Has appt 7/18/22  Medication sent to pharmacy

## 2022-06-10 DIAGNOSIS — F32.0 CURRENT MILD EPISODE OF MAJOR DEPRESSIVE DISORDER, UNSPECIFIED WHETHER RECURRENT (HCC): ICD-10-CM

## 2022-06-10 DIAGNOSIS — I10 ESSENTIAL HYPERTENSION: ICD-10-CM

## 2022-06-10 RX ORDER — METOPROLOL TARTRATE 50 MG/1
TABLET, FILM COATED ORAL
Qty: 90 TABLET | Refills: 1 | Status: SHIPPED | OUTPATIENT
Start: 2022-06-10

## 2022-06-10 NOTE — TELEPHONE ENCOUNTER
Pharmacy requesting refill on metoprolol and zoloft  Last seen 3/17/22  Has appt 7/18/22  Medication sent to pharmacy

## 2022-07-03 ENCOUNTER — TELEPHONE (OUTPATIENT)
Dept: OTHER | Facility: OTHER | Age: 82
End: 2022-07-03

## 2022-07-03 NOTE — TELEPHONE ENCOUNTER
Dane Beltran called in stating pt tested positive for covid  Symptom onset 7/1  They ordered low dose of paxlovid  Pt is stable at home

## 2022-07-05 ENCOUNTER — TELEPHONE (OUTPATIENT)
Dept: FAMILY MEDICINE CLINIC | Facility: CLINIC | Age: 82
End: 2022-07-05

## 2022-07-11 ENCOUNTER — RA CDI HCC (OUTPATIENT)
Dept: OTHER | Facility: HOSPITAL | Age: 82
End: 2022-07-11

## 2022-07-11 NOTE — PROGRESS NOTES
[facility]  coding opportunities          Chart Reviewed number of suggestions sent to Provider: 7   [id number]  [id number]  I20 9  I77 9  [id number] 9  [id number]   [id number]    Patients Insurance     Medicare Insurance: [employer]

## 2022-07-18 ENCOUNTER — OFFICE VISIT (OUTPATIENT)
Dept: FAMILY MEDICINE CLINIC | Facility: CLINIC | Age: 82
End: 2022-07-18
Payer: COMMERCIAL

## 2022-07-18 VITALS
DIASTOLIC BLOOD PRESSURE: 64 MMHG | HEART RATE: 54 BPM | SYSTOLIC BLOOD PRESSURE: 136 MMHG | TEMPERATURE: 97.9 F | HEIGHT: 62 IN | BODY MASS INDEX: 33.88 KG/M2 | WEIGHT: 184.13 LBS | OXYGEN SATURATION: 98 % | RESPIRATION RATE: 16 BRPM

## 2022-07-18 DIAGNOSIS — E11.22 TYPE 2 DIABETES MELLITUS WITH STAGE 3B CHRONIC KIDNEY DISEASE, WITHOUT LONG-TERM CURRENT USE OF INSULIN (HCC): ICD-10-CM

## 2022-07-18 DIAGNOSIS — I10 ESSENTIAL HYPERTENSION: ICD-10-CM

## 2022-07-18 DIAGNOSIS — N18.32 TYPE 2 DIABETES MELLITUS WITH STAGE 3B CHRONIC KIDNEY DISEASE, WITHOUT LONG-TERM CURRENT USE OF INSULIN (HCC): ICD-10-CM

## 2022-07-18 DIAGNOSIS — F41.9 ANXIETY: ICD-10-CM

## 2022-07-18 DIAGNOSIS — I25.10 ARTERIOSCLEROSIS OF CORONARY ARTERY: ICD-10-CM

## 2022-07-18 DIAGNOSIS — R05.9 COUGH: ICD-10-CM

## 2022-07-18 DIAGNOSIS — E11.3292 TYPE 2 DIABETES MELLITUS WITH LEFT EYE AFFECTED BY MILD NONPROLIFERATIVE RETINOPATHY WITHOUT MACULAR EDEMA, WITHOUT LONG-TERM CURRENT USE OF INSULIN (HCC): ICD-10-CM

## 2022-07-18 DIAGNOSIS — J01.00 ACUTE NON-RECURRENT MAXILLARY SINUSITIS: Primary | ICD-10-CM

## 2022-07-18 DIAGNOSIS — E66.09 CLASS 1 OBESITY DUE TO EXCESS CALORIES WITH SERIOUS COMORBIDITY AND BODY MASS INDEX (BMI) OF 33.0 TO 33.9 IN ADULT: ICD-10-CM

## 2022-07-18 DIAGNOSIS — Z00.00 HEALTHCARE MAINTENANCE: ICD-10-CM

## 2022-07-18 DIAGNOSIS — I25.119 ATHEROSCLEROSIS OF NATIVE CORONARY ARTERY OF NATIVE HEART WITH ANGINA PECTORIS (HCC): ICD-10-CM

## 2022-07-18 DIAGNOSIS — N18.32 STAGE 3B CHRONIC KIDNEY DISEASE (HCC): ICD-10-CM

## 2022-07-18 PROCEDURE — 3288F FALL RISK ASSESSMENT DOCD: CPT | Performed by: FAMILY MEDICINE

## 2022-07-18 PROCEDURE — 3075F SYST BP GE 130 - 139MM HG: CPT | Performed by: FAMILY MEDICINE

## 2022-07-18 PROCEDURE — 1170F FXNL STATUS ASSESSED: CPT | Performed by: FAMILY MEDICINE

## 2022-07-18 PROCEDURE — 99214 OFFICE O/P EST MOD 30 MIN: CPT | Performed by: FAMILY MEDICINE

## 2022-07-18 PROCEDURE — 1160F RVW MEDS BY RX/DR IN RCRD: CPT | Performed by: FAMILY MEDICINE

## 2022-07-18 PROCEDURE — G0439 PPPS, SUBSEQ VISIT: HCPCS | Performed by: FAMILY MEDICINE

## 2022-07-18 PROCEDURE — 1125F AMNT PAIN NOTED PAIN PRSNT: CPT | Performed by: FAMILY MEDICINE

## 2022-07-18 PROCEDURE — 3078F DIAST BP <80 MM HG: CPT | Performed by: FAMILY MEDICINE

## 2022-07-18 RX ORDER — PREDNISONE 50 MG/1
50 TABLET ORAL DAILY
Qty: 5 TABLET | Refills: 0 | Status: SHIPPED | OUTPATIENT
Start: 2022-07-18 | End: 2022-07-23

## 2022-07-18 RX ORDER — GUAIFENESIN AND CODEINE PHOSPHATE 100; 10 MG/5ML; MG/5ML
5 SOLUTION ORAL 4 TIMES DAILY PRN
Qty: 180 ML | Refills: 0 | Status: SHIPPED | OUTPATIENT
Start: 2022-07-18 | End: 2022-09-30 | Stop reason: ALTCHOICE

## 2022-07-18 NOTE — PROGRESS NOTES
Assessment and Plan:     Problem List Items Addressed This Visit        Endocrine    Type 2 diabetes mellitus with left eye affected by mild nonproliferative retinopathy without macular edema, without long-term current use of insulin ([facility])       Lab Results   Component Value Date    HGBA1C 5 3 03/14/2022   A1c well controlled  Continue same  Will continue to monitor  Type 2 diabetes mellitus with chronic kidney disease ([facility] [address] )       Lab Results   Component Value Date    HGBA1C 5 3 03/14/2022   A1c is well controlled  GFR is slightly decreased but stable  Continue to follow low carb diet and continue to encourage oral hydration for continue follow-up with nephrologist          Relevant Orders    CBC and differential    Comprehensive metabolic panel    Hemoglobin A1C    Lipid Panel with Direct LDL reflex    TSH, 3rd generation with Free T4 reflex       Respiratory    Acute non-recurrent maxillary sinusitis - Primary    Relevant Medications    guaifenesin-codeine (GUAIFENESIN AC) 100-10 MG/5ML liquid    predniSONE 50 mg tablet       Cardiovascular and Mediastinum    Arteriosclerosis of coronary artery     Stable  Asymptomatic  Continue same  Continue follow-up with Cardiology  Essential hypertension     Well controlled  Continue same  Will continue to monitor  Atherosclerosis of native coronary artery of native heart with angina pectoris [facility])       Genitourinary    Stage 3b chronic kidney disease [facility])     Lab Results   Component Value Date    EGFR 37 03/14/2022    EGFR 39 09/20/2021    EGFR 48 07/28/2021    CREATININE 1 34 (H) 03/14/2022    CREATININE 1 30 09/20/2021    CREATININE 1 10 07/28/2021   Stable  Continue encourage oral hydration  Continue follow-up with nephrologist             Other    Anxiety     Stable  Continue same  Will continue to monitor  Healthcare maintenance     It was discussed about immunizations, diet, exercise and safety measures           Cough She was given prescription for prednisone and cough medicine with codeine  Relevant Medications    guaifenesin-codeine (GUAIFENESIN AC) 100-10 MG/5ML liquid    Class 1 obesity due to excess calories with serious comorbidity and body mass index (BMI) of 33 0 to 33 9 in adult     Discussed about low carb diet and regular exercise  Continue to monitor  BMI Counseling: Body mass index is 33 68 kg/m²  The BMI is above normal  Nutrition recommendations include decreasing portion sizes, encouraging healthy choices of fruits and vegetables and decreasing fast food intake  Rationale for BMI follow-up plan is due to patient being overweight or obese  Falls Plan of Care: balance, strength, and gait training instructions were provided  Preventive health issues were discussed with patient, and age appropriate screening tests were ordered as noted in patient's After Visit Summary  Personalized health advice and appropriate referrals for health education or preventive services given if needed, as noted in patient's After Visit Summary  History of Present Illness:     Patient presents for a Medicare Wellness Visit    She is here today for follow-up multiple medical problems  She has been taking her medications  Denies any side effects  She had COVID 2 weeks ago and she has been having cough and feeling fatigue and tired  She stated all the other symptoms improved but the cough and fatigue has not improved  She denies any shortness of breath  Oxygen saturation stable  Her last blood work was in March showed slightly elevated triglyceride  Her A1c is well controlled  Her GFR was slightly decreased but stable  She continues to follow-up with nephrologist      Patient Care Team:  Luba Barker MD as PCP - General (Family Medicine)  Luba Barker MD as PCP - PCP-[facility] Attributed-Roster     Review of Systems:     Review of Systems   Constitutional: Positive for fatigue  Negative for activity change, chills and fever  HENT: Positive for congestion, postnasal drip, rhinorrhea and sinus pressure  Negative for trouble swallowing  Eyes: Negative for visual disturbance  Respiratory: Positive for cough  Negative for apnea and shortness of breath  Cardiovascular: Negative for chest pain, palpitations and leg swelling  Gastrointestinal: Negative for abdominal pain, constipation, diarrhea and vomiting  Endocrine: Negative for cold intolerance and heat intolerance  Genitourinary: Negative for difficulty urinating and dysuria  Musculoskeletal: Negative for gait problem  Skin: Negative for rash  Neurological: Negative for dizziness, tremors, seizures and headaches  Hematological: Negative for adenopathy  Psychiatric/Behavioral: Negative for behavioral problems          Problem List:     Patient Active Problem List   Diagnosis    Polycythemia vera ([place] [address] )    Other vitamin B12 deficiency anemias    Allergic rhinitis    Angina pectoris [facility]    Anxiety    Arteriosclerosis of coronary artery    Benign neoplasm of colon    Carotid stenosis    Cholecystitis    Colon polyp    Depression    Gastroesophageal reflux disease    Herpes zoster    Hyperlipidemia    Osteoarthritis    Morbid obesity ([facility] )    Lymphedema    Posterior vitreous detachment    Renal failure    Retinal detachment    Vitamin D deficiency    Hypertensive kidney disease with chronic kidney disease, stage 1-4 or unspecified chronic kidney disease    Skin infection    Psoriasis    Healthcare maintenance    PAOD (peripheral arterial occlusive disease) ([facility] )    Injury of right popliteal artery    Stage 3b chronic kidney disease (HCC)    Chronic pain of left ankle    Dermatitis    Major depressive disorder, single episode, mild ([facility])    Type 2 diabetes mellitus with left eye affected by mild nonproliferative retinopathy without macular edema, without long-term current use of insulin ([facility] )    Acute left-sided low back pain with left-sided sciatica    Type 2 diabetes mellitus with chronic kidney disease ([facility] )    Essential hypertension    Blister of scalp with infection    Unspecified atherosclerosis of native arteries of extremities, bilateral legs (HCC)    Peripheral vascular disease, unspecified (HCC)    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)    Age-related nuclear cataract, bilateral    Type 2 diabetes mellitus with diabetic peripheral angiopathy without gangrene ([facility] )    Cataracta    Pre-op examination    Status post popliteal-distal bypass surgery    Acute non-recurrent maxillary sinusitis    Cough    Class 1 obesity due to excess calories with serious comorbidity and body mass index (BMI) of 33 0 to 33 9 in adult    Atherosclerosis of native coronary artery of native heart with angina pectoris [facility])      Past Medical and Surgical History:     Past Medical History:   Diagnosis Date    CAD (coronary artery disease)      Past Surgical History:   Procedure Laterality Date    BREAST BIOPSY      CAROTID ENDARTARECTOMY Left     CHOLECYSTECTOMY       and     HYSTERECTOMY  1981    KNEE CARTILAGE SURGERY Right 2001    OOPHORECTOMY  2010    REPLACEMENT TOTAL KNEE        Family History:     Family History   Problem Relation Age of Onset    Heart disease Mother     Hypertension Mother     Diabetes type II Mother     Cancer Father     Cystic fibrosis Daughter     Breast cancer Maternal Aunt 48    Breast cancer Cousin 30      Social History:     Social History     Socioeconomic History    Marital status: /Civil Union     Spouse name: None    Number of children: None    Years of education: None    Highest education level: None   Occupational History    None   Tobacco Use    Smoking status: Former Smoker     Types: Cigarettes     Quit date:      Years since quittin 5    Smokeless tobacco: Never Used    Tobacco comment: no passive smoke exposure   Vaping Use    Vaping Use: Never used   Substance and Sexual Activity    Alcohol use: No    Drug use: No    Sexual activity: None   Other Topics Concern    None   Social History Narrative    None     Social Determinants of Health     Financial Resource Strain: Not on file   Food Insecurity: Not on file   Transportation Needs: Not on file   Physical Activity: Not on file   Stress: Not on file   Social Connections: Not on file   Intimate Partner Violence: Not on file   Housing Stability: Not on file      Medications and Allergies:     Current Outpatient Medications   Medication Sig Dispense Refill    amLODIPine (NORVASC) 5 mg tablet TAKE 1 TABLET BY MOUTH EVERY DAY 90 tablet 1    aspirin 81 mg chewable tablet Chew 81 mg daily      cholecalciferol (VITAMIN D3) 1,000 units tablet Take 1,000 Units by mouth daily      cyanocobalamin (VITAMIN B-12) 100 mcg tablet Take 1,000 mcg by mouth      doxazosin (CARDURA) 1 mg tablet       guaifenesin-codeine (GUAIFENESIN AC) 100-10 MG/5ML liquid Take 5 mL by mouth 4 (four) times a day as needed for cough 180 mL 0    hydrocortisone 2 5 % cream APPLY TO AFFECTED AREA ON THE STOMACH TWICE A DAY      hydroxyurea (HYDREA) 500 mg capsule TAKE 2 DAYS ON AND 1 DAY  capsule 3    metoprolol tartrate (LOPRESSOR) 50 mg tablet TAKE 1/2 TABLET BY MOUTH EVERY 12 HOURS 90 tablet 1    omeprazole (PriLOSEC) 20 mg delayed release capsule Take 1 capsule (20 mg total) by mouth daily 90 capsule 1    predniSONE 50 mg tablet Take 1 tablet (50 mg total) by mouth daily for 5 days 5 tablet 0    rosuvastatin (CRESTOR) 5 mg tablet TAKE 1 TABLET BY MOUTH EVERY DAY 90 tablet 1    sertraline (ZOLOFT) 50 mg tablet TAKE 1 TABLET BY MOUTH EVERY DAY 90 tablet 1     No current facility-administered medications for this visit       Allergies   Allergen Reactions    Oxycodone Diarrhea, Dizziness, GI Intolerance, Headache, Irritability and Tremor     Other reaction(s): Anxiety, Nausea and/or vomiting, Other (See Comments)  Dizziness      No Active Allergies       Immunizations:     Immunization History   Administered Date(s) Administered    COVID-19 PFIZER VACCINE 0 3 ML IM 03/05/2021, 03/26/2021, 12/14/2021    INFLUENZA 10/14/2015, 10/11/2016, 11/06/2017, 11/13/2018    Influenza Quadrivalent Preservative Free ID 11/20/2013, 10/09/2014, 10/14/2015, 11/09/2015, 10/23/2020    Influenza Split 12/06/2012, 11/20/2013, 10/09/2014    Influenza Split High Dose Preservative Free IM 10/14/2015, 11/09/2015, 10/11/2016, 11/06/2017, 11/13/2018, 11/02/2019, 10/23/2020, 11/18/2021    Influenza, high dose seasonal 0 7 mL 10/23/2020, 11/18/2021    Pneumococcal Conjugate 13-Valent 04/12/2016    Pneumococcal Polysaccharide PPV23 10/18/2002, 11/16/2007      Health Maintenance:         Topic Date Due    Hepatitis C Screening  Never done         Topic Date Due    COVID-19 Vaccine (4 - Booster for [employer] series) 04/14/2022    Influenza Vaccine (1) 09/01/2022      Medicare Screening Tests and Risk Assessments:     Antoinette Hooper is here for her Subsequent Wellness visit  Health Risk Assessment:   Patient rates overall health as fair  Patient feels that their physical health rating is slightly worse  Patient is satisfied with their life  Eyesight was rated as same  Hearing was rated as same  Patient feels that their emotional and mental health rating is same  Patients states they are never, rarely angry  Patient states they are often unusually tired/fatigued  Pain experienced in the last 7 days has been a lot  Patient's pain rating has been 9/10  Patient states that she has experienced weight loss or gain in last 6 months  Fall Risk Screening: In the past year, patient has experienced: no history of falling in past year      Urinary Incontinence Screening:   Patient has leaked urine accidently in the last six months       Home Safety:  Patient has trouble with stairs inside or outside of their home  Patient has working smoke alarms and has no working carbon monoxide detector  Home safety hazards include: none  Nutrition:   Current diet is Regular  Medications:   Patient is currently taking over-the-counter supplements  OTC medications include: see medication list  Patient is able to manage medications  Activities of Daily Living (ADLs)/Instrumental Activities of Daily Living (IADLs):   Walk and transfer into and out of bed and chair?: Yes  Dress and groom yourself?: Yes    Bathe or shower yourself?: Yes    Feed yourself? Yes  Do your laundry/housekeeping?: Yes  Manage your money, pay your bills and track your expenses?: Yes  Make your own meals?: Yes    Do your own shopping?: Yes    Previous Hospitalizations:   Any hospitalizations or ED visits within the last 12 months?: No      Advance Care Planning:   Living will: Yes    Durable POA for healthcare:  Yes    Advanced directive: Yes      Cognitive Screening:   Provider or family/friend/caregiver concerned regarding cognition?: No    PREVENTIVE SCREENINGS      Cardiovascular Screening:    General: Screening Not Indicated and History Lipid Disorder      Diabetes Screening:     General: Screening Not Indicated and History Diabetes      Colorectal Cancer Screening:     General: Screening Not Indicated      Breast Cancer Screening:     General: Screening Not Indicated      Cervical Cancer Screening:    General: Screening Not Indicated      Osteoporosis Screening:    General: Risks and Benefits Discussed    Due for: DXA Axial      Abdominal Aortic Aneurysm (AAA) Screening:        General: Screening Not Indicated      Lung Cancer Screening:     General: Screening Not Indicated      Hepatitis C Screening:    General: Risks and Benefits Discussed    Screening, Brief Intervention, and Referral to Treatment (SBIRT)    Screening  Typical number of drinks in a day: 0  Typical number of drinks in a week: 0  Interpretation: Low risk drinking behavior  Brief Intervention  Alcohol & drug use screenings were reviewed  No concerns regarding substance use disorder identified  No exam data present     Physical Exam:     /64 (BP Location: Left arm, Patient Position: Sitting, Cuff Size: Adult)   Pulse (!) 54   Temp 97 9 °F (36 6 °C) (Tympanic)   Resp 16   Ht 5' 2" (1 575 m)   Wt 83 5 kg (184 lb 2 oz)   LMP  (LMP Unknown)   SpO2 98%   BMI 33 68 kg/m²     Physical Exam  Vitals and nursing note reviewed  Constitutional:       General: She is not in acute distress  Appearance: Normal appearance  She is well-developed  HENT:      Head: Normocephalic and atraumatic  Mouth/Throat:      Pharynx: Posterior oropharyngeal erythema present  Eyes:      Conjunctiva/sclera: Conjunctivae normal    Cardiovascular:      Rate and Rhythm: Normal rate and regular rhythm  Heart sounds: No murmur heard  Pulmonary:      Effort: Pulmonary effort is normal  No respiratory distress  Breath sounds: Normal breath sounds  Abdominal:      Palpations: Abdomen is soft  Tenderness: There is no abdominal tenderness  Musculoskeletal:      Cervical back: Neck supple  Skin:     General: Skin is warm and dry  Neurological:      Mental Status: She is alert            Araceli Gomez MD

## 2022-07-19 PROBLEM — E66.09 CLASS 1 OBESITY DUE TO EXCESS CALORIES WITH SERIOUS COMORBIDITY AND BODY MASS INDEX (BMI) OF 33.0 TO 33.9 IN ADULT: Status: ACTIVE | Noted: 2022-07-19

## 2022-07-19 PROBLEM — E66.811 CLASS 1 OBESITY DUE TO EXCESS CALORIES WITH SERIOUS COMORBIDITY AND BODY MASS INDEX (BMI) OF 33.0 TO 33.9 IN ADULT: Status: ACTIVE | Noted: 2022-07-19

## 2022-07-19 PROBLEM — R05.9 COUGH: Status: ACTIVE | Noted: 2022-07-19

## 2022-07-19 PROBLEM — E66.01 MORBID (SEVERE) OBESITY DUE TO EXCESS CALORIES (HCC): Status: ACTIVE | Noted: 2022-07-19

## 2022-07-19 PROBLEM — I25.119 ATHEROSCLEROSIS OF NATIVE CORONARY ARTERY OF NATIVE HEART WITH ANGINA PECTORIS (HCC): Status: ACTIVE | Noted: 2022-07-19

## 2022-07-19 PROBLEM — J01.00 ACUTE NON-RECURRENT MAXILLARY SINUSITIS: Status: ACTIVE | Noted: 2022-07-19

## 2022-07-19 NOTE — ASSESSMENT & PLAN NOTE
Lab Results   Component Value Date    HGBA1C 5 3 03/14/2022   A1c is well controlled  GFR is slightly decreased but stable    Continue to follow low carb diet and continue to encourage oral hydration for continue follow-up with nephrologist

## 2022-07-19 NOTE — ASSESSMENT & PLAN NOTE
Lab Results   Component Value Date    HGBA1C 5 3 03/14/2022   A1c well controlled  Continue same  Will continue to monitor

## 2022-07-19 NOTE — ASSESSMENT & PLAN NOTE
Lab Results   Component Value Date    EGFR 37 03/14/2022    EGFR 39 09/20/2021    EGFR 48 07/28/2021    CREATININE 1 34 (H) 03/14/2022    CREATININE 1 30 09/20/2021    CREATININE 1 10 07/28/2021   Stable  Continue encourage oral hydration    Continue follow-up with nephrologist

## 2022-08-22 ENCOUNTER — TELEPHONE (OUTPATIENT)
Dept: HEMATOLOGY ONCOLOGY | Facility: CLINIC | Age: 82
End: 2022-08-22

## 2022-09-13 ENCOUNTER — HOSPITAL ENCOUNTER (OUTPATIENT)
Dept: NON INVASIVE DIAGNOSTICS | Facility: CLINIC | Age: 82
Discharge: HOME | End: 2022-09-13
Payer: COMMERCIAL

## 2022-09-13 DIAGNOSIS — Z98.890: ICD-10-CM

## 2022-09-13 PROCEDURE — 93923 UPR/LXTR ART STDY 3+ LVLS: CPT

## 2022-09-13 PROCEDURE — 93925 LOWER EXTREMITY STUDY: CPT

## 2022-09-15 ENCOUNTER — APPOINTMENT (OUTPATIENT)
Dept: LAB | Facility: IMAGING CENTER | Age: 82
End: 2022-09-15
Payer: COMMERCIAL

## 2022-09-15 DIAGNOSIS — N18.32 STAGE 3B CHRONIC KIDNEY DISEASE (HCC): ICD-10-CM

## 2022-09-15 DIAGNOSIS — D45 PV (POLYCYTHEMIA VERA) (HCC): ICD-10-CM

## 2022-09-15 LAB
ALBUMIN SERPL BCP-MCNC: 3.4 G/DL (ref 3.5–5)
ALP SERPL-CCNC: 74 U/L (ref 46–116)
ALT SERPL W P-5'-P-CCNC: 22 U/L (ref 12–78)
ANION GAP SERPL CALCULATED.3IONS-SCNC: 1 MMOL/L (ref 4–13)
AST SERPL W P-5'-P-CCNC: 20 U/L (ref 5–45)
BASOPHILS # BLD AUTO: 0.04 THOUSANDS/ΜL (ref 0–0.1)
BASOPHILS NFR BLD AUTO: 1 % (ref 0–1)
BILIRUB SERPL-MCNC: 0.51 MG/DL (ref 0.2–1)
BUN SERPL-MCNC: 22 MG/DL (ref 5–25)
CALCIUM ALBUM COR SERPL-MCNC: 9.4 MG/DL (ref 8.3–10.1)
CALCIUM SERPL-MCNC: 8.9 MG/DL (ref 8.3–10.1)
CHLORIDE SERPL-SCNC: 111 MMOL/L (ref 96–108)
CO2 SERPL-SCNC: 30 MMOL/L (ref 21–32)
CREAT SERPL-MCNC: 1.31 MG/DL (ref 0.6–1.3)
EOSINOPHIL # BLD AUTO: 0.11 THOUSAND/ΜL (ref 0–0.61)
EOSINOPHIL NFR BLD AUTO: 2 % (ref 0–6)
ERYTHROCYTE [DISTWIDTH] IN BLOOD BY AUTOMATED COUNT: 14.8 % (ref 11.6–15.1)
GFR SERPL CREATININE-BSD FRML MDRD: 38 ML/MIN/1.73SQ M
GLUCOSE P FAST SERPL-MCNC: 106 MG/DL (ref 65–99)
HCT VFR BLD AUTO: 41 % (ref 34.8–46.1)
HGB BLD-MCNC: 13 G/DL (ref 11.5–15.4)
IMM GRANULOCYTES # BLD AUTO: 0.02 THOUSAND/UL (ref 0–0.2)
IMM GRANULOCYTES NFR BLD AUTO: 0 % (ref 0–2)
LDH SERPL-CCNC: 251 U/L (ref 81–234)
LYMPHOCYTES # BLD AUTO: 1.08 THOUSANDS/ΜL (ref 0.6–4.47)
LYMPHOCYTES NFR BLD AUTO: 21 % (ref 14–44)
MAGNESIUM SERPL-MCNC: 2.2 MG/DL (ref 1.6–2.6)
MCH RBC QN AUTO: 33.7 PG (ref 26.8–34.3)
MCHC RBC AUTO-ENTMCNC: 31.7 G/DL (ref 31.4–37.4)
MCV RBC AUTO: 106 FL (ref 82–98)
MONOCYTES # BLD AUTO: 0.25 THOUSAND/ΜL (ref 0.17–1.22)
MONOCYTES NFR BLD AUTO: 5 % (ref 4–12)
NEUTROPHILS # BLD AUTO: 3.78 THOUSANDS/ΜL (ref 1.85–7.62)
NEUTS SEG NFR BLD AUTO: 71 % (ref 43–75)
NRBC BLD AUTO-RTO: 0 /100 WBCS
PHOSPHATE SERPL-MCNC: 3.5 MG/DL (ref 2.3–4.1)
PLATELET # BLD AUTO: 144 THOUSANDS/UL (ref 149–390)
PMV BLD AUTO: 10.7 FL (ref 8.9–12.7)
POTASSIUM SERPL-SCNC: 4.6 MMOL/L (ref 3.5–5.3)
PROT SERPL-MCNC: 6.9 G/DL (ref 6.4–8.4)
PTH-INTACT SERPL-MCNC: 99.1 PG/ML (ref 18.4–80.1)
RBC # BLD AUTO: 3.86 MILLION/UL (ref 3.81–5.12)
SODIUM SERPL-SCNC: 142 MMOL/L (ref 135–147)
URATE SERPL-MCNC: 5.9 MG/DL (ref 2–7.5)
WBC # BLD AUTO: 5.28 THOUSAND/UL (ref 4.31–10.16)

## 2022-09-15 PROCEDURE — 85025 COMPLETE CBC W/AUTO DIFF WBC: CPT

## 2022-09-15 PROCEDURE — 93922 UPR/L XTREMITY ART 2 LEVELS: CPT | Performed by: SURGERY

## 2022-09-15 PROCEDURE — 83970 ASSAY OF PARATHORMONE: CPT

## 2022-09-15 PROCEDURE — 84100 ASSAY OF PHOSPHORUS: CPT

## 2022-09-15 PROCEDURE — 83735 ASSAY OF MAGNESIUM: CPT

## 2022-09-15 PROCEDURE — 83615 LACTATE (LD) (LDH) ENZYME: CPT

## 2022-09-15 PROCEDURE — 36415 COLL VENOUS BLD VENIPUNCTURE: CPT

## 2022-09-15 PROCEDURE — 80053 COMPREHEN METABOLIC PANEL: CPT

## 2022-09-15 PROCEDURE — 93925 LOWER EXTREMITY STUDY: CPT | Performed by: SURGERY

## 2022-09-15 PROCEDURE — 84550 ASSAY OF BLOOD/URIC ACID: CPT

## 2022-09-16 ENCOUNTER — TRANSCRIBE ORDERS (OUTPATIENT)
Dept: ADMINISTRATIVE | Facility: HOSPITAL | Age: 82
End: 2022-09-16

## 2022-09-16 DIAGNOSIS — I77.9 PAOD (PERIPHERAL ARTERIAL OCCLUSIVE DISEASE) (HCC): Primary | ICD-10-CM

## 2022-09-30 ENCOUNTER — OFFICE VISIT (OUTPATIENT)
Dept: HEMATOLOGY ONCOLOGY | Facility: CLINIC | Age: 82
End: 2022-09-30
Payer: COMMERCIAL

## 2022-09-30 VITALS
SYSTOLIC BLOOD PRESSURE: 140 MMHG | TEMPERATURE: 96.9 F | BODY MASS INDEX: 34.04 KG/M2 | WEIGHT: 185 LBS | DIASTOLIC BLOOD PRESSURE: 70 MMHG | OXYGEN SATURATION: 95 % | RESPIRATION RATE: 16 BRPM | HEIGHT: 62 IN | HEART RATE: 55 BPM

## 2022-09-30 DIAGNOSIS — D45 PV (POLYCYTHEMIA VERA) (HCC): Primary | ICD-10-CM

## 2022-09-30 DIAGNOSIS — D51.8 OTHER VITAMIN B12 DEFICIENCY ANEMIAS: ICD-10-CM

## 2022-09-30 PROCEDURE — 99214 OFFICE O/P EST MOD 30 MIN: CPT | Performed by: NURSE PRACTITIONER

## 2022-09-30 RX ORDER — HYDROXYUREA 500 MG/1
CAPSULE ORAL
Qty: 180 CAPSULE | Refills: 3 | Status: SHIPPED | OUTPATIENT
Start: 2022-09-30

## 2022-09-30 NOTE — PROGRESS NOTES
Hematology/Oncology Outpatient Follow-up  Krystin Covington [de-identified] y o  female 1940 [id number]    Date:  9/30/2022      Assessment and Plan:  1  PV (polycythemia vera) ([facility] )  Patient is tolerating her current dose of hydroxyurea 500 mg 2 days on and 1 day off which seems to be maintaining her hematocrit levels around 45%    We will continue her on the current dose without any changes  Encouraged her to continue her baby aspirin daily   Her platelet count is just slightly below average 144 which be due to the hydroxyurea; will continue to monitor  Request she follow up again with repeat labs in 6 months or sooner should the need arise     - CBC and differential; Future  - Comprehensive metabolic panel; Future  - LD,Blood; Future  - C-reactive protein; Future  - Sedimentation rate, automated; Future  - hydroxyurea (HYDREA) 500 mg capsule; Take pills 2 days on 1 day off  Dispense: 180 capsule; Refill: 3    2  Other vitamin B12 deficiency anemias  Patient is taking oral supplements  Will repeat her vitamin B12 before her next office visit  - Vitamin B12; Future    HPI:  Patient presents today for a follow-up visit  She continues to take her hydroxyurea 500 mg 2 days on with 1 week of a break which she is tolerating fairly well  She states that she continues to have some occasional itching from her hydroxyurea which has been ongoing since initiation  Her symptom is mild unusually improves Benadryl; she is only using the Benadryl on occasion when symptom is bothersome  Otherwise she is doing well and has no new complaints  States that she is seeing vascular surgery who is monitoring her peripheral vascular disease and recently had follow-up ultrasounds  Her most recent laboratory studies from 09/15/2022 showed normal WBC 5 28, she is not anemic her H&H is at goal 13 0/41, MCV is elevated as expected 106 with platelet count slightly below average 144    Kidney function is stable BUN 22, creatinine 1 31, GFR 38, albumin 3 4 glucose 106 remaining metabolic panel is appropriate  LDH is mildly elevated 251, uric acid normal     Oncology History Overview Note   Patient had extensive workup for further evaluation of her elevated H&H  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, which is diagnostic for polycythemia vera  She was started on Hydrea 500 mg once a day February 10, 2016  The dose was increased to 500 mg twice a day which resulted in significant drop of her platelet count  We did then decrease the dose and put on hold April 4, 2016  Patient had 2 phlebotomies while she was off the Hydrea  She is currently on Hydrea 500 mg once a day 2 days on and 1 day off and tolerating this dose well  Polycythemia vera ([facility] [address] )   2/3/2016 Initial Diagnosis    PV (polycythemia vera) ([facility])         Interval history:    ROS: Review of Systems   Constitutional: Positive for fatigue (mild)  Negative for activity change, appetite change, chills, fever and unexpected weight change  HENT: Positive for hearing loss  Negative for congestion, mouth sores, nosebleeds, sore throat and trouble swallowing  Eyes: Negative  Respiratory: Negative for cough, chest tightness and shortness of breath  Cardiovascular: Positive for leg swelling (chronic)  Negative for chest pain and palpitations  Gastrointestinal: Negative for abdominal distention, abdominal pain, blood in stool, constipation, diarrhea, nausea and vomiting  Genitourinary: Negative for difficulty urinating, dysuria, frequency, hematuria and urgency  Musculoskeletal: Positive for arthralgias and myalgias  Negative for back pain, gait problem and joint swelling  Skin: Positive for color change and rash  Negative for pallor  Neurological: Positive for numbness  Negative for dizziness, weakness, light-headedness and headaches  Hematological: Negative for adenopathy  Psychiatric/Behavioral: Positive for dysphoric mood   Negative for sleep disturbance  Past Medical History:   Diagnosis Date    CAD (coronary artery disease)        Past Surgical History:   Procedure Laterality Date    BREAST BIOPSY      CAROTID ENDARTARECTOMY Left     CHOLECYSTECTOMY       and 2012    HYSTERECTOMY  1981    KNEE CARTILAGE SURGERY Right 2001    OOPHORECTOMY  2010    REPLACEMENT TOTAL KNEE  2001       Social History     Socioeconomic History    Marital status: /Civil Union     Spouse name: None    Number of children: None    Years of education: None    Highest education level: None   Occupational History    None   Tobacco Use    Smoking status: Former Smoker     Types: Cigarettes     Quit date:      Years since quittin 7    Smokeless tobacco: Never Used    Tobacco comment: no passive smoke exposure   Vaping Use    Vaping Use: Never used   Substance and Sexual Activity    Alcohol use: No    Drug use: No    Sexual activity: None   Other Topics Concern    None   Social History Narrative    None     Social Determinants of Health     Financial Resource Strain: Not on file   Food Insecurity: Not on file   Transportation Needs: Not on file   Physical Activity: Not on file   Stress: Not on file   Social Connections: Not on file   Intimate Partner Violence: Not on file   Housing Stability: Not on file       Family History   Problem Relation Age of Onset    Heart disease Mother     Hypertension Mother     Diabetes type II Mother     Cancer Father     Cystic fibrosis Daughter     Breast cancer Maternal Aunt 48    Breast cancer Cousin 30       Allergies   Allergen Reactions    Oxycodone Diarrhea, Dizziness, GI Intolerance, Headache, Irritability and Tremor     Other reaction(s):  Anxiety, Nausea and/or vomiting, Other (See Comments)  Dizziness      No Active Allergies          Current Outpatient Medications:     aspirin 81 mg chewable tablet, Chew 81 mg daily, Disp: , Rfl:     cholecalciferol (VITAMIN D3) 1,000 units tablet, Take 1,000 Units by mouth daily, Disp: , Rfl:     cyanocobalamin (VITAMIN B-12) 100 mcg tablet, Take 1,000 mcg by mouth, Disp: , Rfl:     doxazosin (CARDURA) 1 mg tablet, , Disp: , Rfl:     hydrocortisone 2 5 % cream, APPLY TO AFFECTED AREA ON THE STOMACH TWICE A DAY, Disp: , Rfl:     hydroxyurea (HYDREA) 500 mg capsule, Take pills 2 days on 1 day off, Disp: 180 capsule, Rfl: 3    metoprolol tartrate (LOPRESSOR) 50 mg tablet, TAKE 1/2 TABLET BY MOUTH EVERY 12 HOURS, Disp: 90 tablet, Rfl: 1    omeprazole (PriLOSEC) 20 mg delayed release capsule, Take 1 capsule (20 mg total) by mouth daily, Disp: 90 capsule, Rfl: 1    rosuvastatin (CRESTOR) 5 mg tablet, TAKE 1 TABLET BY MOUTH EVERY DAY, Disp: 90 tablet, Rfl: 1    sertraline (ZOLOFT) 50 mg tablet, TAKE 1 TABLET BY MOUTH EVERY DAY, Disp: 90 tablet, Rfl: 1      Physical Exam:  /70 (BP Location: Left arm, Patient Position: Sitting, Cuff Size: Adult)   Pulse 55   Temp (!) 96 9 °F (36 1 °C) (Tympanic)   Resp 16   Ht 5' 2" (1 575 m)   Wt 83 9 kg (185 lb)   LMP  (LMP Unknown)   SpO2 95%   BMI 33 84 kg/m²     Physical Exam  Vitals reviewed  Constitutional:       General: She is not in acute distress  Appearance: She is well-developed  She is not diaphoretic  HENT:      Head: Normocephalic and atraumatic  Right Ear: Decreased hearing noted  Left Ear: Decreased hearing noted  Eyes:      General: No scleral icterus  Conjunctiva/sclera: Conjunctivae normal       Pupils: Pupils are equal, round, and reactive to light  Neck:      Thyroid: No thyromegaly  Cardiovascular:      Rate and Rhythm: Normal rate and regular rhythm  Heart sounds: Normal heart sounds  No murmur heard  Pulmonary:      Effort: Pulmonary effort is normal  No respiratory distress  Breath sounds: Normal breath sounds  Chest:   Breasts:      Right: No axillary adenopathy  Left: No axillary adenopathy         Abdominal:      General: There is no distension  Palpations: Abdomen is soft  There is no hepatomegaly or splenomegaly  Tenderness: There is no abdominal tenderness  Musculoskeletal:         General: Normal range of motion  Cervical back: Normal range of motion and neck supple  Right lower leg: Edema (slightly increased on RLE which is chronic per pt) present  Left lower leg: Edema present  Lymphadenopathy:      Cervical: No cervical adenopathy  Upper Body:      Right upper body: No axillary adenopathy  Left upper body: No axillary adenopathy  Skin:     General: Skin is warm and dry  Findings: No erythema or rash  Neurological:      General: No focal deficit present  Mental Status: She is alert and oriented to person, place, and time  Psychiatric:         Mood and Affect: Mood and affect normal          Behavior: Behavior normal  Behavior is cooperative  Thought Content: Thought content normal          Judgment: Judgment normal            Labs:  Lab Results   Component Value Date    WBC 5 28 09/15/2022    HGB 13 0 09/15/2022    HCT 41 0 09/15/2022     (H) 09/15/2022     (L) 09/15/2022     Lab Results   Component Value Date    K 4 6 09/15/2022     (H) 09/15/2022    CO2 30 09/15/2022    BUN 22 09/15/2022    CREATININE 1 31 (H) 09/15/2022    GLUF 106 (H) 09/15/2022    CALCIUM 8 9 09/15/2022    CORRECTEDCA 9 4 09/15/2022    AST 20 09/15/2022    ALT 22 09/15/2022    ALKPHOS 74 09/15/2022    EGFR 38 09/15/2022       Patient voiced understanding and agreement in the above discussion  Aware to contact our office with questions/symptoms in the interim  This note has been generated by voice recognition software system  Therefore, there may be spelling, grammar, and or syntax errors  Please contact if questions arise

## 2022-10-05 ENCOUNTER — APPOINTMENT (OUTPATIENT)
Dept: LAB | Facility: IMAGING CENTER | Age: 82
End: 2022-10-05
Payer: COMMERCIAL

## 2022-10-05 DIAGNOSIS — N18.32 TYPE 2 DIABETES MELLITUS WITH STAGE 3B CHRONIC KIDNEY DISEASE, WITHOUT LONG-TERM CURRENT USE OF INSULIN (HCC): ICD-10-CM

## 2022-10-05 DIAGNOSIS — E11.22 TYPE 2 DIABETES MELLITUS WITH STAGE 3B CHRONIC KIDNEY DISEASE, WITHOUT LONG-TERM CURRENT USE OF INSULIN (HCC): ICD-10-CM

## 2022-10-05 LAB
ALBUMIN SERPL BCP-MCNC: 3.6 G/DL (ref 3.5–5)
ALP SERPL-CCNC: 73 U/L (ref 46–116)
ALT SERPL W P-5'-P-CCNC: 23 U/L (ref 12–78)
ANION GAP SERPL CALCULATED.3IONS-SCNC: 3 MMOL/L (ref 4–13)
AST SERPL W P-5'-P-CCNC: 22 U/L (ref 5–45)
BASOPHILS # BLD AUTO: 0.04 THOUSANDS/ΜL (ref 0–0.1)
BASOPHILS NFR BLD AUTO: 1 % (ref 0–1)
BILIRUB SERPL-MCNC: 0.35 MG/DL (ref 0.2–1)
BUN SERPL-MCNC: 25 MG/DL (ref 5–25)
CALCIUM SERPL-MCNC: 9.2 MG/DL (ref 8.3–10.1)
CHLORIDE SERPL-SCNC: 111 MMOL/L (ref 96–108)
CHOLEST SERPL-MCNC: 131 MG/DL
CO2 SERPL-SCNC: 27 MMOL/L (ref 21–32)
CREAT SERPL-MCNC: 1.4 MG/DL (ref 0.6–1.3)
EOSINOPHIL # BLD AUTO: 0.18 THOUSAND/ΜL (ref 0–0.61)
EOSINOPHIL NFR BLD AUTO: 4 % (ref 0–6)
ERYTHROCYTE [DISTWIDTH] IN BLOOD BY AUTOMATED COUNT: 14.5 % (ref 11.6–15.1)
GFR SERPL CREATININE-BSD FRML MDRD: 35 ML/MIN/1.73SQ M
GLUCOSE P FAST SERPL-MCNC: 108 MG/DL (ref 65–99)
HCT VFR BLD AUTO: 39.9 % (ref 34.8–46.1)
HDLC SERPL-MCNC: 53 MG/DL
HGB BLD-MCNC: 12.9 G/DL (ref 11.5–15.4)
IMM GRANULOCYTES # BLD AUTO: 0.02 THOUSAND/UL (ref 0–0.2)
IMM GRANULOCYTES NFR BLD AUTO: 0 % (ref 0–2)
LDLC SERPL CALC-MCNC: 50 MG/DL (ref 0–100)
LYMPHOCYTES # BLD AUTO: 1.14 THOUSANDS/ΜL (ref 0.6–4.47)
LYMPHOCYTES NFR BLD AUTO: 22 % (ref 14–44)
MCH RBC QN AUTO: 33.9 PG (ref 26.8–34.3)
MCHC RBC AUTO-ENTMCNC: 32.3 G/DL (ref 31.4–37.4)
MCV RBC AUTO: 105 FL (ref 82–98)
MONOCYTES # BLD AUTO: 0.25 THOUSAND/ΜL (ref 0.17–1.22)
MONOCYTES NFR BLD AUTO: 5 % (ref 4–12)
NEUTROPHILS # BLD AUTO: 3.46 THOUSANDS/ΜL (ref 1.85–7.62)
NEUTS SEG NFR BLD AUTO: 68 % (ref 43–75)
NRBC BLD AUTO-RTO: 0 /100 WBCS
PLATELET # BLD AUTO: 140 THOUSANDS/UL (ref 149–390)
PMV BLD AUTO: 10.7 FL (ref 8.9–12.7)
POTASSIUM SERPL-SCNC: 4.3 MMOL/L (ref 3.5–5.3)
PROT SERPL-MCNC: 6.9 G/DL (ref 6.4–8.4)
RBC # BLD AUTO: 3.81 MILLION/UL (ref 3.81–5.12)
SODIUM SERPL-SCNC: 141 MMOL/L (ref 135–147)
TRIGL SERPL-MCNC: 138 MG/DL
TSH SERPL DL<=0.05 MIU/L-ACNC: 1.79 UIU/ML (ref 0.45–4.5)
WBC # BLD AUTO: 5.09 THOUSAND/UL (ref 4.31–10.16)

## 2022-10-05 PROCEDURE — 80053 COMPREHEN METABOLIC PANEL: CPT

## 2022-10-05 PROCEDURE — 85025 COMPLETE CBC W/AUTO DIFF WBC: CPT

## 2022-10-05 PROCEDURE — 80061 LIPID PANEL: CPT

## 2022-10-05 PROCEDURE — 83036 HEMOGLOBIN GLYCOSYLATED A1C: CPT

## 2022-10-05 PROCEDURE — 84443 ASSAY THYROID STIM HORMONE: CPT

## 2022-10-05 PROCEDURE — 36415 COLL VENOUS BLD VENIPUNCTURE: CPT

## 2022-10-06 LAB
EST. AVERAGE GLUCOSE BLD GHB EST-MCNC: 111 MG/DL
HBA1C MFR BLD: 5.5 %

## 2022-10-11 ENCOUNTER — RA CDI HCC (OUTPATIENT)
Dept: OTHER | Facility: HOSPITAL | Age: 82
End: 2022-10-11

## 2022-10-11 PROBLEM — R05.9 COUGH: Status: RESOLVED | Noted: 2022-07-19 | Resolved: 2022-10-11

## 2022-10-11 PROBLEM — J01.00 ACUTE NON-RECURRENT MAXILLARY SINUSITIS: Status: RESOLVED | Noted: 2022-07-19 | Resolved: 2022-10-11

## 2022-10-11 NOTE — PROGRESS NOTES
[facility]  coding opportunities          Chart Reviewed number of suggestions sent to Provider: 6   [id number]  E11 [id number]  I20 9  I77 9  I73 9  [phone]  Patients Insurance     Medicare Insurance: [employer]

## 2022-10-18 ENCOUNTER — OFFICE VISIT (OUTPATIENT)
Dept: FAMILY MEDICINE CLINIC | Facility: CLINIC | Age: 82
End: 2022-10-18
Payer: COMMERCIAL

## 2022-10-18 VITALS
SYSTOLIC BLOOD PRESSURE: 118 MMHG | DIASTOLIC BLOOD PRESSURE: 70 MMHG | TEMPERATURE: 97.5 F | OXYGEN SATURATION: 97 % | RESPIRATION RATE: 14 BRPM | HEART RATE: 50 BPM | HEIGHT: 62 IN | WEIGHT: 187.2 LBS | BODY MASS INDEX: 34.45 KG/M2

## 2022-10-18 DIAGNOSIS — I10 ESSENTIAL HYPERTENSION: ICD-10-CM

## 2022-10-18 DIAGNOSIS — E11.36 TYPE 2 DIABETES MELLITUS WITH DIABETIC CATARACT, WITHOUT LONG-TERM CURRENT USE OF INSULIN (HCC): Primary | ICD-10-CM

## 2022-10-18 DIAGNOSIS — F32.0 MAJOR DEPRESSIVE DISORDER, SINGLE EPISODE, MILD (HCC): ICD-10-CM

## 2022-10-18 DIAGNOSIS — K21.9 GASTROESOPHAGEAL REFLUX DISEASE WITHOUT ESOPHAGITIS: ICD-10-CM

## 2022-10-18 DIAGNOSIS — F32.89 OTHER DEPRESSION: ICD-10-CM

## 2022-10-18 DIAGNOSIS — N18.32 STAGE 3B CHRONIC KIDNEY DISEASE (HCC): ICD-10-CM

## 2022-10-18 DIAGNOSIS — E78.49 OTHER HYPERLIPIDEMIA: ICD-10-CM

## 2022-10-18 DIAGNOSIS — E66.01 MORBID (SEVERE) OBESITY DUE TO EXCESS CALORIES (HCC): ICD-10-CM

## 2022-10-18 PROCEDURE — 99214 OFFICE O/P EST MOD 30 MIN: CPT | Performed by: FAMILY MEDICINE

## 2022-10-18 NOTE — ASSESSMENT & PLAN NOTE
Stable  Continue same  Continue to follow low-fat diet  We will continue to monitor fasting lipid profile

## 2022-10-18 NOTE — ASSESSMENT & PLAN NOTE
Lab Results   Component Value Date    EGFR 35 10/05/2022    EGFR 38 09/15/2022    EGFR 37 03/14/2022    CREATININE 1 40 (H) 10/05/2022    CREATININE 1 31 (H) 09/15/2022    CREATININE 1 34 (H) 03/14/2022   Worsening  Continue to encourage oral hydration  Continue to monitor GFR    Follow up with nephrologist

## 2022-10-18 NOTE — PROGRESS NOTES
Name: Rudolph Reynolds      : [id number]      MRN: [id number]  Encounter Provider: Mitchel Hall MD  Encounter Date: 10/18/2022   Encounter department: [address] [place] [address]  Type 2 diabetes mellitus with diabetic cataract, without long-term current use of insulin [facility])  Assessment & Plan:    Lab Results   Component Value Date    HGBA1C 5 5 10/05/2022   Well-controlled  Continue same  We will continue to monitor  Orders:  -     CBC and differential; Future  -     Comprehensive metabolic panel; Future  -     Hemoglobin A1C; Future  -     Lipid Panel with Direct LDL reflex; Future  -     TSH, 3rd generation with Free T4 reflex; Future    2  Essential hypertension  Assessment & Plan:  Stable  Continue same  We will continue to monitor  3  Morbid (severe) obesity due to excess calories ([address] )    4  Gastroesophageal reflux disease without esophagitis  Assessment & Plan:  Stable  Continue same  We will continue to monitor  5  Other hyperlipidemia  Assessment & Plan:  Stable  Continue same  Continue to follow low-fat diet  We will continue to monitor fasting lipid profile  6  Other depression  Assessment & Plan:  Stable  Continue on sertraline  Continue to monitor  7  Major depressive disorder, single episode, mild (HCC)  Assessment & Plan:  Stable  Continue same  We will continue to monitor  8  Stage 3b chronic kidney disease [facility])  Assessment & Plan:  Lab Results   Component Value Date    EGFR 35 10/05/2022    EGFR 38 09/15/2022    EGFR 37 2022    CREATININE 1 40 (H) 10/05/2022    CREATININE 1 31 (H) 09/15/2022    CREATININE 1 34 (H) 2022   Worsening  Continue to encourage oral hydration  Continue to monitor GFR  Follow up with nephrologist           Falls Plan of Care: balance, strength, and gait training instructions were provided  Patient assessed for orthostatic hypotension   Medications that increase falls were reviewed  Urinary Incontinence Plan of Care: counseling topics discussed: use restroom every 2 hours and limit alcohol, caffeine, spicy foods, and acidic foods  Subjective     She is here today for follow-up multiple medical problems  She has been taking her medications  Denies side effects from her medications  She continues with some problems with peripheral edema  She had been following with nephrologist for chronic kidney disease  Review of Systems   Constitutional: Negative for chills and fever  HENT: Negative for trouble swallowing  Eyes: Negative for visual disturbance  Respiratory: Negative for cough and shortness of breath  Cardiovascular: Positive for leg swelling  Negative for chest pain and palpitations  Gastrointestinal: Negative for abdominal pain, constipation and diarrhea  Endocrine: Negative for cold intolerance and heat intolerance  Genitourinary: Negative for difficulty urinating and dysuria  Musculoskeletal: Negative for gait problem  Skin: Negative for rash  Neurological: Negative for dizziness, tremors, seizures and headaches  Hematological: Negative for adenopathy  Psychiatric/Behavioral: Negative for behavioral problems         Past Medical History:   Diagnosis Date   • CAD (coronary artery disease)      Past Surgical History:   Procedure Laterality Date   • BREAST BIOPSY     • CAROTID ENDARTARECTOMY Left    • CHOLECYSTECTOMY      2011 and 2012   • HYSTERECTOMY  1981   • KNEE CARTILAGE SURGERY Right 2001   • OOPHORECTOMY  2010   • REPLACEMENT TOTAL KNEE  2001     Family History   Problem Relation Age of Onset   • Heart disease Mother    • Hypertension Mother    • Diabetes type II Mother    • Cancer Father    • Cystic fibrosis Daughter    • Breast cancer Maternal Aunt 48   • Breast cancer Cousin 27     Social History     Socioeconomic History   • Marital status: /Civil Union     Spouse name: None   • Number of children: None   • Years of education: None   • Highest education level: None   Occupational History   • None   Tobacco Use   • Smoking status: Former Smoker     Types: Cigarettes     Quit date:      Years since quittin 8   • Smokeless tobacco: Never Used   • Tobacco comment: no passive smoke exposure   Vaping Use   • Vaping Use: Never used   Substance and Sexual Activity   • Alcohol use: No   • Drug use: No   • Sexual activity: None   Other Topics Concern   • None   Social History Narrative   • None     Social Determinants of Health     Financial Resource Strain: Not on file   Food Insecurity: Not on file   Transportation Needs: Not on file   Physical Activity: Not on file   Stress: Not on file   Social Connections: Not on file   Intimate Partner Violence: Not on file   Housing Stability: Not on file     Current Outpatient Medications on File Prior to Visit   Medication Sig   • aspirin 81 mg chewable tablet Chew 81 mg daily   • cholecalciferol (VITAMIN D3) 1,000 units tablet Take 1,000 Units by mouth daily   • cyanocobalamin (VITAMIN B-12) 100 mcg tablet Take 1,000 mcg by mouth   • doxazosin (CARDURA) 1 mg tablet    • hydrocortisone 2 5 % cream APPLY TO AFFECTED AREA ON THE STOMACH TWICE A DAY   • hydroxyurea (HYDREA) 500 mg capsule Take pills 2 days on 1 day off   • metoprolol tartrate (LOPRESSOR) 50 mg tablet TAKE 1/2 TABLET BY MOUTH EVERY 12 HOURS   • omeprazole (PriLOSEC) 20 mg delayed release capsule Take 1 capsule (20 mg total) by mouth daily   • rosuvastatin (CRESTOR) 5 mg tablet TAKE 1 TABLET BY MOUTH EVERY DAY   • sertraline (ZOLOFT) 50 mg tablet TAKE 1 TABLET BY MOUTH EVERY DAY     Allergies   Allergen Reactions   • Oxycodone Diarrhea, Dizziness, GI Intolerance, Headache, Irritability and Tremor     Other reaction(s):  Anxiety, Nausea and/or vomiting, Other (See Comments)  Dizziness     • No Active Allergies      Immunization History   Administered Date(s) Administered   • COVID-19 PFIZER VACCINE 0 3 ML IM 2021, 03/26/2021, 12/14/2021   • COVID-19, unspecified 03/25/2021   • INFLUENZA 10/14/2015, 10/11/2016, 11/06/2017, 11/13/2018   • Influenza Quadrivalent Preservative Free ID 11/20/2013, 10/09/2014, 10/14/2015, 11/09/2015, 10/23/2020   • Influenza Split 12/06/2012, 11/20/2013, 10/09/2014   • Influenza Split High Dose Preservative Free IM 10/14/2015, 11/09/2015, 10/11/2016, 11/06/2017, 11/13/2018, 11/02/2019, 10/23/2020, 11/18/2021   • Influenza, high dose seasonal 0 7 mL 10/23/2020, 11/18/2021   • Pneumococcal Conjugate 13-Valent 04/12/2016   • Pneumococcal Polysaccharide PPV23 10/18/2002, 11/16/2007       Objective     /70 (BP Location: Left arm, Patient Position: Sitting, Cuff Size: Standard)   Pulse (!) 50   Temp 97 5 °F (36 4 °C)   Resp 14   Ht 5' 2" (1 575 m)   Wt 84 9 kg (187 lb 3 2 oz)   LMP  (LMP Unknown)   SpO2 97%   BMI 34 24 kg/m²     Physical Exam  Vitals and nursing note reviewed  Constitutional:       Appearance: Normal appearance  She is well-developed  HENT:      Head: Normocephalic and atraumatic  Eyes:      Pupils: Pupils are equal, round, and reactive to light  Cardiovascular:      Rate and Rhythm: Normal rate and regular rhythm  Heart sounds: Normal heart sounds  Pulmonary:      Effort: Pulmonary effort is normal       Breath sounds: Normal breath sounds  Abdominal:      General: Bowel sounds are normal       Palpations: Abdomen is soft  Musculoskeletal:      Cervical back: Normal range of motion and neck supple  Right lower leg: Edema present  Left lower leg: Edema present  Lymphadenopathy:      Cervical: No cervical adenopathy  Skin:     General: Skin is warm  Neurological:      Mental Status: She is alert and oriented to person, place, and time  Cranial Nerves: No cranial nerve deficit         Vasu Haynes MD

## 2022-10-18 NOTE — ASSESSMENT & PLAN NOTE
Lab Results   Component Value Date    HGBA1C 5 5 10/05/2022   Well-controlled  Continue same  We will continue to monitor

## 2022-10-24 DIAGNOSIS — K21.9 GASTROESOPHAGEAL REFLUX DISEASE WITHOUT ESOPHAGITIS: ICD-10-CM

## 2022-10-24 RX ORDER — OMEPRAZOLE 20 MG/1
CAPSULE, DELAYED RELEASE ORAL
Qty: 90 CAPSULE | Refills: 1 | Status: SHIPPED | OUTPATIENT
Start: 2022-10-24

## 2022-11-26 DIAGNOSIS — E78.49 OTHER HYPERLIPIDEMIA: ICD-10-CM

## 2022-11-28 RX ORDER — ROSUVASTATIN CALCIUM 5 MG/1
TABLET, COATED ORAL
Qty: 90 TABLET | Refills: 1 | Status: SHIPPED | OUTPATIENT
Start: 2022-11-28

## 2022-11-28 NOTE — TELEPHONE ENCOUNTER
Pharmacy requesting refill on Crestor  Last seen 10/18/22  Has appt 2/21/23  Medication sent to pharmacy

## 2022-12-04 DIAGNOSIS — I10 ESSENTIAL HYPERTENSION: ICD-10-CM

## 2022-12-04 DIAGNOSIS — F32.0 CURRENT MILD EPISODE OF MAJOR DEPRESSIVE DISORDER, UNSPECIFIED WHETHER RECURRENT (HCC): ICD-10-CM

## 2022-12-05 RX ORDER — METOPROLOL TARTRATE 50 MG/1
TABLET, FILM COATED ORAL
Qty: 90 TABLET | Refills: 1 | Status: SHIPPED | OUTPATIENT
Start: 2022-12-05

## 2023-01-19 ENCOUNTER — TELEPHONE (OUTPATIENT)
Dept: FAMILY MEDICINE CLINIC | Facility: CLINIC | Age: 83
End: 2023-01-19

## 2023-01-20 ENCOUNTER — OFFICE VISIT (OUTPATIENT)
Dept: FAMILY MEDICINE CLINIC | Facility: CLINIC | Age: 83
End: 2023-01-20

## 2023-01-20 VITALS
OXYGEN SATURATION: 98 % | WEIGHT: 184 LBS | RESPIRATION RATE: 16 BRPM | HEIGHT: 62 IN | BODY MASS INDEX: 33.86 KG/M2 | TEMPERATURE: 97.2 F | HEART RATE: 59 BPM | SYSTOLIC BLOOD PRESSURE: 164 MMHG | DIASTOLIC BLOOD PRESSURE: 74 MMHG

## 2023-01-20 DIAGNOSIS — I10 ESSENTIAL HYPERTENSION: Primary | ICD-10-CM

## 2023-01-20 DIAGNOSIS — I73.89 OTHER SPECIFIED PERIPHERAL VASCULAR DISEASES (HCC): ICD-10-CM

## 2023-01-20 DIAGNOSIS — D45 PV (POLYCYTHEMIA VERA) (HCC): ICD-10-CM

## 2023-01-20 DIAGNOSIS — E11.36 TYPE 2 DIABETES MELLITUS WITH DIABETIC CATARACT, WITHOUT LONG-TERM CURRENT USE OF INSULIN (HCC): ICD-10-CM

## 2023-01-20 DIAGNOSIS — F32.0 MAJOR DEPRESSIVE DISORDER, SINGLE EPISODE, MILD (HCC): ICD-10-CM

## 2023-01-20 DIAGNOSIS — N18.32 STAGE 3B CHRONIC KIDNEY DISEASE (HCC): ICD-10-CM

## 2023-01-20 DIAGNOSIS — I25.119 ATHEROSCLEROSIS OF NATIVE CORONARY ARTERY OF NATIVE HEART WITH ANGINA PECTORIS (HCC): ICD-10-CM

## 2023-01-20 DIAGNOSIS — E66.01 MORBID (SEVERE) OBESITY DUE TO EXCESS CALORIES (HCC): ICD-10-CM

## 2023-01-20 RX ORDER — AMLODIPINE BESYLATE AND ATORVASTATIN CALCIUM 10; 10 MG/1; MG/1
5 TABLET, FILM COATED ORAL DAILY
COMMUNITY
End: 2023-01-20 | Stop reason: CLARIF

## 2023-01-20 RX ORDER — LOSARTAN POTASSIUM 50 MG/1
50 TABLET ORAL DAILY
Qty: 90 TABLET | Refills: 0 | Status: SHIPPED | OUTPATIENT
Start: 2023-01-20

## 2023-01-20 RX ORDER — AMLODIPINE BESYLATE 5 MG/1
5 TABLET ORAL DAILY
COMMUNITY

## 2023-01-20 NOTE — ASSESSMENT & PLAN NOTE
Lab Results   Component Value Date    EGFR 35 10/05/2022    EGFR 38 09/15/2022    EGFR 37 03/14/2022    CREATININE 1 40 (H) 10/05/2022    CREATININE 1 31 (H) 09/15/2022    CREATININE 1 34 (H) 03/14/2022   - Continue follow up with Nephrologist

## 2023-01-20 NOTE — ASSESSMENT & PLAN NOTE
- Not well controlled  - Continue Norvasc daily, metoprolol once daily  Will restart losartan  - Continue to monitor BP at home and record readings  Will follow up at next scheduled appt on 2/21  - If BP remains high despite restarting losartan, contact office

## 2023-01-20 NOTE — PROGRESS NOTES
Name: Santy Bernstein      : [id number]      MRN: [id number]  Encounter Provider: JULIO Richard  Encounter Date: 2023   Encounter department: [address] [place] [address]  Essential hypertension  Assessment & Plan:  - Not well controlled  - Continue Norvasc daily, metoprolol once daily  Will restart losartan  - Continue to monitor BP at home and record readings  Will follow up at next scheduled appt on   - If BP remains high despite restarting losartan, contact office  Orders:  -     losartan (COZAAR) 50 mg tablet; Take 1 tablet (50 mg total) by mouth daily    2  Major depressive disorder, single episode, mild (HCC)  Assessment & Plan:  - Well controlled on Zoloft daily  Continue same    - Will continue to monitor  3  Type 2 diabetes mellitus with diabetic cataract, without long-term current use of insulin ([place] [id number] )  Assessment & Plan:  - Well controlled on no current medication  Continue same    - Will continue to monitor  Lab Results   Component Value Date    HGBA1C 5 5 10/05/2022         4  Other specified peripheral vascular diseases ([place] [address] )  Assessment & Plan:  - s/p popliteal-distal bypass surgery  - Continue routine follow up with vascular surgeon  5  Morbid (severe) obesity due to excess calories [facility])  Assessment & Plan:  - Encouraged healthy diet and regular exercise  6  PV (polycythemia vera) ([place] [address] )  Assessment & Plan:  - Stable  - Continue follow up with Hematologist        7  Atherosclerosis of native coronary artery of native heart with angina pectoris ([place] [address] )  Assessment & Plan:  - Stable    - Continue current medication regimen and routine follow up with Cardiologist        8  Stage 3b chronic kidney disease [facility])  Assessment & Plan:  Lab Results   Component Value Date    EGFR 35 10/05/2022    EGFR 38 09/15/2022    EGFR 37 2022    CREATININE 1 40 (H) 10/05/2022    CREATININE 1 31 (H) 09/15/2022    CREATININE 1 34 (H) 03/14/2022   - Continue follow up with Nephrologist               Subjective     Patient presents today with complaints of elevated blood pressure  She states he was starting to feel dizzy and took her BP and it was high  Reports readings of 251-323T systolic and 46A diastolic  She is taking metoprolol once daily  She reports that her cancer doctor told her to stop her novasc due to leg swelling but this is a chronic issue for her  She realized that she never stopped taking it  She was previously on losartan 100 mg daily which she reports she is not taking  She does not remember who or why this medication was discontinued  Her records from her nephrologist back in September note it was still active on her medication list        Review of Systems   Constitutional: Negative for fatigue and fever  HENT: Negative for trouble swallowing  Eyes: Negative for visual disturbance  Respiratory: Negative for cough and shortness of breath  Cardiovascular: Negative for chest pain and palpitations  Gastrointestinal: Negative for abdominal pain and blood in stool  Endocrine: Negative for cold intolerance and heat intolerance  Genitourinary: Negative for difficulty urinating and dysuria  Musculoskeletal: Negative for gait problem  Skin: Negative for rash  Neurological: Negative for dizziness, syncope and headaches  Hematological: Negative for adenopathy  Psychiatric/Behavioral: Negative for behavioral problems         Past Medical History:   Diagnosis Date   • CAD (coronary artery disease)      Past Surgical History:   Procedure Laterality Date   • BREAST BIOPSY     • CAROTID ENDARTARECTOMY Left    • CHOLECYSTECTOMY      2011 and 2012   • HYSTERECTOMY  1981   • KNEE CARTILAGE SURGERY Right 2001   • OOPHORECTOMY  2010   • REPLACEMENT TOTAL KNEE  2001     Family History   Problem Relation Age of Onset   • Heart disease Mother    • Hypertension Mother    • Diabetes type II Mother    • Cancer Father    • Cystic fibrosis Daughter    • Breast cancer Maternal Aunt 48   • Breast cancer Cousin 27     Social History     Socioeconomic History   • Marital status: /Civil Union     Spouse name: None   • Number of children: None   • Years of education: None   • Highest education level: None   Occupational History   • None   Tobacco Use   • Smoking status: Former     Types: Cigarettes     Quit date:      Years since quittin 0   • Smokeless tobacco: Never   • Tobacco comments:     no passive smoke exposure   Vaping Use   • Vaping Use: Never used   Substance and Sexual Activity   • Alcohol use: No   • Drug use: No   • Sexual activity: None   Other Topics Concern   • None   Social History Narrative   • None     Social Determinants of Health     Financial Resource Strain: Not on file   Food Insecurity: Not on file   Transportation Needs: Not on file   Physical Activity: Not on file   Stress: Not on file   Social Connections: Not on file   Intimate Partner Violence: Not on file   Housing Stability: Not on file     Current Outpatient Medications on File Prior to Visit   Medication Sig   • amLODIPine (NORVASC) 5 mg tablet Take 5 mg by mouth daily   • aspirin 81 mg chewable tablet Chew 81 mg daily   • Cholecalciferol (Vitamin D3) 125 MCG (5000 UT) TABS Take by mouth daily   • cyanocobalamin (VITAMIN B-12) 100 mcg tablet Take 1,000 mcg by mouth   • doxazosin (CARDURA) 1 mg tablet    • hydrocortisone 2 5 % cream APPLY TO AFFECTED AREA ON THE STOMACH TWICE A DAY   • hydroxyurea (HYDREA) 500 mg capsule Take pills 2 days on 1 day off   • metoprolol tartrate (LOPRESSOR) 50 mg tablet TAKE 1/2 TABLET BY MOUTH EVERY 12 HOURS   • omeprazole (PriLOSEC) 20 mg delayed release capsule TAKE 1 CAPSULE BY MOUTH EVERY DAY   • sertraline (ZOLOFT) 50 mg tablet TAKE 1 TABLET BY MOUTH EVERY DAY   • [DISCONTINUED] amLODIPine-atorvastatin (CADUET) 10-10 MG per tablet Take 5 tablets by mouth daily   • rosuvastatin (CRESTOR) 5 mg tablet TAKE 1 TABLET BY MOUTH EVERY DAY (Patient not taking: Reported on 1/20/2023)     Allergies   Allergen Reactions   • Oxycodone Diarrhea, Dizziness, GI Intolerance, Headache, Irritability and Tremor     Other reaction(s): Anxiety, Nausea and/or vomiting, Other (See Comments)  Dizziness     • No Active Allergies      Immunization History   Administered Date(s) Administered   • COVID-19 PFIZER VACCINE 0 3 ML IM 03/05/2021, 03/26/2021, 12/14/2021   • COVID-19, unspecified 03/25/2021   • INFLUENZA 10/14/2015, 10/11/2016, 11/06/2017, 11/13/2018, 10/20/2022   • Influenza Quadrivalent Preservative Free ID 11/20/2013, 10/09/2014, 10/14/2015, 11/09/2015, 10/23/2020   • Influenza Split 12/06/2012, 11/20/2013, 10/09/2014   • Influenza Split High Dose Preservative Free IM 10/14/2015, 11/09/2015, 10/11/2016, 11/06/2017, 11/13/2018, 11/02/2019, 10/23/2020, 11/18/2021   • Influenza, Seasonal Vaccine, Quadrivalent, Adjuvanted,   5e 10/20/2022   • Influenza, high dose seasonal 0 7 mL 10/23/2020, 11/18/2021   • Pneumococcal Conjugate 13-Valent 04/12/2016   • Pneumococcal Polysaccharide PPV23 10/18/2002, 11/16/2007       Objective     /74 (BP Location: Left arm, Patient Position: Sitting, Cuff Size: Large)   Pulse 59   Temp (!) 97 2 °F (36 2 °C) (Tympanic)   Resp 16   Ht 5' 2" (1 575 m)   Wt 83 5 kg (184 lb)   LMP  (LMP Unknown)   SpO2 98%   BMI 33 65 kg/m²     Physical Exam  Vitals and nursing note reviewed  Constitutional:       Appearance: Normal appearance  HENT:      Head: Normocephalic and atraumatic  Right Ear: External ear normal       Left Ear: External ear normal    Eyes:      Conjunctiva/sclera: Conjunctivae normal    Cardiovascular:      Rate and Rhythm: Normal rate and regular rhythm  Heart sounds: Normal heart sounds  Pulmonary:      Effort: Pulmonary effort is normal       Breath sounds: Normal breath sounds  Musculoskeletal:         General: Normal range of motion        Cervical back: Normal range of motion  Skin:     General: Skin is warm and dry  Neurological:      Mental Status: She is alert and oriented to person, place, and time  Cranial Nerves: No cranial nerve deficit     Psychiatric:         Mood and Affect: Mood normal          Behavior: Behavior normal        JULIO Flower

## 2023-01-20 NOTE — ASSESSMENT & PLAN NOTE
- Well controlled on no current medication  Continue same    - Will continue to monitor     Lab Results   Component Value Date    HGBA1C 5 5 10/05/2022

## 2023-02-07 ENCOUNTER — APPOINTMENT (OUTPATIENT)
Dept: LAB | Facility: IMAGING CENTER | Age: 83
End: 2023-02-07

## 2023-02-07 DIAGNOSIS — E55.9 AVITAMINOSIS D: ICD-10-CM

## 2023-02-07 DIAGNOSIS — E11.36 TYPE 2 DIABETES MELLITUS WITH DIABETIC CATARACT, WITHOUT LONG-TERM CURRENT USE OF INSULIN (HCC): ICD-10-CM

## 2023-02-07 LAB
25(OH)D3 SERPL-MCNC: 50.4 NG/ML (ref 30–100)
ALBUMIN SERPL BCP-MCNC: 3.8 G/DL (ref 3.5–5)
ALP SERPL-CCNC: 73 U/L (ref 46–116)
ALT SERPL W P-5'-P-CCNC: 22 U/L (ref 12–78)
ANION GAP SERPL CALCULATED.3IONS-SCNC: 3 MMOL/L (ref 4–13)
AST SERPL W P-5'-P-CCNC: 19 U/L (ref 5–45)
BASOPHILS # BLD AUTO: 0.05 THOUSANDS/ÂΜL (ref 0–0.1)
BASOPHILS NFR BLD AUTO: 1 % (ref 0–1)
BILIRUB SERPL-MCNC: 0.61 MG/DL (ref 0.2–1)
BUN SERPL-MCNC: 26 MG/DL (ref 5–25)
CALCIUM SERPL-MCNC: 9.7 MG/DL (ref 8.3–10.1)
CHLORIDE SERPL-SCNC: 108 MMOL/L (ref 96–108)
CHOLEST SERPL-MCNC: 136 MG/DL
CO2 SERPL-SCNC: 31 MMOL/L (ref 21–32)
CREAT SERPL-MCNC: 1.32 MG/DL (ref 0.6–1.3)
EOSINOPHIL # BLD AUTO: 0.11 THOUSAND/ÂΜL (ref 0–0.61)
EOSINOPHIL NFR BLD AUTO: 2 % (ref 0–6)
ERYTHROCYTE [DISTWIDTH] IN BLOOD BY AUTOMATED COUNT: 15.1 % (ref 11.6–15.1)
GFR SERPL CREATININE-BSD FRML MDRD: 38 ML/MIN/1.73SQ M
GLUCOSE P FAST SERPL-MCNC: 102 MG/DL (ref 65–99)
HCT VFR BLD AUTO: 43.3 % (ref 34.8–46.1)
HDLC SERPL-MCNC: 57 MG/DL
HGB BLD-MCNC: 13.9 G/DL (ref 11.5–15.4)
IMM GRANULOCYTES # BLD AUTO: 0.01 THOUSAND/UL (ref 0–0.2)
IMM GRANULOCYTES NFR BLD AUTO: 0 % (ref 0–2)
LDLC SERPL CALC-MCNC: 51 MG/DL (ref 0–100)
LYMPHOCYTES # BLD AUTO: 1.06 THOUSANDS/ÂΜL (ref 0.6–4.47)
LYMPHOCYTES NFR BLD AUTO: 22 % (ref 14–44)
MCH RBC QN AUTO: 33.7 PG (ref 26.8–34.3)
MCHC RBC AUTO-ENTMCNC: 32.1 G/DL (ref 31.4–37.4)
MCV RBC AUTO: 105 FL (ref 82–98)
MONOCYTES # BLD AUTO: 0.23 THOUSAND/ÂΜL (ref 0.17–1.22)
MONOCYTES NFR BLD AUTO: 5 % (ref 4–12)
NEUTROPHILS # BLD AUTO: 3.48 THOUSANDS/ÂΜL (ref 1.85–7.62)
NEUTS SEG NFR BLD AUTO: 70 % (ref 43–75)
NRBC BLD AUTO-RTO: 0 /100 WBCS
PLATELET # BLD AUTO: 155 THOUSANDS/UL (ref 149–390)
PMV BLD AUTO: 10.6 FL (ref 8.9–12.7)
POTASSIUM SERPL-SCNC: 4.3 MMOL/L (ref 3.5–5.3)
PROT SERPL-MCNC: 7.1 G/DL (ref 6.4–8.4)
RBC # BLD AUTO: 4.13 MILLION/UL (ref 3.81–5.12)
SODIUM SERPL-SCNC: 142 MMOL/L (ref 135–147)
TRIGL SERPL-MCNC: 138 MG/DL
TSH SERPL DL<=0.05 MIU/L-ACNC: 1.87 UIU/ML (ref 0.45–4.5)
WBC # BLD AUTO: 4.94 THOUSAND/UL (ref 4.31–10.16)

## 2023-02-08 LAB
EST. AVERAGE GLUCOSE BLD GHB EST-MCNC: 105 MG/DL
HBA1C MFR BLD: 5.3 %

## 2023-02-14 ENCOUNTER — RA CDI HCC (OUTPATIENT)
Dept: OTHER | Facility: HOSPITAL | Age: 83
End: 2023-02-14

## 2023-02-14 NOTE — PROGRESS NOTES
[facility]  coding opportunities          Chart Reviewed number of suggestions sent to Provider: 5   e11 22  E11 36  E11 51  I20 9  I70 [phone]  This is a reminder to address ALL HCC (risk adjustment) codes as found on active problem list for 2023 as patient scores reset to zero SAMANTHA  Patients Insurance     Medicare Insurance: [facility] [employer]

## 2023-02-21 ENCOUNTER — OFFICE VISIT (OUTPATIENT)
Dept: FAMILY MEDICINE CLINIC | Facility: CLINIC | Age: 83
End: 2023-02-21

## 2023-02-21 VITALS
TEMPERATURE: 97.8 F | WEIGHT: 183 LBS | SYSTOLIC BLOOD PRESSURE: 138 MMHG | HEIGHT: 62 IN | DIASTOLIC BLOOD PRESSURE: 60 MMHG | OXYGEN SATURATION: 97 % | BODY MASS INDEX: 33.68 KG/M2 | HEART RATE: 52 BPM

## 2023-02-21 DIAGNOSIS — E11.36 TYPE 2 DIABETES MELLITUS WITH DIABETIC CATARACT, WITHOUT LONG-TERM CURRENT USE OF INSULIN (HCC): Primary | ICD-10-CM

## 2023-02-21 DIAGNOSIS — I10 ESSENTIAL HYPERTENSION: ICD-10-CM

## 2023-02-21 DIAGNOSIS — E11.22 TYPE 2 DIABETES MELLITUS WITH STAGE 3B CHRONIC KIDNEY DISEASE, WITHOUT LONG-TERM CURRENT USE OF INSULIN (HCC): ICD-10-CM

## 2023-02-21 DIAGNOSIS — F41.9 ANXIETY: ICD-10-CM

## 2023-02-21 DIAGNOSIS — F32.0 MAJOR DEPRESSIVE DISORDER, SINGLE EPISODE, MILD (HCC): ICD-10-CM

## 2023-02-21 DIAGNOSIS — E66.09 CLASS 1 OBESITY DUE TO EXCESS CALORIES WITH SERIOUS COMORBIDITY AND BODY MASS INDEX (BMI) OF 33.0 TO 33.9 IN ADULT: ICD-10-CM

## 2023-02-21 DIAGNOSIS — K21.9 GASTROESOPHAGEAL REFLUX DISEASE WITHOUT ESOPHAGITIS: ICD-10-CM

## 2023-02-21 DIAGNOSIS — N18.32 TYPE 2 DIABETES MELLITUS WITH STAGE 3B CHRONIC KIDNEY DISEASE, WITHOUT LONG-TERM CURRENT USE OF INSULIN (HCC): ICD-10-CM

## 2023-02-21 NOTE — ASSESSMENT & PLAN NOTE
Lab Results   Component Value Date    HGBA1C 5 3 02/07/2023   A1c is well controlled  GFR is low but stable  Continue to follow-up with nephrologist   Continue to monitor A1c and GFR

## 2023-02-21 NOTE — PROGRESS NOTES
Name: Hoda Ordonez      :       MRN: [id number]  Encounter Provider: Kirby Alvarez MD  Encounter Date: 2023   Encounter department: [address] [place] [address]  Type 2 diabetes mellitus with diabetic cataract, without long-term current use of insulin [facility])  Assessment & Plan:    Lab Results   Component Value Date    HGBA1C 5 3 2023   A1c is well controlled without medication  We will continue to monitor  Orders:  -     Hemoglobin A1C; Future; Expected date: 2023  -     Comprehensive metabolic panel; Future; Expected date: 2023  -     CBC and differential; Future; Expected date: 2023  -     Microalbumin / creatinine urine ratio; Future; Expected date: 2023  -     TSH, 3rd generation with Free T4 reflex; Future; Expected date: 2023  -     Lipid Panel with Direct LDL reflex; Future; Expected date: 2023    2  Type 2 diabetes mellitus with stage 3b chronic kidney disease, without long-term current use of insulin [facility])  Assessment & Plan:    Lab Results   Component Value Date    HGBA1C 5 3 2023   A1c is well controlled  GFR is low but stable  Continue to follow-up with nephrologist   Continue to monitor A1c and GFR  3  Gastroesophageal reflux disease without esophagitis  Assessment & Plan:  Stable  Continue on omeprazole  Continue to monitor  4  Essential hypertension  Assessment & Plan:  Blood pressure is well controlled  Continue on losartan and metoprolol  Continue to monitor  5  Class 1 obesity due to excess calories with serious comorbidity and body mass index (BMI) of 33 0 to 33 9 in adult  Assessment & Plan:  Discussed about low-carb diet  Continue to monitor  6  Anxiety  Assessment & Plan:  Stable on Zoloft  Continue same  We will continue to monitor  7  Major depressive disorder, single episode, mild (HCC)  Assessment & Plan:  Well-controlled on Zoloft  Continue same  We will continue to monitor  8  BMI 33 0-33 9,adult           Subjective     She is here today for follow-up multiple medical problems  She has been taking her medications  She was seen by nephrologist recently  Had a blood work done showed GFR is low but stable  Her A1c is well controlled  Her amlodipine was DC'd and she continues on losartan and metoprolol  Her blood pressure has been stable  Review of Systems   Constitutional: Negative for chills and fever  HENT: Negative for trouble swallowing  Eyes: Negative for visual disturbance  Respiratory: Negative for cough and shortness of breath  Cardiovascular: Negative for chest pain, palpitations and leg swelling  Gastrointestinal: Negative for abdominal pain, constipation and diarrhea  Endocrine: Negative for cold intolerance and heat intolerance  Genitourinary: Negative for difficulty urinating and dysuria  Musculoskeletal: Negative for gait problem  Skin: Negative for rash  Neurological: Negative for dizziness, tremors, seizures and headaches  Hematological: Negative for adenopathy  Psychiatric/Behavioral: Negative for behavioral problems         Past Medical History:   Diagnosis Date   • CAD (coronary artery disease)      Past Surgical History:   Procedure Laterality Date   • BREAST BIOPSY     • CAROTID ENDARTARECTOMY Left    • CHOLECYSTECTOMY      2011 and 2012   • HYSTERECTOMY  1981   • KNEE CARTILAGE SURGERY Right 2001   • OOPHORECTOMY  2010   • REPLACEMENT TOTAL KNEE  2001     Family History   Problem Relation Age of Onset   • Heart disease Mother    • Hypertension Mother    • Diabetes type II Mother    • Cancer Father    • Cystic fibrosis Daughter    • Breast cancer Maternal Aunt 48   • Breast cancer Cousin 27     Social History     Socioeconomic History   • Marital status: /Civil Union     Spouse name: None   • Number of children: None   • Years of education: None   • Highest education level: None Occupational History   • None   Tobacco Use   • Smoking status: Former     Types: Cigarettes     Quit date:      Years since quittin 1   • Smokeless tobacco: Never   • Tobacco comments:     no passive smoke exposure   Vaping Use   • Vaping Use: Never used   Substance and Sexual Activity   • Alcohol use: No   • Drug use: No   • Sexual activity: None   Other Topics Concern   • None   Social History Narrative   • None     Social Determinants of Health     Financial Resource Strain: Not on file   Food Insecurity: Not on file   Transportation Needs: Not on file   Physical Activity: Not on file   Stress: Not on file   Social Connections: Not on file   Intimate Partner Violence: Not on file   Housing Stability: Not on file     Current Outpatient Medications on File Prior to Visit   Medication Sig   • aspirin 81 mg chewable tablet Chew 81 mg daily   • Cholecalciferol (Vitamin D3) 125 MCG (5000 UT) TABS Take by mouth daily   • cyanocobalamin (VITAMIN B-12) 100 mcg tablet Take 1,000 mcg by mouth   • doxazosin (CARDURA) 1 mg tablet    • hydrocortisone 2 5 % cream APPLY TO AFFECTED AREA ON THE STOMACH TWICE A DAY   • hydroxyurea (HYDREA) 500 mg capsule Take pills 2 days on 1 day off   • losartan (COZAAR) 50 mg tablet Take 1 tablet (50 mg total) by mouth daily   • metoprolol tartrate (LOPRESSOR) 50 mg tablet TAKE 1/2 TABLET BY MOUTH EVERY 12 HOURS   • omeprazole (PriLOSEC) 20 mg delayed release capsule TAKE 1 CAPSULE BY MOUTH EVERY DAY   • rosuvastatin (CRESTOR) 5 mg tablet TAKE 1 TABLET BY MOUTH EVERY DAY   • sertraline (ZOLOFT) 50 mg tablet TAKE 1 TABLET BY MOUTH EVERY DAY   • [DISCONTINUED] amLODIPine (NORVASC) 5 mg tablet Take 5 mg by mouth daily (Patient not taking: Reported on 2023)     Allergies   Allergen Reactions   • Oxycodone Diarrhea, Dizziness, GI Intolerance, Headache, Irritability and Tremor     Other reaction(s):  Anxiety, Nausea and/or vomiting, Other (See Comments)  Dizziness     • No Active Allergies      Immunization History   Administered Date(s) Administered   • COVID-19 PFIZER VACCINE 0 3 ML IM 03/05/2021, 03/26/2021, 12/14/2021   • COVID-19, unspecified 03/25/2021   • INFLUENZA 10/14/2015, 10/11/2016, 11/06/2017, 11/13/2018, 10/20/2022   • Influenza Quadrivalent Preservative Free ID 11/20/2013, 10/09/2014, 10/14/2015, 11/09/2015, 10/23/2020   • Influenza Split 12/06/2012, 11/20/2013, 10/09/2014   • Influenza Split High Dose Preservative Free IM 10/14/2015, 11/09/2015, 10/11/2016, 11/06/2017, 11/13/2018, 11/02/2019, 10/23/2020, 11/18/2021   • Influenza, Seasonal Vaccine, Quadrivalent, Adjuvanted,   5e 10/20/2022   • Influenza, high dose seasonal 0 7 mL 10/23/2020, 11/18/2021   • Pneumococcal Conjugate 13-Valent 04/12/2016   • Pneumococcal Polysaccharide PPV23 10/18/2002, 11/16/2007       Objective     /60 (BP Location: Left arm, Patient Position: Sitting, Cuff Size: Large)   Pulse (!) 52   Temp 97 8 °F (36 6 °C) (Tympanic)   Ht 5' 2" (1 575 m)   Wt 83 kg (183 lb)   LMP  (LMP Unknown)   SpO2 97%   BMI 33 47 kg/m²     Physical Exam  Vitals and nursing note reviewed  Constitutional:       Appearance: She is well-developed  HENT:      Head: Normocephalic and atraumatic  Eyes:      Pupils: Pupils are equal, round, and reactive to light  Cardiovascular:      Rate and Rhythm: Normal rate and regular rhythm  Heart sounds: Normal heart sounds  Pulmonary:      Effort: Pulmonary effort is normal       Breath sounds: Normal breath sounds  Abdominal:      General: Bowel sounds are normal       Palpations: Abdomen is soft  Musculoskeletal:         General: Normal range of motion  Cervical back: Normal range of motion and neck supple  Lymphadenopathy:      Cervical: No cervical adenopathy  Skin:     General: Skin is warm  Neurological:      Mental Status: She is alert and oriented to person, place, and time  Cranial Nerves: No cranial nerve deficit         Wassim Melvina Faulkner MD BMI Counseling: Body mass index is 33 47 kg/m²  The BMI is above normal  Nutrition recommendations include reducing portion sizes, decreasing overall calorie intake and 3-5 servings of fruits/vegetables daily  Exercise recommendations include moderate aerobic physical activity for 150 minutes/week

## 2023-03-24 ENCOUNTER — APPOINTMENT (OUTPATIENT)
Dept: LAB | Facility: IMAGING CENTER | Age: 83
End: 2023-03-24

## 2023-03-24 DIAGNOSIS — D51.8 OTHER VITAMIN B12 DEFICIENCY ANEMIAS: ICD-10-CM

## 2023-03-24 DIAGNOSIS — D45 PV (POLYCYTHEMIA VERA) (HCC): ICD-10-CM

## 2023-03-24 LAB
ALBUMIN SERPL BCP-MCNC: 3.8 G/DL (ref 3.5–5)
ALP SERPL-CCNC: 69 U/L (ref 46–116)
ALT SERPL W P-5'-P-CCNC: 22 U/L (ref 12–78)
ANION GAP SERPL CALCULATED.3IONS-SCNC: 2 MMOL/L (ref 4–13)
AST SERPL W P-5'-P-CCNC: 23 U/L (ref 5–45)
BASOPHILS # BLD AUTO: 0.06 THOUSANDS/ÂΜL (ref 0–0.1)
BASOPHILS NFR BLD AUTO: 1 % (ref 0–1)
BILIRUB SERPL-MCNC: 0.57 MG/DL (ref 0.2–1)
BUN SERPL-MCNC: 23 MG/DL (ref 5–25)
CALCIUM SERPL-MCNC: 9.6 MG/DL (ref 8.3–10.1)
CHLORIDE SERPL-SCNC: 110 MMOL/L (ref 96–108)
CO2 SERPL-SCNC: 29 MMOL/L (ref 21–32)
CREAT SERPL-MCNC: 1.24 MG/DL (ref 0.6–1.3)
CRP SERPL QL: <3 MG/L
EOSINOPHIL # BLD AUTO: 0.11 THOUSAND/ÂΜL (ref 0–0.61)
EOSINOPHIL NFR BLD AUTO: 2 % (ref 0–6)
ERYTHROCYTE [DISTWIDTH] IN BLOOD BY AUTOMATED COUNT: 14.6 % (ref 11.6–15.1)
ERYTHROCYTE [SEDIMENTATION RATE] IN BLOOD: 9 MM/HOUR (ref 0–29)
GFR SERPL CREATININE-BSD FRML MDRD: 41 ML/MIN/1.73SQ M
GLUCOSE P FAST SERPL-MCNC: 98 MG/DL (ref 65–99)
HCT VFR BLD AUTO: 42.1 % (ref 34.8–46.1)
HGB BLD-MCNC: 14 G/DL (ref 11.5–15.4)
IMM GRANULOCYTES # BLD AUTO: 0.01 THOUSAND/UL (ref 0–0.2)
IMM GRANULOCYTES NFR BLD AUTO: 0 % (ref 0–2)
LDH SERPL-CCNC: 202 U/L (ref 81–234)
LYMPHOCYTES # BLD AUTO: 1.01 THOUSANDS/ÂΜL (ref 0.6–4.47)
LYMPHOCYTES NFR BLD AUTO: 19 % (ref 14–44)
MCH RBC QN AUTO: 35.1 PG (ref 26.8–34.3)
MCHC RBC AUTO-ENTMCNC: 33.3 G/DL (ref 31.4–37.4)
MCV RBC AUTO: 106 FL (ref 82–98)
MONOCYTES # BLD AUTO: 0.25 THOUSAND/ÂΜL (ref 0.17–1.22)
MONOCYTES NFR BLD AUTO: 5 % (ref 4–12)
NEUTROPHILS # BLD AUTO: 3.99 THOUSANDS/ÂΜL (ref 1.85–7.62)
NEUTS SEG NFR BLD AUTO: 73 % (ref 43–75)
NRBC BLD AUTO-RTO: 0 /100 WBCS
PLATELET # BLD AUTO: 164 THOUSANDS/UL (ref 149–390)
PMV BLD AUTO: 10.5 FL (ref 8.9–12.7)
POTASSIUM SERPL-SCNC: 4 MMOL/L (ref 3.5–5.3)
PROT SERPL-MCNC: 6.8 G/DL (ref 6.4–8.4)
RBC # BLD AUTO: 3.99 MILLION/UL (ref 3.81–5.12)
SODIUM SERPL-SCNC: 141 MMOL/L (ref 135–147)
VIT B12 SERPL-MCNC: 1499 PG/ML (ref 100–900)
WBC # BLD AUTO: 5.43 THOUSAND/UL (ref 4.31–10.16)

## 2023-04-03 ENCOUNTER — OFFICE VISIT (OUTPATIENT)
Dept: HEMATOLOGY ONCOLOGY | Facility: CLINIC | Age: 83
End: 2023-04-03

## 2023-04-03 VITALS
BODY MASS INDEX: 33.49 KG/M2 | HEIGHT: 62 IN | OXYGEN SATURATION: 97 % | HEART RATE: 67 BPM | SYSTOLIC BLOOD PRESSURE: 128 MMHG | DIASTOLIC BLOOD PRESSURE: 80 MMHG | WEIGHT: 182 LBS | TEMPERATURE: 98.3 F | RESPIRATION RATE: 16 BRPM

## 2023-04-03 DIAGNOSIS — D51.8 OTHER VITAMIN B12 DEFICIENCY ANEMIAS: ICD-10-CM

## 2023-04-03 DIAGNOSIS — D45 PV (POLYCYTHEMIA VERA) (HCC): Primary | ICD-10-CM

## 2023-04-03 NOTE — PROGRESS NOTES
Hematology/Oncology Outpatient Follow-up  Desmond More [de-identified] y o  female 1940 [id number]    Date:  4/3/2023        Assessment and Plan:  1  PV (polycythemia vera) ([facility] )  I reviewed the recent CBC results with the patient which showed hematocrit less than 45% on the current dose of Hydrea 500 mg daily for 2 days on and 1 day off  I did asked the patient to continue with the current schedule of Hydrea without interruption  She was also encouraged to take the baby aspirin as well  The patient had a question regarding her daughter which was found to have JAK2 mutation just recently  I did ask her to discuss the new findings with her PCP to make a decision if hematological referral would be necessary     - CBC and differential; Future  - Comprehensive metabolic panel; Future  - Magnesium; Future  - LD,Blood; Future  - C-reactive protein; Future  - Sedimentation rate, automated; Future    2  Other vitamin B12 deficiency anemias  Vitamin B12 is above normal   I did ask her to take the vitamin B12 supplements less frequently  - CBC and differential; Future  - Vitamin B12; Future        HPI:  The patient came today for follow-up visit  She stated that she continues to take the Hydrea at the current dose of 500 mg daily 2 days on and 1 day off  Recent blood work on 3/24/2023 showed hemoglobin of 14 0 with normal white cells and platelets    Creatinine 1 2 with normal calcium and liver enzymes    C-reactive protein and sed rate are normal   Vitamin B12 1499  Oncology History Overview Note   Patient had extensive workup for further evaluation of her elevated H&H  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, which is diagnostic for polycythemia vera  She was started on Hydrea 500 mg once a day February 10, 2016  The dose was increased to 500 mg twice a day which resulted in significant drop of her platelet count  We did then decrease the dose and put on hold April 4, 2016  Patient had 2 phlebotomies while she was off the Hydrea  She is currently on Hydrea 500 mg once a day 2 days on and 1 day off and tolerating this dose well  Polycythemia vera ([address] )   2/3/2016 Initial Diagnosis    PV (polycythemia vera) ([facility])         Interval history:    ROS: Review of Systems   Constitutional: Positive for fatigue  Negative for chills and fever  HENT: Positive for hearing loss  Negative for ear pain and sore throat  Eyes: Negative for pain and visual disturbance  Respiratory: Positive for cough and shortness of breath  Cardiovascular: Negative for chest pain and palpitations  Gastrointestinal: Negative for abdominal pain and vomiting  Genitourinary: Negative for dysuria and hematuria  Musculoskeletal: Positive for arthralgias  Negative for back pain  Skin: Positive for rash  Negative for color change  Neurological: Positive for numbness  Negative for seizures and syncope  All other systems reviewed and are negative        Past Medical History:   Diagnosis Date   • CAD (coronary artery disease)        Past Surgical History:   Procedure Laterality Date   • BREAST BIOPSY     • CAROTID ENDARTARECTOMY Left    • CHOLECYSTECTOMY       and    • HYSTERECTOMY     • KNEE CARTILAGE SURGERY Right    • OOPHORECTOMY     • REPLACEMENT TOTAL KNEE         Social History     Socioeconomic History   • Marital status: /Civil Union     Spouse name: None   • Number of children: None   • Years of education: None   • Highest education level: None   Occupational History   • None   Tobacco Use   • Smoking status: Former     Types: Cigarettes     Quit date:      Years since quittin 2     Passive exposure: Past (SPOUSE QUIT AT [address])   • Smokeless tobacco: Never   • Tobacco comments:     no passive smoke exposure   Vaping Use   • Vaping Use: Never used   Substance and Sexual Activity   • Alcohol use: No   • Drug use: No   • Sexual activity: None Other Topics Concern   • None   Social History Narrative   • None     Social Determinants of Health     Financial Resource Strain: Not on file   Food Insecurity: Not on file   Transportation Needs: Not on file   Physical Activity: Not on file   Stress: Not on file   Social Connections: Not on file   Intimate Partner Violence: Not on file   Housing Stability: Not on file       Family History   Problem Relation Age of Onset   • Heart disease Mother    • Hypertension Mother    • Diabetes type II Mother    • Cancer Father    • Cystic fibrosis Daughter    • Breast cancer Maternal Aunt 48   • Breast cancer Cousin 30       Allergies   Allergen Reactions   • Oxycodone Diarrhea, GI Intolerance, Dizziness, Headache, Irritability and Tremor     Other reaction(s):  Anxiety, Nausea and/or vomiting, Other (See Comments)  Dizziness           Current Outpatient Medications:   •  aspirin 81 mg chewable tablet, Chew 81 mg daily, Disp: , Rfl:   •  Cholecalciferol (Vitamin D3) 125 MCG (5000 UT) TABS, Take by mouth daily, Disp: , Rfl:   •  cyanocobalamin (VITAMIN B-12) 100 mcg tablet, Take 1,000 mcg by mouth, Disp: , Rfl:   •  doxazosin (CARDURA) 1 mg tablet, , Disp: , Rfl:   •  hydrocortisone 2 5 % cream, APPLY TO AFFECTED AREA ON THE STOMACH TWICE A DAY, Disp: , Rfl:   •  hydroxyurea (HYDREA) 500 mg capsule, Take pills 2 days on 1 day off, Disp: 180 capsule, Rfl: 3  •  losartan (COZAAR) 50 mg tablet, Take 1 tablet (50 mg total) by mouth daily, Disp: 90 tablet, Rfl: 0  •  metoprolol tartrate (LOPRESSOR) 50 mg tablet, TAKE 1/2 TABLET BY MOUTH EVERY 12 HOURS, Disp: 90 tablet, Rfl: 1  •  omeprazole (PriLOSEC) 20 mg delayed release capsule, TAKE 1 CAPSULE BY MOUTH EVERY DAY, Disp: 90 capsule, Rfl: 1  •  rosuvastatin (CRESTOR) 5 mg tablet, TAKE 1 TABLET BY MOUTH EVERY DAY, Disp: 90 tablet, Rfl: 1  •  sertraline (ZOLOFT) 50 mg tablet, TAKE 1 TABLET BY MOUTH EVERY DAY, Disp: 90 tablet, Rfl: 1      Physical Exam:  /80 (BP Location: Left "arm, Patient Position: Sitting, Cuff Size: Adult)   Pulse 67   Temp 98 3 °F (36 8 °C) (Temporal)   Resp 16   Ht 5' 2\" (1 575 m)   Wt 82 6 kg (182 lb)   LMP  (LMP Unknown)   SpO2 97%   BMI 33 29 kg/m²     Physical Exam  Constitutional:       General: She is not in acute distress  Appearance: She is well-developed  She is obese  She is not diaphoretic  HENT:      Head: Normocephalic and atraumatic  Eyes:      General: No scleral icterus  Right eye: No discharge  Left eye: No discharge  Conjunctiva/sclera: Conjunctivae normal       Pupils: Pupils are equal, round, and reactive to light  Neck:      Thyroid: No thyromegaly  Vascular: No JVD  Trachea: No tracheal deviation  Cardiovascular:      Rate and Rhythm: Normal rate and regular rhythm  Heart sounds: Normal heart sounds  No murmur heard  No friction rub  Pulmonary:      Effort: Pulmonary effort is normal  No respiratory distress  Breath sounds: Normal breath sounds  No stridor  No wheezing or rales  Chest:      Chest wall: No tenderness  Abdominal:      General: There is no distension  Palpations: Abdomen is soft  There is no hepatomegaly or splenomegaly  Tenderness: There is no abdominal tenderness  There is no guarding or rebound  Musculoskeletal:         General: No tenderness or deformity  Normal range of motion  Cervical back: Normal range of motion and neck supple  Lymphadenopathy:      Cervical: No cervical adenopathy  Skin:     General: Skin is warm and dry  Coloration: Skin is not pale  Findings: No erythema or rash  Neurological:      Mental Status: She is alert and oriented to person, place, and time  Cranial Nerves: No cranial nerve deficit  Coordination: Coordination normal       Deep Tendon Reflexes: Reflexes are normal and symmetric  Psychiatric:         Behavior: Behavior normal          Thought Content:  Thought content normal          " Judgment: Judgment normal            Labs:  Lab Results   Component Value Date    WBC 5 43 03/24/2023    HGB 14 0 03/24/2023    HCT 42 1 03/24/2023     (H) 03/24/2023     03/24/2023     Lab Results   Component Value Date    K 4 0 03/24/2023     (H) 03/24/2023    CO2 29 03/24/2023    BUN 23 03/24/2023    CREATININE 1 24 03/24/2023    GLUF 98 03/24/2023    CALCIUM 9 6 03/24/2023    CORRECTEDCA 9 4 09/15/2022    AST 23 03/24/2023    ALT 22 03/24/2023    ALKPHOS 69 03/24/2023    EGFR 41 03/24/2023     Lab Results   Component Value Date    TSH 1 64 04/26/2018       Patient voiced understanding and agreement in the above discussion  Aware to contact our office with questions/symptoms in the interim

## 2023-05-22 DIAGNOSIS — E78.49 OTHER HYPERLIPIDEMIA: ICD-10-CM

## 2023-05-22 RX ORDER — ROSUVASTATIN CALCIUM 5 MG/1
TABLET, COATED ORAL
Qty: 90 TABLET | Refills: 1 | Status: SHIPPED | OUTPATIENT
Start: 2023-05-22

## 2023-06-06 DIAGNOSIS — F32.0 CURRENT MILD EPISODE OF MAJOR DEPRESSIVE DISORDER, UNSPECIFIED WHETHER RECURRENT (HCC): ICD-10-CM

## 2023-06-06 DIAGNOSIS — I10 ESSENTIAL HYPERTENSION: ICD-10-CM

## 2023-06-06 RX ORDER — METOPROLOL TARTRATE 50 MG/1
TABLET, FILM COATED ORAL
Qty: 90 TABLET | Refills: 1 | Status: SHIPPED | OUTPATIENT
Start: 2023-06-06

## 2023-06-06 NOTE — TELEPHONE ENCOUNTER
Pharmacy requesting refill on metoprolol and Zoloft   Last seen 2/21/23  Medication sent to pharmacy

## 2023-06-17 DIAGNOSIS — E78.49 OTHER HYPERLIPIDEMIA: ICD-10-CM

## 2023-06-19 RX ORDER — ROSUVASTATIN CALCIUM 5 MG/1
5 TABLET, COATED ORAL DAILY
Qty: 90 TABLET | Refills: 0 | Status: SHIPPED | OUTPATIENT
Start: 2023-06-19

## 2023-06-21 ENCOUNTER — APPOINTMENT (OUTPATIENT)
Dept: LAB | Facility: IMAGING CENTER | Age: 83
End: 2023-06-21
Payer: COMMERCIAL

## 2023-06-21 DIAGNOSIS — E11.36 TYPE 2 DIABETES MELLITUS WITH DIABETIC CATARACT, WITHOUT LONG-TERM CURRENT USE OF INSULIN (HCC): ICD-10-CM

## 2023-06-21 LAB
ALBUMIN SERPL BCP-MCNC: 3.6 G/DL (ref 3.5–5)
ALP SERPL-CCNC: 82 U/L (ref 46–116)
ALT SERPL W P-5'-P-CCNC: 22 U/L (ref 12–78)
ANION GAP SERPL CALCULATED.3IONS-SCNC: 1 MMOL/L
AST SERPL W P-5'-P-CCNC: 17 U/L (ref 5–45)
BASOPHILS # BLD AUTO: 0.06 THOUSANDS/ÂΜL (ref 0–0.1)
BASOPHILS NFR BLD AUTO: 1 % (ref 0–1)
BILIRUB SERPL-MCNC: 0.57 MG/DL (ref 0.2–1)
BUN SERPL-MCNC: 34 MG/DL (ref 5–25)
CALCIUM SERPL-MCNC: 9.4 MG/DL (ref 8.3–10.1)
CHLORIDE SERPL-SCNC: 112 MMOL/L (ref 96–108)
CHOLEST SERPL-MCNC: 138 MG/DL
CO2 SERPL-SCNC: 28 MMOL/L (ref 21–32)
CREAT SERPL-MCNC: 1.43 MG/DL (ref 0.6–1.3)
CREAT UR-MCNC: 94.9 MG/DL
EOSINOPHIL # BLD AUTO: 0.13 THOUSAND/ÂΜL (ref 0–0.61)
EOSINOPHIL NFR BLD AUTO: 2 % (ref 0–6)
ERYTHROCYTE [DISTWIDTH] IN BLOOD BY AUTOMATED COUNT: 14.6 % (ref 11.6–15.1)
EST. AVERAGE GLUCOSE BLD GHB EST-MCNC: 105 MG/DL
GFR SERPL CREATININE-BSD FRML MDRD: 34 ML/MIN/1.73SQ M
GLUCOSE P FAST SERPL-MCNC: 96 MG/DL (ref 65–99)
HBA1C MFR BLD: 5.3 %
HCT VFR BLD AUTO: 44.9 % (ref 34.8–46.1)
HDLC SERPL-MCNC: 54 MG/DL
HGB BLD-MCNC: 14.2 G/DL (ref 11.5–15.4)
IMM GRANULOCYTES # BLD AUTO: 0.03 THOUSAND/UL (ref 0–0.2)
IMM GRANULOCYTES NFR BLD AUTO: 1 % (ref 0–2)
LDLC SERPL CALC-MCNC: 62 MG/DL (ref 0–100)
LYMPHOCYTES # BLD AUTO: 1.12 THOUSANDS/ÂΜL (ref 0.6–4.47)
LYMPHOCYTES NFR BLD AUTO: 20 % (ref 14–44)
MCH RBC QN AUTO: 34.1 PG (ref 26.8–34.3)
MCHC RBC AUTO-ENTMCNC: 31.6 G/DL (ref 31.4–37.4)
MCV RBC AUTO: 108 FL (ref 82–98)
MICROALBUMIN UR-MCNC: 14.9 MG/L (ref 0–20)
MICROALBUMIN/CREAT 24H UR: 16 MG/G CREATININE (ref 0–30)
MONOCYTES # BLD AUTO: 0.24 THOUSAND/ÂΜL (ref 0.17–1.22)
MONOCYTES NFR BLD AUTO: 4 % (ref 4–12)
NEUTROPHILS # BLD AUTO: 4.01 THOUSANDS/ÂΜL (ref 1.85–7.62)
NEUTS SEG NFR BLD AUTO: 72 % (ref 43–75)
NRBC BLD AUTO-RTO: 0 /100 WBCS
PLATELET # BLD AUTO: 141 THOUSANDS/UL (ref 149–390)
PMV BLD AUTO: 10.3 FL (ref 8.9–12.7)
POTASSIUM SERPL-SCNC: 4.4 MMOL/L (ref 3.5–5.3)
PROT SERPL-MCNC: 7.2 G/DL (ref 6.4–8.4)
RBC # BLD AUTO: 4.16 MILLION/UL (ref 3.81–5.12)
SODIUM SERPL-SCNC: 141 MMOL/L (ref 135–147)
TRIGL SERPL-MCNC: 112 MG/DL
TSH SERPL DL<=0.05 MIU/L-ACNC: 2.46 UIU/ML (ref 0.45–4.5)
WBC # BLD AUTO: 5.59 THOUSAND/UL (ref 4.31–10.16)

## 2023-06-21 PROCEDURE — 85025 COMPLETE CBC W/AUTO DIFF WBC: CPT

## 2023-06-21 PROCEDURE — 80053 COMPREHEN METABOLIC PANEL: CPT

## 2023-06-21 PROCEDURE — 84443 ASSAY THYROID STIM HORMONE: CPT

## 2023-06-21 PROCEDURE — 83036 HEMOGLOBIN GLYCOSYLATED A1C: CPT

## 2023-06-21 PROCEDURE — 82043 UR ALBUMIN QUANTITATIVE: CPT

## 2023-06-21 PROCEDURE — 80061 LIPID PANEL: CPT

## 2023-06-21 PROCEDURE — 82570 ASSAY OF URINE CREATININE: CPT

## 2023-06-21 PROCEDURE — 36415 COLL VENOUS BLD VENIPUNCTURE: CPT

## 2023-07-05 ENCOUNTER — TELEPHONE (OUTPATIENT)
Dept: FAMILY MEDICINE CLINIC | Facility: CLINIC | Age: 83
End: 2023-07-05

## 2023-07-05 NOTE — TELEPHONE ENCOUNTER
----- Message from Eugenio Ann MD sent at 7/5/2023  6:53 AM EDT -----  Blood work showed cholesterol and A1c are well controlled. GFR is low but stable.   Please check with patient if she is following with nephrologist.

## 2023-07-08 DIAGNOSIS — K21.9 GASTROESOPHAGEAL REFLUX DISEASE WITHOUT ESOPHAGITIS: ICD-10-CM

## 2023-07-08 DIAGNOSIS — I10 ESSENTIAL HYPERTENSION: ICD-10-CM

## 2023-07-10 RX ORDER — LOSARTAN POTASSIUM 50 MG/1
50 TABLET ORAL DAILY
Qty: 90 TABLET | Refills: 0 | Status: SHIPPED | OUTPATIENT
Start: 2023-07-10

## 2023-07-10 RX ORDER — OMEPRAZOLE 20 MG/1
20 CAPSULE, DELAYED RELEASE ORAL DAILY
Qty: 90 CAPSULE | Refills: 0 | Status: SHIPPED | OUTPATIENT
Start: 2023-07-10

## 2023-07-24 ENCOUNTER — RA CDI HCC (OUTPATIENT)
Dept: OTHER | Facility: HOSPITAL | Age: 83
End: 2023-07-24

## 2023-07-24 NOTE — PROGRESS NOTES
[address] [facility] coding opportunities          Chart Reviewed number of suggestions sent to Provider: 3   e11.51  I20.9  [id number]  Patients Insurance     Medicare Insurance: [employer]

## 2023-07-31 ENCOUNTER — OFFICE VISIT (OUTPATIENT)
Dept: FAMILY MEDICINE CLINIC | Facility: CLINIC | Age: 83
End: 2023-07-31
Payer: COMMERCIAL

## 2023-07-31 VITALS
RESPIRATION RATE: 16 BRPM | HEIGHT: 62 IN | OXYGEN SATURATION: 99 % | BODY MASS INDEX: 32.39 KG/M2 | DIASTOLIC BLOOD PRESSURE: 70 MMHG | HEART RATE: 56 BPM | SYSTOLIC BLOOD PRESSURE: 138 MMHG | WEIGHT: 176 LBS | TEMPERATURE: 97.1 F

## 2023-07-31 DIAGNOSIS — I10 ESSENTIAL HYPERTENSION: ICD-10-CM

## 2023-07-31 DIAGNOSIS — Z00.00 MEDICARE ANNUAL WELLNESS VISIT, SUBSEQUENT: ICD-10-CM

## 2023-07-31 DIAGNOSIS — L30.9 DERMATITIS: ICD-10-CM

## 2023-07-31 DIAGNOSIS — E11.3292 TYPE 2 DIABETES MELLITUS WITH LEFT EYE AFFECTED BY MILD NONPROLIFERATIVE RETINOPATHY WITHOUT MACULAR EDEMA, WITHOUT LONG-TERM CURRENT USE OF INSULIN (HCC): Primary | ICD-10-CM

## 2023-07-31 DIAGNOSIS — I25.119 ATHEROSCLEROSIS OF NATIVE CORONARY ARTERY OF NATIVE HEART WITH ANGINA PECTORIS (HCC): ICD-10-CM

## 2023-07-31 DIAGNOSIS — F32.89 OTHER DEPRESSION: ICD-10-CM

## 2023-07-31 DIAGNOSIS — N18.32 STAGE 3B CHRONIC KIDNEY DISEASE (HCC): ICD-10-CM

## 2023-07-31 DIAGNOSIS — E78.49 OTHER HYPERLIPIDEMIA: ICD-10-CM

## 2023-07-31 PROCEDURE — 3078F DIAST BP <80 MM HG: CPT | Performed by: FAMILY MEDICINE

## 2023-07-31 PROCEDURE — 1159F MED LIST DOCD IN RCRD: CPT | Performed by: FAMILY MEDICINE

## 2023-07-31 PROCEDURE — G0439 PPPS, SUBSEQ VISIT: HCPCS | Performed by: FAMILY MEDICINE

## 2023-07-31 PROCEDURE — 1160F RVW MEDS BY RX/DR IN RCRD: CPT | Performed by: FAMILY MEDICINE

## 2023-07-31 PROCEDURE — 3288F FALL RISK ASSESSMENT DOCD: CPT | Performed by: FAMILY MEDICINE

## 2023-07-31 PROCEDURE — 1170F FXNL STATUS ASSESSED: CPT | Performed by: FAMILY MEDICINE

## 2023-07-31 PROCEDURE — 99214 OFFICE O/P EST MOD 30 MIN: CPT | Performed by: FAMILY MEDICINE

## 2023-07-31 PROCEDURE — 3075F SYST BP GE 130 - 139MM HG: CPT | Performed by: FAMILY MEDICINE

## 2023-07-31 PROCEDURE — 1125F AMNT PAIN NOTED PAIN PRSNT: CPT | Performed by: FAMILY MEDICINE

## 2023-07-31 RX ORDER — FUROSEMIDE 20 MG/1
TABLET ORAL
COMMUNITY
Start: 2023-07-10

## 2023-07-31 RX ORDER — ERYTHROMYCIN 20 MG/ML
SOLUTION TOPICAL
COMMUNITY
Start: 2023-05-23

## 2023-07-31 RX ORDER — CLOTRIMAZOLE AND BETAMETHASONE DIPROPIONATE 10; .64 MG/G; MG/G
CREAM TOPICAL 2 TIMES DAILY
Qty: 30 G | Refills: 0 | Status: SHIPPED | OUTPATIENT
Start: 2023-07-31

## 2023-07-31 RX ORDER — SODIUM FLUORIDE 6.1 MG/ML
PASTE, DENTIFRICE DENTAL
COMMUNITY
Start: 2023-07-17

## 2023-07-31 RX ORDER — AMOXICILLIN 500 MG/1
CAPSULE ORAL
COMMUNITY
Start: 2023-05-20

## 2023-07-31 NOTE — ASSESSMENT & PLAN NOTE
Lab Results   Component Value Date    EGFR 34 06/21/2023    EGFR 41 03/24/2023    EGFR 38 02/07/2023    CREATININE 1.43 (H) 06/21/2023    CREATININE 1.24 03/24/2023    CREATININE 1.32 (H) 02/07/2023   GFR decreased but has been stable. Continue to encourage oral hydration.   Continue to follow-up with nephrologist.

## 2023-07-31 NOTE — PROGRESS NOTES
Assessment and Plan:     Problem List Items Addressed This Visit        Endocrine    Type 2 diabetes mellitus with left eye affected by mild nonproliferative retinopathy without macular edema, without long-term current use of insulin ([address]) - Primary     Well-controlled. Continue same. We will continue to monitor. Lab Results   Component Value Date    HGBA1C 5.3 06/21/2023            Relevant Orders    Albumin / creatinine urine ratio    Comprehensive metabolic panel    Hemoglobin A1C    CBC and differential    Lipid Panel with Direct LDL reflex    TSH, 3rd generation with Free T4 reflex       Cardiovascular and Mediastinum    Essential hypertension     Well-controlled. Continue on losartan and metoprolol. Continue to monitor. Relevant Medications    furosemide (LASIX) 20 mg tablet    Atherosclerosis of native coronary artery of native heart with angina pectoris (HCC)     Stable. Asymptomatic. She was told to start seeing cardiologist.            Musculoskeletal and Integument    Dermatitis     She was given a prescription for Lotrisone cream.         Relevant Medications    erythromycin with ethanol (THERAMYCIN) 2 % external solution    clotrimazole-betamethasone (LOTRISONE) 1-0.05 % cream       Genitourinary    Stage 3b chronic kidney disease ([address])     Lab Results   Component Value Date    EGFR 34 06/21/2023    EGFR 41 03/24/2023    EGFR 38 02/07/2023    CREATININE 1.43 (H) 06/21/2023    CREATININE 1.24 03/24/2023    CREATININE 1.32 (H) 02/07/2023   GFR decreased but has been stable. Continue to encourage oral hydration. Continue to follow-up with nephrologist.         Relevant Medications    furosemide (LASIX) 20 mg tablet       Other    Depression     Stable. Continue same. We will continue to monitor. Hyperlipidemia     Well-controlled. Continue on Crestor. We will continue to monitor fasting lipid profile.          Medicare annual wellness visit, subsequent     It was discussed about immunizations, diet, exercise and safety measures. Preventive health issues were discussed with patient, and age appropriate screening tests were ordered as noted in patient's After Visit Summary. Personalized health advice and appropriate referrals for health education or preventive services given if needed, as noted in patient's After Visit Summary. History of Present Illness:     Patient presents for a Medicare Wellness Visit    She is here today for follow-up multiple medical problems. She has been taking her medications. She denies any side effects from her medications. Her blood work in June came back showing A1c well controlled. Cholesterol is normal.  Her GFR was low but has been stable. Patient Care Team:  Ayah Hernandez MD as PCP - General (Family Medicine)  Ayah Hernandez MD as PCP - [facility]     Review of Systems:     Review of Systems   Constitutional: Negative for chills and fever. HENT: Negative for trouble swallowing. Eyes: Negative for visual disturbance. Respiratory: Negative for cough and shortness of breath. Cardiovascular: Negative for chest pain, palpitations and leg swelling. Gastrointestinal: Negative for abdominal pain, constipation and diarrhea. Endocrine: Negative for cold intolerance and heat intolerance. Genitourinary: Negative for difficulty urinating and dysuria. Musculoskeletal: Negative for gait problem. Skin: Positive for rash. Neurological: Negative for dizziness, tremors, seizures and headaches. Hematological: Negative for adenopathy. Psychiatric/Behavioral: Negative for behavioral problems.         Problem List:     Patient Active Problem List   Diagnosis   • Polycythemia vera ([address])   • Other vitamin B12 deficiency anemias   • Allergic rhinitis   • Angina pectoris (HCC)   • Anxiety   • Arteriosclerosis of coronary artery   • Benign neoplasm of colon   • Carotid stenosis   • Cholecystitis • Colon polyp   • Depression   • Gastroesophageal reflux disease   • Herpes zoster   • Hyperlipidemia   • Osteoarthritis   • Morbid obesity ([address])   • Lymphedema   • Posterior vitreous detachment   • Renal failure   • Retinal detachment   • Vitamin D deficiency   • Hypertensive kidney disease with chronic kidney disease, stage 1-4 or unspecified chronic kidney disease   • Skin infection   • Psoriasis   • Medicare annual wellness visit, subsequent   • PAOD (peripheral arterial occlusive disease) ([address])   • Injury of right popliteal artery   • Stage 3b chronic kidney disease ([facility])   • Chronic pain of left ankle   • Dermatitis   • Major depressive disorder, single episode, mild ([facility])   • Type 2 diabetes mellitus with left eye affected by mild nonproliferative retinopathy without macular edema, without long-term current use of insulin ([facility])   • Acute left-sided low back pain with left-sided sciatica   • Type 2 diabetes mellitus with chronic kidney disease ([address])   • Essential hypertension   • Blister of scalp with infection   • Unspecified atherosclerosis of native arteries of extremities, bilateral legs ([facility])   • Peripheral vascular disease, unspecified ([address])   • Type 2 diabetes mellitus with diabetic cataract ([facility])   • Age-related nuclear cataract, bilateral   • Type 2 diabetes mellitus with diabetic peripheral angiopathy without gangrene ([address])   • Cataracta   • Pre-op examination   • Status post popliteal-distal bypass surgery   • Class 1 obesity due to excess calories with serious comorbidity and body mass index (BMI) of 33.0 to 33.9 in adult   • Atherosclerosis of native coronary artery of native heart with angina pectoris ([facility])   • Other specified peripheral vascular diseases ([facility])   • Morbid (severe) obesity due to excess calories ([facility])   • PV (polycythemia vera) ([facility])      Past Medical and Surgical History:     Past Medical History:   Diagnosis Date   • CAD (coronary artery disease)      Past Surgical History: Procedure Laterality Date   • BREAST BIOPSY     • CAROTID ENDARTARECTOMY Left    • CHOLECYSTECTOMY       and    • HYSTERECTOMY  1981   • KNEE CARTILAGE SURGERY Right 2001   • OOPHORECTOMY     • REPLACEMENT TOTAL KNEE        Family History:     Family History   Problem Relation Age of Onset   • Heart disease Mother    • Hypertension Mother    • Diabetes type II Mother    • Cancer Father    • Cystic fibrosis Daughter    • Breast cancer Maternal Aunt 48   • Breast cancer Cousin 27      Social History:     Social History     Socioeconomic History   • Marital status: /Civil Union     Spouse name: None   • Number of children: None   • Years of education: None   • Highest education level: None   Occupational History   • None   Tobacco Use   • Smoking status: Former     Types: Cigarettes     Quit date:      Years since quittin.6     Passive exposure: Past (SPOUSE QUIT AT [address] [place] [place])   • Smokeless tobacco: Never   • Tobacco comments:     no passive smoke exposure   Vaping Use   • Vaping Use: Never used   Substance and Sexual Activity   • Alcohol use: No   • Drug use: No   • Sexual activity: None   Other Topics Concern   • None   Social History Narrative   • None     Social Determinants of Health     Financial Resource Strain: Low Risk  (2023)    Overall Financial Resource Strain (CARDIA)    • Difficulty of Paying Living Expenses: Not very hard   Food Insecurity: Not on file   Transportation Needs: No Transportation Needs (2023)    PRAPARE - Transportation    • Lack of Transportation (Medical): No    • Lack of Transportation (Non-Medical):  No   Physical Activity: Not on file   Stress: Not on file   Social Connections: Not on file   Intimate Partner Violence: Not on file   Housing Stability: Not on file      Medications and Allergies:     Current Outpatient Medications   Medication Sig Dispense Refill   • amoxicillin (AMOXIL) 500 mg capsule TAKE 4 CAPS 1 HOUR BEFORE VISITS     • aspirin 81 mg chewable tablet Chew 81 mg daily     • Cholecalciferol (Vitamin D3) 125 MCG (5000 UT) TABS Take by mouth daily     • clotrimazole-betamethasone (LOTRISONE) 1-0.05 % cream Apply topically 2 (two) times a day 30 g 0   • cyanocobalamin (VITAMIN B-12) 100 mcg tablet Take 1,000 mcg by mouth     • erythromycin with ethanol (THERAMYCIN) 2 % external solution APPLY TO AFFECTED AREA ON SCALP EVERY DAY AFTER SHOWERING     • furosemide (LASIX) 20 mg tablet TAKE 1 TABLET (20 MG TOTAL) BY MOUTH PER WEEK     • hydrocortisone 2.5 % cream APPLY TO AFFECTED AREA ON THE STOMACH TWICE A DAY     • hydroxyurea (HYDREA) 500 mg capsule Take pills 2 days on 1 day off 180 capsule 3   • losartan (COZAAR) 50 mg tablet Take 1 tablet (50 mg total) by mouth daily 90 tablet 0   • metoprolol tartrate (LOPRESSOR) 50 mg tablet TAKE 1/2 TABLET BY MOUTH EVERY 12 HOURS 90 tablet 1   • omeprazole (PriLOSEC) 20 mg delayed release capsule Take 1 capsule (20 mg total) by mouth daily 90 capsule 0   • rosuvastatin (CRESTOR) 5 mg tablet Take 1 tablet (5 mg total) by mouth daily 90 tablet 0   • sertraline (ZOLOFT) 50 mg tablet TAKE 1 TABLET BY MOUTH EVERY DAY 90 tablet 1   • Sodium Fluoride 5000 PPM 1.1 % GEL BRUSH TWICE DAILY AFTER BRUSHING SPIT OUT BUT DO NOT RINSE       No current facility-administered medications for this visit. Allergies   Allergen Reactions   • Oxycodone Diarrhea, GI Intolerance, Dizziness, Headache, Irritability and Tremor     Other reaction(s):  Anxiety, Nausea and/or vomiting, Other (See Comments)  Dizziness        Immunizations:     Immunization History   Administered Date(s) Administered   • COVID-19 PFIZER VACCINE 0.3 ML IM 03/05/2021, 03/26/2021, 12/14/2021   • COVID-19, unspecified 03/25/2021   • INFLUENZA 10/14/2015, 10/11/2016, 11/06/2017, 11/13/2018, 10/20/2022   • Influenza Quadrivalent Preservative Free ID 11/20/2013, 10/09/2014, 10/14/2015, 11/09/2015, 10/23/2020   • Influenza Split 12/06/2012, 11/20/2013, 10/09/2014   • Influenza Split High Dose Preservative Free IM 10/14/2015, 11/09/2015, 10/11/2016, 11/06/2017, 11/13/2018, 11/02/2019, 10/23/2020, 11/18/2021   • Influenza, Seasonal Vaccine, Quadrivalent, Adjuvanted, . 5e 10/20/2022   • Influenza, high dose seasonal 0.7 mL 10/23/2020, 11/18/2021   • Pneumococcal Conjugate 13-Valent 04/12/2016   • Pneumococcal Polysaccharide PPV23 10/18/2002, 11/16/2007      Health Maintenance: There are no preventive care reminders to display for this patient. Topic Date Due   • COVID-19 Vaccine (5 - Mixed Product series) 02/08/2022   • Influenza Vaccine (1) 09/01/2023      Medicare Screening Tests and Risk Assessments:     Katarzyna Aparicio is here for her Subsequent Wellness visit. Health Risk Assessment:   Patient rates overall health as fair. Patient feels that their physical health rating is slightly worse. Patient is satisfied with their life. Eyesight was rated as same. Hearing was rated as slightly worse. Patient feels that their emotional and mental health rating is same. Patients states they are never, rarely angry. Patient states they are often unusually tired/fatigued. Pain experienced in the last 7 days has been some. Patient's pain rating has been 3/10. Patient states that she has experienced weight loss or gain in last 6 months. Fall Risk Screening: In the past year, patient has experienced: no history of falling in past year      Urinary Incontinence Screening:   Patient has leaked urine accidently in the last six months. Home Safety:  Patient has trouble with stairs inside or outside of their home. Patient has working smoke alarms and has no working carbon monoxide detector. Home safety hazards include: none. Nutrition:   Current diet is Regular. Medications:   Patient is currently taking over-the-counter supplements. OTC medications include: Benadryl  only when itching gets bad> Then I take one. . Patient is able to manage medications. Activities of Daily Living (ADLs)/Instrumental Activities of Daily Living (IADLs):   Walk and transfer into and out of bed and chair?: Yes  Dress and groom yourself?: Yes    Bathe or shower yourself?: Yes    Feed yourself? Yes  Do your laundry/housekeeping?: Yes  Manage your money, pay your bills and track your expenses?: Yes  Make your own meals?: Yes    Do your own shopping?: Yes    Previous Hospitalizations:   Any hospitalizations or ED visits within the last 12 months?: No      Advance Care Planning:   Living will: Yes    Durable POA for healthcare: Yes    Advanced directive: Yes      Cognitive Screening:   Provider or family/friend/caregiver concerned regarding cognition?: No    PREVENTIVE SCREENINGS      Cardiovascular Screening:    General: Screening Not Indicated and History Lipid Disorder      Diabetes Screening:     General: Screening Not Indicated and History Diabetes      Colorectal Cancer Screening:     General: Screening Not Indicated      Breast Cancer Screening:     General: Screening Not Indicated      Cervical Cancer Screening:    General: Screening Not Indicated      Osteoporosis Screening:    General: Risks and Benefits Discussed      Abdominal Aortic Aneurysm (AAA) Screening:        General: Screening Not Indicated      Lung Cancer Screening:     General: Screening Not Indicated      Hepatitis C Screening:    General: Risks and Benefits Discussed    Screening, Brief Intervention, and Referral to Treatment (SBIRT)    Screening  Typical number of drinks in a day: 0  Typical number of drinks in a week: 0  Interpretation: Low risk drinking behavior.     AUDIT-C Screenin) How often did you have a drink containing alcohol in the past year? monthly or less  2) How many drinks did you have on a typical day when you were drinking in the past year? 0  3) How often did you have 6 or more drinks on one occasion in the past year? never    AUDIT-C Score: 1  Interpretation: Score 0-2 (female): Negative screen for alcohol misuse    Single Item Drug Screening:  How often have you used an illegal drug (including marijuana) or a prescription medication for non-medical reasons in the past year? never    Single Item Drug Screen Score: 0  Interpretation: Negative screen for possible drug use disorder    Brief Intervention  Alcohol & drug use screenings were reviewed. No concerns regarding substance use disorder identified. Other Counseling Topics:   Calcium and vitamin D intake and regular weightbearing exercise. No results found. Physical Exam:     /70 (BP Location: Left arm, Patient Position: Sitting, Cuff Size: Adult)   Pulse 56   Temp (!) 97.1 °F (36.2 °C) (Tympanic)   Resp 16   Ht 5' 2" (1.575 m)   Wt 79.8 kg (176 lb)   LMP  (LMP Unknown)   SpO2 99%   BMI 32.19 kg/m²     Physical Exam  Vitals and nursing note reviewed. Constitutional:       General: She is not in acute distress. Appearance: She is well-developed. HENT:      Head: Normocephalic and atraumatic. Eyes:      Conjunctiva/sclera: Conjunctivae normal.   Cardiovascular:      Rate and Rhythm: Normal rate and regular rhythm. Heart sounds: No murmur heard. Pulmonary:      Effort: Pulmonary effort is normal. No respiratory distress. Breath sounds: Normal breath sounds. Abdominal:      Palpations: Abdomen is soft. Tenderness: There is no abdominal tenderness. Musculoskeletal:         General: No swelling. Cervical back: Neck supple. Skin:     General: Skin is warm and dry. Capillary Refill: Capillary refill takes less than 2 seconds. Findings: Rash present. Neurological:      Mental Status: She is alert.    Psychiatric:         Mood and Affect: Mood normal.          John Webb MD

## 2023-07-31 NOTE — ASSESSMENT & PLAN NOTE
Well-controlled. Continue same. We will continue to monitor.   Lab Results   Component Value Date    HGBA1C 5.3 06/21/2023

## 2023-09-05 ENCOUNTER — APPOINTMENT (OUTPATIENT)
Dept: LAB | Facility: IMAGING CENTER | Age: 83
End: 2023-09-05
Payer: COMMERCIAL

## 2023-09-05 DIAGNOSIS — N18.32 STAGE 3B CHRONIC KIDNEY DISEASE (HCC): ICD-10-CM

## 2023-09-05 DIAGNOSIS — D45 PV (POLYCYTHEMIA VERA) (HCC): ICD-10-CM

## 2023-09-05 DIAGNOSIS — D51.8 OTHER VITAMIN B12 DEFICIENCY ANEMIAS: ICD-10-CM

## 2023-09-05 LAB
ALBUMIN SERPL BCP-MCNC: 3.9 G/DL (ref 3.5–5)
ALP SERPL-CCNC: 66 U/L (ref 34–104)
ALT SERPL W P-5'-P-CCNC: 12 U/L (ref 7–52)
ANION GAP SERPL CALCULATED.3IONS-SCNC: 11 MMOL/L
AST SERPL W P-5'-P-CCNC: 19 U/L (ref 13–39)
BASOPHILS # BLD AUTO: 0.05 THOUSANDS/ÂΜL (ref 0–0.1)
BASOPHILS NFR BLD AUTO: 1 % (ref 0–1)
BILIRUB SERPL-MCNC: 0.59 MG/DL (ref 0.2–1)
BUN SERPL-MCNC: 24 MG/DL (ref 5–25)
CALCIUM SERPL-MCNC: 9.4 MG/DL (ref 8.4–10.2)
CHLORIDE SERPL-SCNC: 105 MMOL/L (ref 96–108)
CO2 SERPL-SCNC: 28 MMOL/L (ref 21–32)
CREAT SERPL-MCNC: 1.22 MG/DL (ref 0.6–1.3)
CRP SERPL QL: 1.4 MG/L
EOSINOPHIL # BLD AUTO: 0.11 THOUSAND/ÂΜL (ref 0–0.61)
EOSINOPHIL NFR BLD AUTO: 2 % (ref 0–6)
ERYTHROCYTE [DISTWIDTH] IN BLOOD BY AUTOMATED COUNT: 14.7 % (ref 11.6–15.1)
ERYTHROCYTE [SEDIMENTATION RATE] IN BLOOD: 22 MM/HOUR (ref 0–29)
GFR SERPL CREATININE-BSD FRML MDRD: 41 ML/MIN/1.73SQ M
GLUCOSE P FAST SERPL-MCNC: 94 MG/DL (ref 65–99)
HCT VFR BLD AUTO: 41.5 % (ref 34.8–46.1)
HGB BLD-MCNC: 14.2 G/DL (ref 11.5–15.4)
IMM GRANULOCYTES # BLD AUTO: 0.03 THOUSAND/UL (ref 0–0.2)
IMM GRANULOCYTES NFR BLD AUTO: 1 % (ref 0–2)
LDH SERPL-CCNC: 210 U/L (ref 140–271)
LYMPHOCYTES # BLD AUTO: 1.12 THOUSANDS/ÂΜL (ref 0.6–4.47)
LYMPHOCYTES NFR BLD AUTO: 22 % (ref 14–44)
MAGNESIUM SERPL-MCNC: 2.1 MG/DL (ref 1.9–2.7)
MCH RBC QN AUTO: 35.9 PG (ref 26.8–34.3)
MCHC RBC AUTO-ENTMCNC: 34.2 G/DL (ref 31.4–37.4)
MCV RBC AUTO: 105 FL (ref 82–98)
MONOCYTES # BLD AUTO: 0.26 THOUSAND/ÂΜL (ref 0.17–1.22)
MONOCYTES NFR BLD AUTO: 5 % (ref 4–12)
NEUTROPHILS # BLD AUTO: 3.58 THOUSANDS/ÂΜL (ref 1.85–7.62)
NEUTS SEG NFR BLD AUTO: 69 % (ref 43–75)
NRBC BLD AUTO-RTO: 0 /100 WBCS
PLATELET # BLD AUTO: 155 THOUSANDS/UL (ref 149–390)
PMV BLD AUTO: 10.3 FL (ref 8.9–12.7)
POTASSIUM SERPL-SCNC: 4.1 MMOL/L (ref 3.5–5.3)
PROT SERPL-MCNC: 6.6 G/DL (ref 6.4–8.4)
RBC # BLD AUTO: 3.96 MILLION/UL (ref 3.81–5.12)
SODIUM SERPL-SCNC: 144 MMOL/L (ref 135–147)
URATE SERPL-MCNC: 5.5 MG/DL (ref 2–7.5)
VIT B12 SERPL-MCNC: 1152 PG/ML (ref 180–914)
WBC # BLD AUTO: 5.15 THOUSAND/UL (ref 4.31–10.16)

## 2023-09-05 PROCEDURE — 85025 COMPLETE CBC W/AUTO DIFF WBC: CPT

## 2023-09-05 PROCEDURE — 36415 COLL VENOUS BLD VENIPUNCTURE: CPT

## 2023-09-05 PROCEDURE — 82607 VITAMIN B-12: CPT

## 2023-09-05 PROCEDURE — 80053 COMPREHEN METABOLIC PANEL: CPT

## 2023-09-05 PROCEDURE — 83615 LACTATE (LD) (LDH) ENZYME: CPT

## 2023-09-05 PROCEDURE — 84550 ASSAY OF BLOOD/URIC ACID: CPT

## 2023-09-05 PROCEDURE — 85652 RBC SED RATE AUTOMATED: CPT

## 2023-09-05 PROCEDURE — 83735 ASSAY OF MAGNESIUM: CPT

## 2023-09-05 PROCEDURE — 86140 C-REACTIVE PROTEIN: CPT

## 2023-09-18 ENCOUNTER — TELEPHONE (OUTPATIENT)
Dept: HEMATOLOGY ONCOLOGY | Facility: CLINIC | Age: 83
End: 2023-09-18

## 2023-09-18 NOTE — TELEPHONE ENCOUNTER
BRIANA  DR nadia BLAND   Who are you speaking with? Patient   If it is not the patient, are they listed on an active communication consent form? N/A   Is this a BRIANA or DR nadia BLAND BRIANA   Which provider is patient currently scheduled or established with? JULIO Benz   What is the original appointment date and time? 10/13/23 @ 1:30pm   At which location is the appointment scheduled to take place? [place]   Which provider is the patient transitioning care to? Dr. Kamila Pompa   What is the new appointment date and time? 12/4/23 @ 3:40pm   At which location is the new appointment scheduled to take place? [place]   What is the reason for this change?  Sarah Ojeda is leaving the practice

## 2023-09-29 PROBLEM — Z00.00 MEDICARE ANNUAL WELLNESS VISIT, SUBSEQUENT: Status: RESOLVED | Noted: 2019-06-11 | Resolved: 2023-09-29

## 2023-10-06 DIAGNOSIS — K21.9 GASTROESOPHAGEAL REFLUX DISEASE WITHOUT ESOPHAGITIS: ICD-10-CM

## 2023-10-06 DIAGNOSIS — I10 ESSENTIAL HYPERTENSION: ICD-10-CM

## 2023-10-06 RX ORDER — LOSARTAN POTASSIUM 50 MG/1
50 TABLET ORAL DAILY
Qty: 90 TABLET | Refills: 0 | Status: SHIPPED | OUTPATIENT
Start: 2023-10-06

## 2023-10-06 RX ORDER — OMEPRAZOLE 20 MG/1
20 CAPSULE, DELAYED RELEASE ORAL DAILY
Qty: 90 CAPSULE | Refills: 0 | Status: SHIPPED | OUTPATIENT
Start: 2023-10-06

## 2023-10-12 ENCOUNTER — HOSPITAL ENCOUNTER (OUTPATIENT)
Dept: NON INVASIVE DIAGNOSTICS | Facility: CLINIC | Age: 83
Discharge: HOME | End: 2023-10-12
Payer: COMMERCIAL

## 2023-10-12 DIAGNOSIS — I77.9 PAOD (PERIPHERAL ARTERIAL OCCLUSIVE DISEASE) (HCC): ICD-10-CM

## 2023-10-12 PROCEDURE — 93925 LOWER EXTREMITY STUDY: CPT | Performed by: SURGERY

## 2023-10-12 PROCEDURE — 93925 LOWER EXTREMITY STUDY: CPT

## 2023-10-12 PROCEDURE — 93923 UPR/LXTR ART STDY 3+ LVLS: CPT

## 2023-10-12 PROCEDURE — 93922 UPR/L XTREMITY ART 2 LEVELS: CPT | Performed by: SURGERY

## 2023-10-22 DIAGNOSIS — I10 ESSENTIAL HYPERTENSION: ICD-10-CM

## 2023-10-22 DIAGNOSIS — K21.9 GASTROESOPHAGEAL REFLUX DISEASE WITHOUT ESOPHAGITIS: ICD-10-CM

## 2023-10-23 RX ORDER — OMEPRAZOLE 20 MG/1
20 CAPSULE, DELAYED RELEASE ORAL DAILY
Qty: 90 CAPSULE | Refills: 0 | Status: SHIPPED | OUTPATIENT
Start: 2023-10-23

## 2023-10-23 RX ORDER — LOSARTAN POTASSIUM 50 MG/1
50 TABLET ORAL DAILY
Qty: 90 TABLET | Refills: 0 | Status: SHIPPED | OUTPATIENT
Start: 2023-10-23

## 2023-10-27 ENCOUNTER — OFFICE VISIT (OUTPATIENT)
Dept: VASCULAR SURGERY | Facility: CLINIC | Age: 83
End: 2023-10-27
Payer: COMMERCIAL

## 2023-10-27 VITALS
DIASTOLIC BLOOD PRESSURE: 68 MMHG | WEIGHT: 178 LBS | TEMPERATURE: 97.8 F | HEIGHT: 62 IN | OXYGEN SATURATION: 99 % | RESPIRATION RATE: 16 BRPM | BODY MASS INDEX: 32.76 KG/M2 | HEART RATE: 58 BPM | SYSTOLIC BLOOD PRESSURE: 138 MMHG

## 2023-10-27 DIAGNOSIS — S85.001D INJURY OF RIGHT POPLITEAL ARTERY, SUBSEQUENT ENCOUNTER: ICD-10-CM

## 2023-10-27 DIAGNOSIS — I73.9 PERIPHERAL VASCULAR DISEASE, UNSPECIFIED (HCC): Primary | ICD-10-CM

## 2023-10-27 PROCEDURE — 99213 OFFICE O/P EST LOW 20 MIN: CPT

## 2023-10-27 NOTE — PROGRESS NOTES
Assessment/Plan:    Peripheral vascular disease, unspecified ([address])  Injury to popliteal artery     82 y.o. female patient w/ PMH significant for HTN, HLD, DM2, and right above knee pop to below-knee popliteal bypass back in 2005 by Dr. Grecia Torres as a result of complications (iatrogenic popliteal artery injury) during total knee replacement c/b delayed wound healing requiring several wound washout/debridements and wound vac therapy. Patient presents today for routine follow up and results review of EDWARD done 10/12/23. -EDWARD demonstrates diffuse disease in fem-pop arteries bilaterally with a widely patent prox popliteal-distal popliteal artery bypass graft. R MILY 1.33/189/120; L MILY 1.33/152/98     Plan:  -Patent proximal to distal popliteal artery bypass graft with palpable pulses at the graft site and distally. No surgical intervention indicated at this time.   -Continue surveillance with yearly LEAD to evaluate bypass graft and for progression of disease.   -Discussed risk factor modification, including adequate glycemic and lipid control, smoking cessation, blood pressure, and lifestyle modifications.   -Continue medical optimization with daily ASA 81mg and statin therapy with LDL goal <100.   -Recommend starting a structured walking program 3-5 times/week for 30 minutes. Patient should walk until claudication symptoms occur, rest for 5-10 minutes, then continue to walk. Patient should increase distance each week. -Follow up in 1 year. -Rx for compression stockings provided today for bilateral feet swelling.   -Instructed patient to call the office with questions, concerns, or new symptoms. Diagnoses and all orders for this visit:    Peripheral vascular disease, unspecified ([address])  -     Compression Stocking  -     VAS lower limb arterial duplex, complete bilateral; Future    Injury of right popliteal artery, subsequent encounter          Subjective:      Patient ID: Mandy Cramer is a 82 y.o. female. LAZARO Golden is a 82 y.o. female patient w/ PMH significant for HTN, HLD, DM2, and right above knee pop to below-knee popliteal bypass back in 2005 by Dr. Kristan Grande as a result of complications (iatrogenic popliteal artery injury) during total knee replacement c/b delayed wound healing requiring several wound washout/debridements and wound vac therapy. Patient presents today for routine follow up and results review of EDWARD done 10/12/23. She denies any pain in the legs with walking or that wake her up at night. She reports intermittent leg cramps at night while sleeping in BLE. This does not occur every night. She denies any open ulcerations. She has a chronic callus on the bottom of her left foot and bone spur on the top of her left foot that is being taken care of by a podiatrist. She reports bilateral foot swelling that gets worse throughout the day and discoloration at the toes bilaterally. She denies use of compression stockings. She is taking aspirin 81 mg and rosuvastatin 5 mg. The following portions of the patient's history were reviewed and updated as appropriate: allergies, current medications, past family history, past medical history, past social history, past surgical history, and problem list.    Review of Systems   Constitutional: Negative. HENT: Negative. Eyes: Negative. Respiratory: Negative. Cardiovascular:  Positive for leg swelling. Gastrointestinal: Negative. Endocrine: Negative. Genitourinary: Negative. Musculoskeletal:  Positive for joint swelling. Skin: Negative. Allergic/Immunologic: Negative. Neurological: Negative. Hematological: Negative. Psychiatric/Behavioral: Negative. I have personally reviewed and made appropriate changes to the ROS that was input by the medical assistant.        Objective:      Vitals:    10/27/23 1101   BP: 138/68   Pulse: 58   Resp: 16   Temp: 97.8 °F (36.6 °C)   SpO2: 99%   Weight: 80.7 kg (178 lb) Height: 5' 2" (1.575 m)       Patient Active Problem List   Diagnosis    Polycythemia vera ([address])    Other vitamin B12 deficiency anemias    Allergic rhinitis    Angina pectoris (HCC)    Anxiety    Arteriosclerosis of coronary artery    Benign neoplasm of colon    Carotid stenosis    Cholecystitis    Colon polyp    Depression    Gastroesophageal reflux disease    Herpes zoster    Hyperlipidemia    Osteoarthritis    Morbid obesity ([address])    Lymphedema    Posterior vitreous detachment    Renal failure    Retinal detachment    Vitamin D deficiency    Hypertensive kidney disease with chronic kidney disease, stage 1-4 or unspecified chronic kidney disease    Skin infection    Psoriasis    PAOD (peripheral arterial occlusive disease) ([address])    Injury of right popliteal artery    Stage 3b chronic kidney disease (HCC)    Chronic pain of left ankle    Dermatitis    Major depressive disorder, single episode, mild (HCC)    Type 2 diabetes mellitus with left eye affected by mild nonproliferative retinopathy without macular edema, without long-term current use of insulin (HCC)    Acute left-sided low back pain with left-sided sciatica    Type 2 diabetes mellitus with chronic kidney disease ([address])    Essential hypertension    Blister of scalp with infection    Unspecified atherosclerosis of native arteries of extremities, bilateral legs (HCC)    Peripheral vascular disease, unspecified ([address])    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)    Age-related nuclear cataract, bilateral    Type 2 diabetes mellitus with diabetic peripheral angiopathy without gangrene ([address])    Cataracta    Pre-op examination    Status post popliteal-distal bypass surgery    Class 1 obesity due to excess calories with serious comorbidity and body mass index (BMI) of 33.0 to 33.9 in adult    Atherosclerosis of native coronary artery of native heart with angina pectoris ([address])    Other specified peripheral vascular diseases ([address])    Morbid (severe) obesity due to excess calories (HCC)    PV (polycythemia vera) ([address])       Past Surgical History:   Procedure Laterality Date    BREAST BIOPSY      CAROTID ENDARTARECTOMY Left     CHOLECYSTECTOMY       and 2012    HYSTERECTOMY  1981    KNEE CARTILAGE SURGERY Right 2001    OOPHORECTOMY  2010    REPLACEMENT TOTAL KNEE  2001       Family History   Problem Relation Age of Onset    Heart disease Mother     Hypertension Mother     Diabetes type II Mother     Cancer Father     Cystic fibrosis Daughter     Breast cancer Maternal Aunt 48    Breast cancer Cousin 27       Social History     Socioeconomic History    Marital status: /Civil Union     Spouse name: Not on file    Number of children: Not on file    Years of education: Not on file    Highest education level: Not on file   Occupational History    Not on file   Tobacco Use    Smoking status: Former     Packs/day: 0.25     Years: 1.00     Total pack years: 0.25     Types: Cigarettes     Quit date: 1962     Years since quittin.8     Passive exposure: Past (SPOUSE QUIT AT [address] [place] [place])    Smokeless tobacco: Never    Tobacco comments:     no passive smoke exposure   Vaping Use    Vaping Use: Never used   Substance and Sexual Activity    Alcohol use: No    Drug use: No    Sexual activity: Not Currently     Partners: Male   Other Topics Concern    Not on file   Social History Narrative    Not on file     Social Determinants of Health     Financial Resource Strain: Low Risk  (2023)    Overall Financial Resource Strain (CARDIA)     Difficulty of Paying Living Expenses: Not very hard   Food Insecurity: Not on file   Transportation Needs: No Transportation Needs (2023)    PRAPARE - Transportation     Lack of Transportation (Medical): No     Lack of Transportation (Non-Medical):  No   Physical Activity: Not on file   Stress: Not on file   Social Connections: Not on file   Intimate Partner Violence: Not on file   Housing Stability: Not on file Allergies   Allergen Reactions    Oxycodone Diarrhea, GI Intolerance, Dizziness, Headache, Irritability and Tremor     Other reaction(s):  Anxiety, Nausea and/or vomiting, Other (See Comments)  Dizziness           Current Outpatient Medications:     amoxicillin (AMOXIL) 500 mg capsule, TAKE 4 CAPS 1 HOUR BEFORE VISITS, Disp: , Rfl:     aspirin 81 mg chewable tablet, Chew 81 mg daily, Disp: , Rfl:     Cholecalciferol (Vitamin D3) 125 MCG (5000 UT) TABS, Take by mouth daily, Disp: , Rfl:     clotrimazole-betamethasone (LOTRISONE) 1-0.05 % cream, Apply topically 2 (two) times a day, Disp: 30 g, Rfl: 0    cyanocobalamin (VITAMIN B-12) 100 mcg tablet, Take 1,000 mcg by mouth, Disp: , Rfl:     erythromycin with ethanol (THERAMYCIN) 2 % external solution, APPLY TO AFFECTED AREA ON SCALP EVERY DAY AFTER SHOWERING, Disp: , Rfl:     furosemide (LASIX) 20 mg tablet, TAKE 1 TABLET (20 MG TOTAL) BY MOUTH PER WEEK, Disp: , Rfl:     hydrocortisone 2.5 % cream, APPLY TO AFFECTED AREA ON THE STOMACH TWICE A DAY, Disp: , Rfl:     hydroxyurea (HYDREA) 500 mg capsule, Take pills 2 days on 1 day off, Disp: 180 capsule, Rfl: 3    losartan (COZAAR) 50 mg tablet, TAKE 1 TABLET BY MOUTH EVERY DAY, Disp: 90 tablet, Rfl: 0    metoprolol tartrate (LOPRESSOR) 50 mg tablet, TAKE 1/2 TABLET BY MOUTH EVERY 12 HOURS, Disp: 90 tablet, Rfl: 1    omeprazole (PriLOSEC) 20 mg delayed release capsule, TAKE 1 CAPSULE BY MOUTH EVERY DAY, Disp: 90 capsule, Rfl: 0    rosuvastatin (CRESTOR) 5 mg tablet, Take 1 tablet (5 mg total) by mouth daily, Disp: 90 tablet, Rfl: 0    sertraline (ZOLOFT) 50 mg tablet, TAKE 1 TABLET BY MOUTH EVERY DAY, Disp: 90 tablet, Rfl: 1    Sodium Fluoride 5000 PPM 1.1 % GEL, BRUSH TWICE DAILY AFTER BRUSHING SPIT OUT BUT DO NOT RINSE, Disp: , Rfl:       /68   Pulse 58   Temp 97.8 °F (36.6 °C)   Resp 16   Ht 5' 2" (1.575 m)   Wt 80.7 kg (178 lb)   LMP  (LMP Unknown)   SpO2 99%   BMI 32.56 kg/m²          Physical Exam  Vitals and nursing note reviewed. Constitutional:       Appearance: Normal appearance. HENT:      Head: Normocephalic and atraumatic. Neck:      Vascular: No carotid bruit. Cardiovascular:      Rate and Rhythm: Normal rate and regular rhythm. Pulses:           Carotid pulses are 2+ on the right side and 2+ on the left side. Radial pulses are 2+ on the right side and 2+ on the left side. Popliteal pulses are 2+ on the right side and 2+ on the left side. Dorsalis pedis pulses are 1+ on the right side and 1+ on the left side. Posterior tibial pulses are 1+ on the right side and 1+ on the left side. Heart sounds: Normal heart sounds. Comments: No carotid bruit   Pulmonary:      Effort: Pulmonary effort is normal. No respiratory distress. Breath sounds: Normal breath sounds. Musculoskeletal:         General: No swelling. Normal range of motion. Cervical back: Normal range of motion and neck supple. Skin:     General: Skin is warm. Capillary Refill: Capillary refill takes less than 2 seconds. Neurological:      General: No focal deficit present. Mental Status: She is alert and oriented to person, place, and time. Psychiatric:         Mood and Affect: Mood normal.         Behavior: Behavior normal.         Nica Coffman PA-C  [facility]  [phone]    I have spent a total time of 20 minutes on 10/27/23 in caring for this patient including Diagnostic results, Prognosis, Risks and benefits of tx options, Instructions for management, Patient and family education, Importance of tx compliance, Risk factor reductions, Impressions, Counseling / Coordination of care, Documenting in the medical record, Reviewing / ordering tests, medicine, procedures  , and Obtaining or reviewing history  .

## 2023-10-27 NOTE — ASSESSMENT & PLAN NOTE
82 y.o. female patient w/ PMH significant for HTN, HLD, DM2, and right above knee pop to below-knee popliteal bypass back in 2005 by Dr. Sera Villalta as a result of complications (iatrogenic popliteal artery injury) during total knee replacement c/b delayed wound healing requiring wound vac therapy. Patient presents today for routine follow up and results review of EDWARD done 10/12/23. -EDWARD demonstrates diffuse disease in fem-pop arteries bilaterally with a widely patent prox popliteal-distal popliteal artery bypass graft. R MILY 1.33/189/120; L MILY 1.33/152/98     Plan:  -Patent proximal to distal popliteal artery bypass graft with palpable pulses at the graft site and distally. No surgical intervention indicated at this time.   -Continue surveillance with yearly LEAD to evaluate bypass graft and for progression of disease.   -Discussed risk factor modification, including adequate glycemic and lipid control, smoking cessation, blood pressure, and lifestyle modifications.   -Continue medical optimization with daily ASA 81mg and statin therapy with LDL goal <100.   -Recommend starting a structured walking program 3-5 times/week for 30 minutes. Patient should walk until claudication symptoms occur, rest for 5-10 minutes, then continue to walk. Patient should increase distance each week. -Follow up in 1 year. -Rx for compression stockings provided today for bilateral feet swelling.   -Instructed patient to call the office with questions, concerns, or new symptoms.

## 2023-10-27 NOTE — PATIENT INSTRUCTIONS
-Educated patient on pathophysiology of peripheral arterial disease, available treatment options, and indications for treatment. -Repeat leg ultrasound in 1 year.   -Discussed risk factor modification, including adequate glycemic and lipid control, smoking cessation, blood pressure, and lifestyle modifications.   -Continue medical optimization with daily ASA 81mg and statin therapy with LDL goal <100.   -Recommend starting a structured walking program 3-5 times/week for 30 minutes. Patient should walk until claudication symptoms occur, rest for 5-10 minutes, then continue to walk. Patient should increase distance each week. -Follow up in 1 year   -Instructed patient to call the office with questions, concerns, or new symptoms. Foot swelling  -Recommend conservative measures with use of daily compression hose (15-20 mmHg), lower extremity elevation, regular exercise, and diligent skin care. -Script for compression provided today.  Please wear daily (put on in AM , take off in PM before bed)

## 2023-10-30 DIAGNOSIS — F32.0 CURRENT MILD EPISODE OF MAJOR DEPRESSIVE DISORDER, UNSPECIFIED WHETHER RECURRENT (HCC): ICD-10-CM

## 2023-11-09 DIAGNOSIS — E78.49 OTHER HYPERLIPIDEMIA: ICD-10-CM

## 2023-11-09 RX ORDER — ROSUVASTATIN CALCIUM 5 MG/1
5 TABLET, COATED ORAL DAILY
Qty: 90 TABLET | Refills: 1 | Status: SHIPPED | OUTPATIENT
Start: 2023-11-09

## 2023-11-21 ENCOUNTER — APPOINTMENT (OUTPATIENT)
Dept: LAB | Facility: IMAGING CENTER | Age: 83
End: 2023-11-21
Payer: COMMERCIAL

## 2023-11-21 DIAGNOSIS — E11.3292 TYPE 2 DIABETES MELLITUS WITH LEFT EYE AFFECTED BY MILD NONPROLIFERATIVE RETINOPATHY WITHOUT MACULAR EDEMA, WITHOUT LONG-TERM CURRENT USE OF INSULIN (HCC): ICD-10-CM

## 2023-11-21 LAB
ALBUMIN SERPL BCP-MCNC: 4.1 G/DL (ref 3.5–5)
ALP SERPL-CCNC: 64 U/L (ref 34–104)
ALT SERPL W P-5'-P-CCNC: 15 U/L (ref 7–52)
ANION GAP SERPL CALCULATED.3IONS-SCNC: 7 MMOL/L
AST SERPL W P-5'-P-CCNC: 21 U/L (ref 13–39)
BASOPHILS # BLD AUTO: 0.05 THOUSANDS/ÂΜL (ref 0–0.1)
BASOPHILS NFR BLD AUTO: 1 % (ref 0–1)
BILIRUB SERPL-MCNC: 0.53 MG/DL (ref 0.2–1)
BUN SERPL-MCNC: 28 MG/DL (ref 5–25)
CALCIUM SERPL-MCNC: 9.5 MG/DL (ref 8.4–10.2)
CHLORIDE SERPL-SCNC: 106 MMOL/L (ref 96–108)
CHOLEST SERPL-MCNC: 137 MG/DL
CO2 SERPL-SCNC: 30 MMOL/L (ref 21–32)
CREAT SERPL-MCNC: 1.27 MG/DL (ref 0.6–1.3)
EOSINOPHIL # BLD AUTO: 0.11 THOUSAND/ÂΜL (ref 0–0.61)
EOSINOPHIL NFR BLD AUTO: 2 % (ref 0–6)
ERYTHROCYTE [DISTWIDTH] IN BLOOD BY AUTOMATED COUNT: 14.7 % (ref 11.6–15.1)
EST. AVERAGE GLUCOSE BLD GHB EST-MCNC: 111 MG/DL
GFR SERPL CREATININE-BSD FRML MDRD: 39 ML/MIN/1.73SQ M
GLUCOSE P FAST SERPL-MCNC: 86 MG/DL (ref 65–99)
HBA1C MFR BLD: 5.5 %
HCT VFR BLD AUTO: 44.1 % (ref 34.8–46.1)
HDLC SERPL-MCNC: 51 MG/DL
HGB BLD-MCNC: 14.4 G/DL (ref 11.5–15.4)
IMM GRANULOCYTES # BLD AUTO: 0.02 THOUSAND/UL (ref 0–0.2)
IMM GRANULOCYTES NFR BLD AUTO: 0 % (ref 0–2)
LDLC SERPL CALC-MCNC: 61 MG/DL (ref 0–100)
LYMPHOCYTES # BLD AUTO: 1.06 THOUSANDS/ÂΜL (ref 0.6–4.47)
LYMPHOCYTES NFR BLD AUTO: 19 % (ref 14–44)
MCH RBC QN AUTO: 35.6 PG (ref 26.8–34.3)
MCHC RBC AUTO-ENTMCNC: 32.7 G/DL (ref 31.4–37.4)
MCV RBC AUTO: 109 FL (ref 82–98)
MONOCYTES # BLD AUTO: 0.29 THOUSAND/ÂΜL (ref 0.17–1.22)
MONOCYTES NFR BLD AUTO: 5 % (ref 4–12)
NEUTROPHILS # BLD AUTO: 4.02 THOUSANDS/ÂΜL (ref 1.85–7.62)
NEUTS SEG NFR BLD AUTO: 73 % (ref 43–75)
NRBC BLD AUTO-RTO: 0 /100 WBCS
PLATELET # BLD AUTO: 146 THOUSANDS/UL (ref 149–390)
PMV BLD AUTO: 10.3 FL (ref 8.9–12.7)
POTASSIUM SERPL-SCNC: 4.7 MMOL/L (ref 3.5–5.3)
PROT SERPL-MCNC: 6.8 G/DL (ref 6.4–8.4)
RBC # BLD AUTO: 4.05 MILLION/UL (ref 3.81–5.12)
SODIUM SERPL-SCNC: 143 MMOL/L (ref 135–147)
TRIGL SERPL-MCNC: 126 MG/DL
TSH SERPL DL<=0.05 MIU/L-ACNC: 2.08 UIU/ML (ref 0.45–4.5)
WBC # BLD AUTO: 5.55 THOUSAND/UL (ref 4.31–10.16)

## 2023-11-21 PROCEDURE — 82043 UR ALBUMIN QUANTITATIVE: CPT

## 2023-11-21 PROCEDURE — 83036 HEMOGLOBIN GLYCOSYLATED A1C: CPT

## 2023-11-21 PROCEDURE — 85025 COMPLETE CBC W/AUTO DIFF WBC: CPT

## 2023-11-21 PROCEDURE — 82570 ASSAY OF URINE CREATININE: CPT

## 2023-11-21 PROCEDURE — 84443 ASSAY THYROID STIM HORMONE: CPT

## 2023-11-21 PROCEDURE — 80053 COMPREHEN METABOLIC PANEL: CPT

## 2023-11-21 PROCEDURE — 36415 COLL VENOUS BLD VENIPUNCTURE: CPT

## 2023-11-21 PROCEDURE — 80061 LIPID PANEL: CPT

## 2023-11-22 ENCOUNTER — APPOINTMENT (OUTPATIENT)
Dept: LAB | Facility: IMAGING CENTER | Age: 83
End: 2023-11-22

## 2023-11-22 LAB
CREAT UR-MCNC: 70.1 MG/DL
MICROALBUMIN UR-MCNC: <7 MG/L
MICROALBUMIN/CREAT 24H UR: <10 MG/G CREATININE (ref 0–30)

## 2023-11-27 ENCOUNTER — TELEPHONE (OUTPATIENT)
Dept: FAMILY MEDICINE CLINIC | Facility: CLINIC | Age: 83
End: 2023-11-27

## 2023-11-27 NOTE — TELEPHONE ENCOUNTER
----- Message from Karime Garcia MD sent at 11/27/2023  7:05 AM EST -----  Blood work showed GFR low but stable.   All the rest of blood work came back normal.

## 2023-12-01 DIAGNOSIS — I10 ESSENTIAL HYPERTENSION: ICD-10-CM

## 2023-12-01 RX ORDER — METOPROLOL TARTRATE 50 MG/1
TABLET, FILM COATED ORAL
Qty: 90 TABLET | Refills: 1 | Status: SHIPPED | OUTPATIENT
Start: 2023-12-01

## 2023-12-04 ENCOUNTER — OFFICE VISIT (OUTPATIENT)
Dept: HEMATOLOGY ONCOLOGY | Facility: CLINIC | Age: 83
End: 2023-12-04
Payer: COMMERCIAL

## 2023-12-04 VITALS
HEART RATE: 56 BPM | OXYGEN SATURATION: 92 % | TEMPERATURE: 97.2 F | DIASTOLIC BLOOD PRESSURE: 70 MMHG | WEIGHT: 175 LBS | RESPIRATION RATE: 18 BRPM | BODY MASS INDEX: 32.2 KG/M2 | SYSTOLIC BLOOD PRESSURE: 132 MMHG | HEIGHT: 62 IN

## 2023-12-04 DIAGNOSIS — D45 PV (POLYCYTHEMIA VERA) (HCC): ICD-10-CM

## 2023-12-04 DIAGNOSIS — D45 PV (POLYCYTHEMIA VERA) (HCC): Primary | ICD-10-CM

## 2023-12-04 PROCEDURE — 99214 OFFICE O/P EST MOD 30 MIN: CPT | Performed by: INTERNAL MEDICINE

## 2023-12-04 RX ORDER — HYDROXYUREA 500 MG/1
CAPSULE ORAL
Qty: 180 CAPSULE | Refills: 3 | Status: SHIPPED | OUTPATIENT
Start: 2023-12-04 | End: 2023-12-05

## 2023-12-04 NOTE — PROGRESS NOTES
Hematology/Oncology Outpatient Follow-up  Binta Batista 82 y.o. female 1940 [id number]    Date:  12/4/2023        Assessment and Plan:  1. PV (polycythemia vera) (HCC)  The patient seems to tolerate the Hydrea at the current dose of 500 mg daily for 2 days on 1 day of relatively well which is keeping her hematocrit at the goal of less than 45%. She was encouraged to continue with baby aspirin daily. We will see her again in about 4 to 6 months from now for close follow-up. - CBC and differential; Future  - Comprehensive metabolic panel; Future  - hydroxyurea (HYDREA) 500 mg capsule; Take pills 2 days on 1 day off  Dispense: 180 capsule; Refill: 3        HPI:  The patient came today for a follow-up visit. She continues to be on the Hydrea at 500 mg daily 2 days on and 1 day off. Recent CBC from 11/21/2023 showed hemoglobin of 14.4 with white cell count of 5.5. MCV of 109 with platelet count of 704. White cell differential was normal.  Creatinine 1.2 with normal calcium and liver enzymes. Oncology History Overview Note   Patient had extensive workup for further evaluation of her elevated H&H. 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 She was started on Hydrea 500 mg once a day February 10, 2016. The dose was increased to 500 mg twice a day which resulted in significant drop of her platelet count. We did then decrease the dose and put on hold April 4, 2016. Patient had 2 phlebotomies while she was off the Hydrea. She is currently on Hydrea 500 mg once a day 2 days on and 1 day off and tolerating this dose well. Polycythemia vera ([address])   2/3/2016 Initial Diagnosis    PV (polycythemia vera) (HCC)         Interval history:    ROS: Review of Systems   Constitutional:  Positive for fatigue. Negative for chills and fever. HENT:  Positive for hearing loss. Negative for ear pain and sore throat.     Eyes:  Negative for pain and visual disturbance. Respiratory:  Negative for cough and shortness of breath. Cardiovascular:  Negative for chest pain and palpitations. Gastrointestinal:  Negative for abdominal pain and vomiting. Genitourinary:  Negative for dysuria and hematuria. Musculoskeletal:  Positive for arthralgias. Negative for back pain. Skin:  Negative for color change and rash. Neurological:  Positive for numbness. Negative for seizures and syncope. All other systems reviewed and are negative. Past Medical History:   Diagnosis Date    CAD (coronary artery disease)        Past Surgical History:   Procedure Laterality Date    BREAST BIOPSY      CAROTID ENDARTARECTOMY Left     CHOLECYSTECTOMY       and     [facility] SURGERY Right 2001    OOPHORECTOMY  2010    REPLACEMENT TOTAL KNEE         Social History     Socioeconomic History    Marital status: /Civil Union     Spouse name: None    Number of children: None    Years of education: None    Highest education level: None   Occupational History    None   Tobacco Use    Smoking status: Former     Packs/day: 0.25     Years: 1.00     Total pack years: 0.25     Types: Cigarettes     Quit date: 1962     Years since quittin.9     Passive exposure: Past (SPOUSE QUIT AT [address] [place]    Smokeless tobacco: Never    Tobacco comments:     no passive smoke exposure   Vaping Use    Vaping Use: Never used   Substance and Sexual Activity    Alcohol use: No    Drug use: No    Sexual activity: Not Currently     Partners: Male   Other Topics Concern    None   Social History Narrative    None     Social Determinants of Health     Financial Resource Strain: Low Risk  (2023)    Overall Financial Resource Strain (CARDIA)     Difficulty of Paying Living Expenses: Not very hard   Food Insecurity: Not on file   Transportation Needs: No Transportation Needs (2023)    PRAPARE - Transportation     Lack of Transportation (Medical):  No Lack of Transportation (Non-Medical): No   Physical Activity: Not on file   Stress: Not on file   Social Connections: Not on file   Intimate Partner Violence: Not on file   Housing Stability: Not on file       Family History   Problem Relation Age of Onset    Heart disease Mother     Hypertension Mother     Diabetes type II Mother     Cancer Father     Cystic fibrosis Daughter     Breast cancer Maternal Aunt 48    Breast cancer Cousin 30       Allergies   Allergen Reactions    Oxycodone Diarrhea, GI Intolerance, Dizziness, Headache, Irritability and Tremor     Other reaction(s):  Anxiety, Nausea and/or vomiting, Other (See Comments)  Dizziness           Current Outpatient Medications:     amoxicillin (AMOXIL) 500 mg capsule, TAKE 4 CAPS 1 HOUR BEFORE VISITS, Disp: , Rfl:     aspirin 81 mg chewable tablet, Chew 81 mg daily, Disp: , Rfl:     Cholecalciferol (Vitamin D3) 125 MCG (5000 UT) TABS, Take by mouth daily, Disp: , Rfl:     clotrimazole-betamethasone (LOTRISONE) 1-0.05 % cream, Apply topically 2 (two) times a day, Disp: 30 g, Rfl: 0    cyanocobalamin (VITAMIN B-12) 100 mcg tablet, Take 1,000 mcg by mouth, Disp: , Rfl:     erythromycin with ethanol (THERAMYCIN) 2 % external solution, APPLY TO AFFECTED AREA ON SCALP EVERY DAY AFTER SHOWERING, Disp: , Rfl:     furosemide (LASIX) 20 mg tablet, TAKE 1 TABLET (20 MG TOTAL) BY MOUTH PER WEEK, Disp: , Rfl:     hydrocortisone 2.5 % cream, APPLY TO AFFECTED AREA ON THE STOMACH TWICE A DAY, Disp: , Rfl:     hydroxyurea (HYDREA) 500 mg capsule, Take pills 2 days on 1 day off, Disp: 180 capsule, Rfl: 3    losartan (COZAAR) 50 mg tablet, TAKE 1 TABLET BY MOUTH EVERY DAY, Disp: 90 tablet, Rfl: 0    metoprolol tartrate (LOPRESSOR) 50 mg tablet, TAKE 1/2 TABLET BY MOUTH EVERY 12 HOURS, Disp: 90 tablet, Rfl: 1    omeprazole (PriLOSEC) 20 mg delayed release capsule, TAKE 1 CAPSULE BY MOUTH EVERY DAY, Disp: 90 capsule, Rfl: 0    rosuvastatin (CRESTOR) 5 mg tablet, TAKE 1 TABLET (5 MG TOTAL) BY MOUTH DAILY. , Disp: 90 tablet, Rfl: 1    sertraline (ZOLOFT) 50 mg tablet, TAKE 1 TABLET BY MOUTH EVERY DAY, Disp: 90 tablet, Rfl: 1    Sodium Fluoride 5000 PPM 1.1 % GEL, BRUSH TWICE DAILY AFTER BRUSHING SPIT OUT BUT DO NOT RINSE, Disp: , Rfl:       Physical Exam:  /70 (BP Location: Right arm, Patient Position: Sitting, Cuff Size: Adult)   Pulse 56   Temp (!) 97.2 °F (36.2 °C)   Resp 18   Ht 5' 2" (1.575 m)   Wt 79.4 kg (175 lb)   LMP  (LMP Unknown)   SpO2 92%   BMI 32.01 kg/m²     Physical Exam  Constitutional:       General: She is not in acute distress. Appearance: She is well-developed. She is not diaphoretic. HENT:      Head: Normocephalic and atraumatic. Nose: Nose normal.   Eyes:      General: No scleral icterus. Right eye: No discharge. Left eye: No discharge. Conjunctiva/sclera: Conjunctivae normal.      Pupils: Pupils are equal, round, and reactive to light. Neck:      Thyroid: No thyromegaly. Vascular: No JVD. Trachea: No tracheal deviation. Cardiovascular:      Rate and Rhythm: Normal rate and regular rhythm. Heart sounds: Murmur heard. No friction rub. Pulmonary:      Effort: Pulmonary effort is normal. No respiratory distress. Breath sounds: Normal breath sounds. No stridor. No wheezing or rales. Chest:      Chest wall: No tenderness. Abdominal:      General: There is no distension. Palpations: Abdomen is soft. There is no hepatomegaly or splenomegaly. Tenderness: There is no abdominal tenderness. There is no guarding or rebound. Musculoskeletal:         General: No tenderness or deformity. Normal range of motion. Cervical back: Normal range of motion and neck supple. Lymphadenopathy:      Cervical: No cervical adenopathy. Skin:     General: Skin is warm and dry. Coloration: Skin is not pale. Findings: No erythema or rash.    Neurological:      Mental Status: She is alert and oriented to person, place, and time. Cranial Nerves: No cranial nerve deficit. Coordination: Coordination normal.      Deep Tendon Reflexes: Reflexes are normal and symmetric. Psychiatric:         Behavior: Behavior normal.         Thought Content: Thought content normal.         Judgment: Judgment normal.           Labs:  Lab Results   Component Value Date    WBC 5.55 11/21/2023    HGB 14.4 11/21/2023    HCT 44.1 11/21/2023     (H) 11/21/2023     (L) 11/21/2023     Lab Results   Component Value Date    K 4.7 11/21/2023     11/21/2023    CO2 30 11/21/2023    BUN 28 (H) 11/21/2023    CREATININE 1.27 11/21/2023    GLUF 86 11/21/2023    CALCIUM 9.5 11/21/2023    CORRECTEDCA 9.4 09/15/2022    AST 21 11/21/2023    ALT 15 11/21/2023    ALKPHOS 64 11/21/2023    EGFR 39 11/21/2023     Lab Results   Component Value Date    TSH 1.64 04/26/2018       Patient voiced understanding and agreement in the above discussion. Aware to contact our office with questions/symptoms in the interim.

## 2023-12-05 RX ORDER — HYDROXYUREA 500 MG/1
CAPSULE ORAL
Qty: 180 CAPSULE | Refills: 3 | Status: SHIPPED | OUTPATIENT
Start: 2023-12-05

## 2024-01-31 ENCOUNTER — OFFICE VISIT (OUTPATIENT)
Dept: FAMILY MEDICINE CLINIC | Facility: CLINIC | Age: 84
End: 2024-01-31
Payer: COMMERCIAL

## 2024-01-31 VITALS
OXYGEN SATURATION: 100 % | TEMPERATURE: 97.8 F | BODY MASS INDEX: 32.02 KG/M2 | HEIGHT: 62 IN | RESPIRATION RATE: 16 BRPM | HEART RATE: 56 BPM | DIASTOLIC BLOOD PRESSURE: 62 MMHG | WEIGHT: 174 LBS | SYSTOLIC BLOOD PRESSURE: 128 MMHG

## 2024-01-31 DIAGNOSIS — E78.49 OTHER HYPERLIPIDEMIA: ICD-10-CM

## 2024-01-31 DIAGNOSIS — N18.32 TYPE 2 DIABETES MELLITUS WITH STAGE 3B CHRONIC KIDNEY DISEASE, WITHOUT LONG-TERM CURRENT USE OF INSULIN (HCC): ICD-10-CM

## 2024-01-31 DIAGNOSIS — I10 ESSENTIAL HYPERTENSION: Primary | ICD-10-CM

## 2024-01-31 DIAGNOSIS — I25.119 ATHEROSCLEROSIS OF NATIVE CORONARY ARTERY OF NATIVE HEART WITH ANGINA PECTORIS (HCC): ICD-10-CM

## 2024-01-31 DIAGNOSIS — E11.51 TYPE 2 DIABETES MELLITUS WITH DIABETIC PERIPHERAL ANGIOPATHY WITHOUT GANGRENE, WITHOUT LONG-TERM CURRENT USE OF INSULIN (HCC): ICD-10-CM

## 2024-01-31 DIAGNOSIS — D45 PV (POLYCYTHEMIA VERA) (HCC): ICD-10-CM

## 2024-01-31 DIAGNOSIS — E11.36 TYPE 2 DIABETES MELLITUS WITH DIABETIC CATARACT, WITHOUT LONG-TERM CURRENT USE OF INSULIN (HCC): ICD-10-CM

## 2024-01-31 DIAGNOSIS — E11.22 TYPE 2 DIABETES MELLITUS WITH STAGE 3B CHRONIC KIDNEY DISEASE, WITHOUT LONG-TERM CURRENT USE OF INSULIN (HCC): ICD-10-CM

## 2024-01-31 DIAGNOSIS — E66.01 MORBID (SEVERE) OBESITY DUE TO EXCESS CALORIES (HCC): ICD-10-CM

## 2024-01-31 DIAGNOSIS — F32.0 MAJOR DEPRESSIVE DISORDER, SINGLE EPISODE, MILD (HCC): ICD-10-CM

## 2024-01-31 DIAGNOSIS — K21.9 GASTROESOPHAGEAL REFLUX DISEASE WITHOUT ESOPHAGITIS: ICD-10-CM

## 2024-01-31 DIAGNOSIS — I74.3 EMBOLISM AND THROMBOSIS OF ARTERIES OF THE LOWER EXTREMITIES (HCC): ICD-10-CM

## 2024-01-31 DIAGNOSIS — N18.32 STAGE 3B CHRONIC KIDNEY DISEASE (HCC): ICD-10-CM

## 2024-01-31 DIAGNOSIS — I73.9 PERIPHERAL VASCULAR DISEASE, UNSPECIFIED (HCC): ICD-10-CM

## 2024-01-31 DIAGNOSIS — E11.3292 TYPE 2 DIABETES MELLITUS WITH LEFT EYE AFFECTED BY MILD NONPROLIFERATIVE RETINOPATHY WITHOUT MACULAR EDEMA, WITHOUT LONG-TERM CURRENT USE OF INSULIN (HCC): ICD-10-CM

## 2024-01-31 PROCEDURE — 3078F DIAST BP <80 MM HG: CPT | Performed by: FAMILY MEDICINE

## 2024-01-31 PROCEDURE — 1160F RVW MEDS BY RX/DR IN RCRD: CPT | Performed by: FAMILY MEDICINE

## 2024-01-31 PROCEDURE — 1159F MED LIST DOCD IN RCRD: CPT | Performed by: FAMILY MEDICINE

## 2024-01-31 PROCEDURE — 99214 OFFICE O/P EST MOD 30 MIN: CPT | Performed by: FAMILY MEDICINE

## 2024-01-31 PROCEDURE — 3074F SYST BP LT 130 MM HG: CPT | Performed by: FAMILY MEDICINE

## 2024-01-31 NOTE — ASSESSMENT & PLAN NOTE
Continue to encourage oral hydration and good diabetic control.  Continue to monitor.  Lab Results   Component Value Date    HGBA1C 5.5 11/21/2023

## 2024-01-31 NOTE — ASSESSMENT & PLAN NOTE
A1c is well-controlled.  Continue to monitor.  Lab Results   Component Value Date    HGBA1C 5.5 11/21/2023

## 2024-01-31 NOTE — PROGRESS NOTES
Name: Mariah Irwin      : 1940      MRN: [id number]  Encounter Provider: Valeria Chen MD  Encounter Date: 2024   Encounter department: [facility]    Assessment & Plan     1. Essential hypertension  Assessment & Plan:  Controlled.  Continue same.  Will continue to monitor.    Orders:  -     CBC and differential; Future  -     Comprehensive metabolic panel; Future  -     Lipid Panel with Direct LDL reflex; Future  -     TSH, 3rd generation with Free T4 reflex; Future    2. Other hyperlipidemia  Assessment & Plan:  Well-controlled.  Continue on Crestor.  Continue to monitor.    Orders:  -     CBC and differential; Future  -     Comprehensive metabolic panel; Future  -     Lipid Panel with Direct LDL reflex; Future  -     TSH, 3rd generation with Free T4 reflex; Future    3. Embolism and thrombosis of arteries of the lower extremities (HCC)    4. Major depressive disorder, single episode, mild (HCC)  Assessment & Plan:  Stable.  Continue same.  Will continue to monitor.      5. Peripheral vascular disease, unspecified (HCC)  Assessment & Plan:  Asymptomatic.  Can follow-up with vascular surgeon.      6. PV (polycythemia vera) (HCC)  Assessment & Plan:  Continue to follow-up with oncologist.      7. Morbid (severe) obesity due to excess calories (HCC)    8. Type 2 diabetes mellitus with left eye affected by mild nonproliferative retinopathy without macular edema, without long-term current use of insulin (HCC)  Assessment & Plan:  Well-controlled.  Continue same.  Will continue to monitor.  Lab Results   Component Value Date    HGBA1C 5.5 2023     Orders:  -     Albumin / creatinine urine ratio; Future; Expected date: 2024  -     Comprehensive metabolic panel; Future; Expected date: 2024  -     Hemoglobin A1C; Future; Expected date: 2024  -     CBC and differential; Future; Expected date: 2024  -     Lipid Panel with Direct LDL reflex; Future;  Expected date: 01/31/2024  -     TSH, 3rd generation with Free T4 reflex; Future; Expected date: 01/31/2024    9. Atherosclerosis of native coronary artery of native heart with angina pectoris (HCC)  Assessment & Plan:  Stable.  Asymptomatic.  Continue same.  Continue to monitor.      10. Stage 3b chronic kidney disease (HCC)  Assessment & Plan:  Lab Results   Component Value Date    EGFR 39 11/21/2023    EGFR 41 09/05/2023    EGFR 34 06/21/2023    CREATININE 1.27 11/21/2023    CREATININE 1.22 09/05/2023    CREATININE 1.43 (H) 06/21/2023   GFR is low but stable.  Continue good oral hydration.  Continue to follow-up with nephrologist.  Continue to monitor.      11. Gastroesophageal reflux disease without esophagitis  Assessment & Plan:  Stable.  Continue on omeprazole.  Will continue to monitor.      12. Type 2 diabetes mellitus with diabetic peripheral angiopathy without gangrene, without long-term current use of insulin ([facility])  Assessment & Plan:  A1c is well-controlled.  Continue to monitor.  Lab Results   Component Value Date    HGBA1C 5.5 11/21/2023       13. Type 2 diabetes mellitus with diabetic cataract, without long-term current use of insulin ([facility])  Assessment & Plan:  Continue to follow-up with ophthalmologist.  Lab Results   Component Value Date    HGBA1C 5.5 11/21/2023       14. Type 2 diabetes mellitus with stage 3b chronic kidney disease, without long-term current use of insulin ([facility])  Assessment & Plan:  Continue to encourage oral hydration and good diabetic control.  Continue to monitor.  Lab Results   Component Value Date    HGBA1C 5.5 11/21/2023       15. BMI 31.0-31.9,adult           Subjective     She is here today for follow-up multiple medical problems.  Has been taking her medications.  Denies any side effect from medications.  Her blood work from last November came back stable.  She denies any complaint today.    Hyperlipidemia  Pertinent negatives include no chest pain or shortness of breath.    Hypertension  Pertinent negatives include no chest pain, headaches, palpitations or shortness of breath.     Review of Systems   Constitutional:  Negative for chills and fever.   HENT:  Negative for trouble swallowing.    Eyes:  Negative for visual disturbance.   Respiratory:  Negative for cough and shortness of breath.    Cardiovascular:  Negative for chest pain, palpitations and leg swelling.   Gastrointestinal:  Negative for abdominal pain, constipation and diarrhea.   Endocrine: Negative for cold intolerance and heat intolerance.   Genitourinary:  Negative for difficulty urinating and dysuria.   Musculoskeletal:  Negative for gait problem.   Skin:  Negative for rash.   Neurological:  Negative for dizziness, tremors, seizures and headaches.   Hematological:  Negative for adenopathy.   Psychiatric/Behavioral:  Negative for behavioral problems.        Past Medical History:   Diagnosis Date   • CAD (coronary artery disease)      Past Surgical History:   Procedure Laterality Date   • BREAST BIOPSY     • CAROTID ENDARTARECTOMY Left    • CHOLECYSTECTOMY       and    • HYSTERECTOMY     • KNEE CARTILAGE SURGERY Right    • OOPHORECTOMY     • REPLACEMENT TOTAL KNEE       Family History   Problem Relation Age of Onset   • Heart disease Mother    • Hypertension Mother    • Diabetes type II Mother    • Cancer Father    • Cystic fibrosis Daughter    • Breast cancer Maternal Aunt 53   • Breast cancer Cousin 30     Social History     Socioeconomic History   • Marital status: /Civil Union     Spouse name: None   • Number of children: None   • Years of education: None   • Highest education level: None   Occupational History   • None   Tobacco Use   • Smoking status: Former     Current packs/day: 0.00     Average packs/day: 0.3 packs/day for 1 year (0.3 ttl pk-yrs)     Types: Cigarettes     Start date: 1961     Quit date: 1962     Years since quittin.1     Passive exposure: Past (SPOUSE  QUIT AT THE SAME TIME)   • Smokeless tobacco: Never   • Tobacco comments:     no passive smoke exposure   Vaping Use   • Vaping status: Never Used   Substance and Sexual Activity   • Alcohol use: No   • Drug use: No   • Sexual activity: Not Currently     Partners: Male   Other Topics Concern   • None   Social History Narrative   • None     Social Determinants of Health     Financial Resource Strain: Low Risk  (7/28/2023)    Overall Financial Resource Strain (CARDIA)    • Difficulty of Paying Living Expenses: Not very hard   Food Insecurity: Not on file   Transportation Needs: No Transportation Needs (7/28/2023)    PRAPARE - Transportation    • Lack of Transportation (Medical): No    • Lack of Transportation (Non-Medical): No   Physical Activity: Not on file   Stress: Not on file   Social Connections: Not on file   Intimate Partner Violence: Not on file   Housing Stability: Not on file     Current Outpatient Medications on File Prior to Visit   Medication Sig   • amoxicillin (AMOXIL) 500 mg capsule TAKE 4 CAPS 1 HOUR BEFORE VISITS   • aspirin 81 mg chewable tablet Chew 81 mg daily   • Cholecalciferol (Vitamin D3) 125 MCG (5000 UT) TABS Take by mouth daily   • clotrimazole-betamethasone (LOTRISONE) 1-0.05 % cream Apply topically 2 (two) times a day   • cyanocobalamin (VITAMIN B-12) 100 mcg tablet Take 1,000 mcg by mouth   • erythromycin with ethanol (THERAMYCIN) 2 % external solution APPLY TO AFFECTED AREA ON SCALP EVERY DAY AFTER SHOWERING   • furosemide (LASIX) 20 mg tablet TAKE 1 TABLET (20 MG TOTAL) BY MOUTH PER WEEK   • hydrocortisone 2.5 % cream APPLY TO AFFECTED AREA ON THE STOMACH TWICE A DAY   • hydroxyurea (HYDREA) 500 mg capsule TAKE PILLS 2 DAYS ON 1 DAY OFF   • losartan (COZAAR) 50 mg tablet TAKE 1 TABLET BY MOUTH EVERY DAY   • metoprolol tartrate (LOPRESSOR) 50 mg tablet TAKE 1/2 TABLET BY MOUTH EVERY 12 HOURS   • omeprazole (PriLOSEC) 20 mg delayed release capsule TAKE 1 CAPSULE BY MOUTH EVERY DAY   •  "rosuvastatin (CRESTOR) 5 mg tablet TAKE 1 TABLET (5 MG TOTAL) BY MOUTH DAILY.   • sertraline (ZOLOFT) 50 mg tablet TAKE 1 TABLET BY MOUTH EVERY DAY   • Sodium Fluoride 5000 PPM 1.1 % GEL BRUSH TWICE DAILY AFTER BRUSHING SPIT OUT BUT DO NOT RINSE     Allergies   Allergen Reactions   • Oxycodone Diarrhea, GI Intolerance, Dizziness, Headache, Irritability and Tremor     Other reaction(s): Anxiety, Nausea and/or vomiting, Other (See Comments)  Dizziness       Immunization History   Administered Date(s) Administered   • COVID-19 PFIZER VACCINE 0.3 ML IM 03/05/2021, 03/26/2021, 12/14/2021   • COVID-19, unspecified 03/25/2021   • INFLUENZA 10/14/2015, 10/11/2016, 11/06/2017, 11/13/2018, 11/18/2021, 10/20/2022, 10/04/2023   • Influenza Quadrivalent Preservative Free ID 11/20/2013, 10/09/2014, 10/14/2015, 11/09/2015, 10/23/2020   • Influenza Split 12/06/2012, 11/20/2013, 10/09/2014   • Influenza Split High Dose Preservative Free IM 10/14/2015, 11/09/2015, 10/11/2016, 11/06/2017, 11/13/2018, 11/02/2019, 10/23/2020, 11/18/2021   • Influenza, Seasonal Vaccine, Quadrivalent, Adjuvanted, .5e 10/20/2022, 10/04/2023   • Influenza, high dose seasonal 0.7 mL 10/23/2020, 11/18/2021   • Pneumococcal Conjugate 13-Valent 04/12/2016   • Pneumococcal Polysaccharide PPV23 10/18/2002, 11/16/2007       Objective     /62 (BP Location: Left arm, Patient Position: Sitting, Cuff Size: Adult)   Pulse 56   Temp 97.8 °F (36.6 °C) (Tympanic)   Resp 16   Ht 5' 2\" (1.575 m)   Wt 78.9 kg (174 lb)   LMP  (LMP Unknown)   SpO2 100%   BMI 31.83 kg/m²     Physical Exam  Vitals and nursing note reviewed.   Constitutional:       Appearance: She is well-developed.   HENT:      Head: Normocephalic and atraumatic.   Eyes:      Pupils: Pupils are equal, round, and reactive to light.   Cardiovascular:      Rate and Rhythm: Normal rate and regular rhythm.      Heart sounds: Normal heart sounds.   Pulmonary:      Effort: Pulmonary effort is normal.      " Breath sounds: Normal breath sounds.   Abdominal:      General: Bowel sounds are normal.      Palpations: Abdomen is soft.   Musculoskeletal:      Cervical back: Normal range of motion and neck supple.   Lymphadenopathy:      Cervical: No cervical adenopathy.   Skin:     General: Skin is warm.   Neurological:      Mental Status: She is alert and oriented to person, place, and time.       Valeria Chen MD  BMI Counseling: Body mass index is 31.83 kg/m². The BMI is above normal. Nutrition recommendations include reducing portion sizes, decreasing overall calorie intake, and 3-5 servings of fruits/vegetables daily. Exercise recommendations include moderate aerobic physical activity for 150 minutes/week.

## 2024-01-31 NOTE — ASSESSMENT & PLAN NOTE
Lab Results   Component Value Date    EGFR 39 11/21/2023    EGFR 41 09/05/2023    EGFR 34 06/21/2023    CREATININE 1.27 11/21/2023    CREATININE 1.22 09/05/2023    CREATININE 1.43 (H) 06/21/2023   GFR is low but stable.  Continue good oral hydration.  Continue to follow-up with nephrologist.  Continue to monitor.

## 2024-01-31 NOTE — ASSESSMENT & PLAN NOTE
Well-controlled.  Continue same.  Will continue to monitor.  Lab Results   Component Value Date    HGBA1C 5.5 11/21/2023

## 2024-01-31 NOTE — ASSESSMENT & PLAN NOTE
Continue to follow-up with ophthalmologist.  Lab Results   Component Value Date    HGBA1C 5.5 11/21/2023

## 2024-03-11 ENCOUNTER — APPOINTMENT (OUTPATIENT)
Dept: LAB | Facility: IMAGING CENTER | Age: 84
End: 2024-03-11
Payer: COMMERCIAL

## 2024-03-11 DIAGNOSIS — N18.32 STAGE 3B CHRONIC KIDNEY DISEASE (HCC): ICD-10-CM

## 2024-03-11 LAB
ALBUMIN SERPL BCP-MCNC: 4.2 G/DL (ref 3.5–5)
ANION GAP SERPL CALCULATED.3IONS-SCNC: 8 MMOL/L
BUN SERPL-MCNC: 31 MG/DL (ref 5–25)
CALCIUM SERPL-MCNC: 9.2 MG/DL (ref 8.4–10.2)
CHLORIDE SERPL-SCNC: 107 MMOL/L (ref 96–108)
CO2 SERPL-SCNC: 30 MMOL/L (ref 21–32)
CREAT SERPL-MCNC: 1.29 MG/DL (ref 0.6–1.3)
CREAT UR-MCNC: 107.1 MG/DL
ERYTHROCYTE [DISTWIDTH] IN BLOOD BY AUTOMATED COUNT: 14.6 % (ref 11.6–15.1)
GFR SERPL CREATININE-BSD FRML MDRD: 38 ML/MIN/1.73SQ M
GLUCOSE P FAST SERPL-MCNC: 102 MG/DL (ref 65–99)
HCT VFR BLD AUTO: 44.3 % (ref 34.8–46.1)
HGB BLD-MCNC: 14.5 G/DL (ref 11.5–15.4)
MAGNESIUM SERPL-MCNC: 2.2 MG/DL (ref 1.9–2.7)
MCH RBC QN AUTO: 34.7 PG (ref 26.8–34.3)
MCHC RBC AUTO-ENTMCNC: 32.7 G/DL (ref 31.4–37.4)
MCV RBC AUTO: 106 FL (ref 82–98)
PHOSPHATE SERPL-MCNC: 3.3 MG/DL (ref 2.3–4.1)
PLATELET # BLD AUTO: 174 THOUSANDS/UL (ref 149–390)
PMV BLD AUTO: 10.3 FL (ref 8.9–12.7)
POTASSIUM SERPL-SCNC: 4.2 MMOL/L (ref 3.5–5.3)
PROT UR-MCNC: 14 MG/DL
PROT/CREAT UR: 0.13 MG/G{CREAT} (ref 0–0.1)
PTH-INTACT SERPL-MCNC: 67.4 PG/ML (ref 12–88)
RBC # BLD AUTO: 4.18 MILLION/UL (ref 3.81–5.12)
SODIUM SERPL-SCNC: 145 MMOL/L (ref 135–147)
URATE SERPL-MCNC: 6.1 MG/DL (ref 2–7.5)
WBC # BLD AUTO: 6.04 THOUSAND/UL (ref 4.31–10.16)

## 2024-03-11 PROCEDURE — 83735 ASSAY OF MAGNESIUM: CPT

## 2024-03-11 PROCEDURE — 84550 ASSAY OF BLOOD/URIC ACID: CPT

## 2024-03-11 PROCEDURE — 82570 ASSAY OF URINE CREATININE: CPT

## 2024-03-11 PROCEDURE — 80069 RENAL FUNCTION PANEL: CPT

## 2024-03-11 PROCEDURE — 84156 ASSAY OF PROTEIN URINE: CPT

## 2024-03-11 PROCEDURE — 36415 COLL VENOUS BLD VENIPUNCTURE: CPT

## 2024-03-11 PROCEDURE — 83970 ASSAY OF PARATHORMONE: CPT

## 2024-03-11 PROCEDURE — 85027 COMPLETE CBC AUTOMATED: CPT

## 2024-03-12 DIAGNOSIS — I10 ESSENTIAL HYPERTENSION: ICD-10-CM

## 2024-03-12 DIAGNOSIS — K21.9 GASTROESOPHAGEAL REFLUX DISEASE WITHOUT ESOPHAGITIS: ICD-10-CM

## 2024-03-12 RX ORDER — LOSARTAN POTASSIUM 50 MG/1
50 TABLET ORAL DAILY
Qty: 90 TABLET | Refills: 1 | Status: SHIPPED | OUTPATIENT
Start: 2024-03-12

## 2024-03-12 RX ORDER — OMEPRAZOLE 20 MG/1
20 CAPSULE, DELAYED RELEASE ORAL DAILY
Qty: 90 CAPSULE | Refills: 1 | Status: SHIPPED | OUTPATIENT
Start: 2024-03-12

## 2024-04-24 DIAGNOSIS — F32.0 CURRENT MILD EPISODE OF MAJOR DEPRESSIVE DISORDER, UNSPECIFIED WHETHER RECURRENT (HCC): ICD-10-CM

## 2024-05-04 DIAGNOSIS — E78.49 OTHER HYPERLIPIDEMIA: ICD-10-CM

## 2024-05-05 RX ORDER — ROSUVASTATIN CALCIUM 5 MG/1
5 TABLET, COATED ORAL DAILY
Qty: 90 TABLET | Refills: 1 | Status: SHIPPED | OUTPATIENT
Start: 2024-05-05

## 2024-05-23 ENCOUNTER — LAB REQUISITION (OUTPATIENT)
Dept: LAB | Facility: HOSPITAL | Age: 84
End: 2024-05-23
Payer: COMMERCIAL

## 2024-05-23 ENCOUNTER — APPOINTMENT (OUTPATIENT)
Dept: LAB | Facility: IMAGING CENTER | Age: 84
End: 2024-05-23
Payer: COMMERCIAL

## 2024-05-23 DIAGNOSIS — I10 ESSENTIAL HYPERTENSION: ICD-10-CM

## 2024-05-23 DIAGNOSIS — D45 PV (POLYCYTHEMIA VERA) (HCC): ICD-10-CM

## 2024-05-23 DIAGNOSIS — I10 ESSENTIAL (PRIMARY) HYPERTENSION: ICD-10-CM

## 2024-05-23 DIAGNOSIS — E78.49 OTHER HYPERLIPIDEMIA: ICD-10-CM

## 2024-05-23 LAB
ALBUMIN SERPL BCP-MCNC: 4 G/DL (ref 3.5–5)
ALP SERPL-CCNC: 63 U/L (ref 34–104)
ALT SERPL W P-5'-P-CCNC: 11 U/L (ref 7–52)
ANION GAP SERPL CALCULATED.3IONS-SCNC: 8 MMOL/L (ref 4–13)
AST SERPL W P-5'-P-CCNC: 18 U/L (ref 13–39)
BASOPHILS # BLD AUTO: 0.05 THOUSANDS/ÂΜL (ref 0–0.1)
BASOPHILS NFR BLD AUTO: 1 % (ref 0–1)
BILIRUB SERPL-MCNC: 0.63 MG/DL (ref 0.2–1)
BUN SERPL-MCNC: 26 MG/DL (ref 5–25)
CALCIUM SERPL-MCNC: 9.2 MG/DL (ref 8.4–10.2)
CHLORIDE SERPL-SCNC: 107 MMOL/L (ref 96–108)
CHOLEST SERPL-MCNC: 124 MG/DL
CO2 SERPL-SCNC: 27 MMOL/L (ref 21–32)
CREAT SERPL-MCNC: 1.3 MG/DL (ref 0.6–1.3)
EOSINOPHIL # BLD AUTO: 0.13 THOUSAND/ÂΜL (ref 0–0.61)
EOSINOPHIL NFR BLD AUTO: 2 % (ref 0–6)
ERYTHROCYTE [DISTWIDTH] IN BLOOD BY AUTOMATED COUNT: 14.8 % (ref 11.6–15.1)
GFR SERPL CREATININE-BSD FRML MDRD: 38 ML/MIN/1.73SQ M
GLUCOSE SERPL-MCNC: 98 MG/DL (ref 65–140)
HCT VFR BLD AUTO: 43.3 % (ref 34.8–46.1)
HDLC SERPL-MCNC: 50 MG/DL
HGB BLD-MCNC: 14.1 G/DL (ref 11.5–15.4)
IMM GRANULOCYTES # BLD AUTO: 0.02 THOUSAND/UL (ref 0–0.2)
IMM GRANULOCYTES NFR BLD AUTO: 0 % (ref 0–2)
LDLC SERPL CALC-MCNC: 47 MG/DL (ref 0–100)
LYMPHOCYTES # BLD AUTO: 0.93 THOUSANDS/ÂΜL (ref 0.6–4.47)
LYMPHOCYTES NFR BLD AUTO: 17 % (ref 14–44)
MCH RBC QN AUTO: 34.8 PG (ref 26.8–34.3)
MCHC RBC AUTO-ENTMCNC: 32.6 G/DL (ref 31.4–37.4)
MCV RBC AUTO: 107 FL (ref 82–98)
MONOCYTES # BLD AUTO: 0.26 THOUSAND/ÂΜL (ref 0.17–1.22)
MONOCYTES NFR BLD AUTO: 5 % (ref 4–12)
NEUTROPHILS # BLD AUTO: 4.08 THOUSANDS/ÂΜL (ref 1.85–7.62)
NEUTS SEG NFR BLD AUTO: 75 % (ref 43–75)
NRBC BLD AUTO-RTO: 0 /100 WBCS
PLATELET # BLD AUTO: 161 THOUSANDS/UL (ref 149–390)
PMV BLD AUTO: 10.5 FL (ref 8.9–12.7)
POTASSIUM SERPL-SCNC: 4.3 MMOL/L (ref 3.5–5.3)
PROT SERPL-MCNC: 6.7 G/DL (ref 6.4–8.4)
RBC # BLD AUTO: 4.05 MILLION/UL (ref 3.81–5.12)
SODIUM SERPL-SCNC: 142 MMOL/L (ref 135–147)
TRIGL SERPL-MCNC: 134 MG/DL
TSH SERPL DL<=0.05 MIU/L-ACNC: 2.69 UIU/ML (ref 0.45–4.5)
WBC # BLD AUTO: 5.47 THOUSAND/UL (ref 4.31–10.16)

## 2024-05-23 PROCEDURE — 80053 COMPREHEN METABOLIC PANEL: CPT | Performed by: FAMILY MEDICINE

## 2024-05-23 PROCEDURE — 80061 LIPID PANEL: CPT | Performed by: FAMILY MEDICINE

## 2024-05-23 PROCEDURE — 85025 COMPLETE CBC W/AUTO DIFF WBC: CPT | Performed by: FAMILY MEDICINE

## 2024-05-23 PROCEDURE — 84443 ASSAY THYROID STIM HORMONE: CPT | Performed by: FAMILY MEDICINE

## 2024-05-25 DIAGNOSIS — I10 ESSENTIAL HYPERTENSION: ICD-10-CM

## 2024-05-25 RX ORDER — METOPROLOL TARTRATE 50 MG/1
TABLET, FILM COATED ORAL
Qty: 90 TABLET | Refills: 1 | Status: SHIPPED | OUTPATIENT
Start: 2024-05-25

## 2024-06-17 ENCOUNTER — OFFICE VISIT (OUTPATIENT)
Dept: HEMATOLOGY ONCOLOGY | Facility: CLINIC | Age: 84
End: 2024-06-17
Payer: COMMERCIAL

## 2024-06-17 VITALS
OXYGEN SATURATION: 98 % | DIASTOLIC BLOOD PRESSURE: 68 MMHG | TEMPERATURE: 97.5 F | SYSTOLIC BLOOD PRESSURE: 128 MMHG | HEART RATE: 54 BPM | BODY MASS INDEX: 31.47 KG/M2 | HEIGHT: 62 IN | RESPIRATION RATE: 18 BRPM | WEIGHT: 171 LBS

## 2024-06-17 DIAGNOSIS — D45 PV (POLYCYTHEMIA VERA) (HCC): ICD-10-CM

## 2024-06-17 DIAGNOSIS — D45 POLYCYTHEMIA VERA (HCC): Primary | ICD-10-CM

## 2024-06-17 PROCEDURE — G2211 COMPLEX E/M VISIT ADD ON: HCPCS | Performed by: INTERNAL MEDICINE

## 2024-06-17 PROCEDURE — 99214 OFFICE O/P EST MOD 30 MIN: CPT | Performed by: INTERNAL MEDICINE

## 2024-06-17 NOTE — PROGRESS NOTES
Hematology/Oncology Outpatient Follow-up  Mariah Irwin 83 y.o. female 1940 [id number]    Date:  6/17/2024        Assessment and Plan:  1. Polycythemia vera (HCC)  The patient seems to be at goal with hematocrit less than 45% at the current dose of Hydrea 500 mg daily for 2 days and 1 day off.  She will be monitored for treatment related side effects or toxicity.  She was encouraged to continue with the baby aspirin.  We will see her again in about 6 months from now for close follow-up.  - CBC and differential; Future  - Comprehensive metabolic panel; Future  - Magnesium; Future  - LD,Blood; Future            HPI:  The patient came to for a follow-up visit accompanied by her .  She stated that she continues to take the Hydrea and baby aspirin as usual without interruption.  Recent available blood work on 5/23/2024 showed hemoglobin of 14.1 with MCV of 107.  White cells and platelets are within normal range with normal white cell differential.  Creatinine 1.3 with normal calcium and liver enzymes.  TSH was normal  Oncology History Overview Note   Patient had extensive workup for further evaluation of her elevated H&H.  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    She was started on Hydrea 500 mg once a day February 10, 2016.  The dose was increased to 500 mg twice a day which resulted in significant drop of her platelet count.  We did then decrease the dose and put on hold April 4, 2016.    Patient had 2 phlebotomies while she was off the Hydrea.  She is currently on Hydrea 500 mg once a day 2 days on and 1 day off and tolerating this dose well.       Polycythemia vera (HCC)   2/3/2016 Initial Diagnosis    PV (polycythemia vera) (HCC)         Interval history:    ROS: Review of Systems   Constitutional:  Positive for fatigue. Negative for chills and fever.   HENT:  Positive for hearing loss. Negative for ear pain and sore throat.    Eyes:   Negative for pain and visual disturbance.   Respiratory:  Negative for cough and shortness of breath.    Cardiovascular:  Negative for chest pain and palpitations.   Gastrointestinal:  Negative for abdominal pain and vomiting.   Genitourinary:  Negative for dysuria and hematuria.   Musculoskeletal:  Positive for arthralgias. Negative for back pain.   Skin:  Positive for rash. Negative for color change.   Neurological:  Positive for numbness. Negative for seizures and syncope.   Psychiatric/Behavioral:  Positive for sleep disturbance.    All other systems reviewed and are negative.      Past Medical History:   Diagnosis Date    CAD (coronary artery disease)        Past Surgical History:   Procedure Laterality Date    BREAST BIOPSY      CAROTID ENDARTARECTOMY Left     CHOLECYSTECTOMY       and     HYSTERECTOMY  1981    KNEE CARTILAGE SURGERY Right 2001    OOPHORECTOMY  2010    REPLACEMENT TOTAL KNEE         Social History     Socioeconomic History    Marital status: /Civil Union     Spouse name: None    Number of children: None    Years of education: None    Highest education level: None   Occupational History    None   Tobacco Use    Smoking status: Former     Current packs/day: 0.00     Average packs/day: 0.3 packs/day for 1 year (0.3 ttl pk-yrs)     Types: Cigarettes     Start date: 1961     Quit date: 1962     Years since quittin.5     Passive exposure: Past (SPOUSE QUIT AT THE SAME TIME)    Smokeless tobacco: Never    Tobacco comments:     no passive smoke exposure   Vaping Use    Vaping status: Never Used   Substance and Sexual Activity    Alcohol use: No    Drug use: No    Sexual activity: Not Currently     Partners: Male   Other Topics Concern    None   Social History Narrative    None     Social Determinants of Health     Financial Resource Strain: Low Risk  (2023)    Overall Financial Resource Strain (CARDIA)     Difficulty of Paying Living Expenses: Not very hard   Food  Insecurity: Not on file   Transportation Needs: No Transportation Needs (7/28/2023)    PRAPARE - Transportation     Lack of Transportation (Medical): No     Lack of Transportation (Non-Medical): No   Physical Activity: Not on file   Stress: Not on file   Social Connections: Not on file   Intimate Partner Violence: Not on file   Housing Stability: Not on file       Family History   Problem Relation Age of Onset    Heart disease Mother     Hypertension Mother     Diabetes type II Mother     Cancer Father     Cystic fibrosis Daughter     Breast cancer Maternal Aunt 53    Breast cancer Cousin 30       Allergies   Allergen Reactions    Oxycodone Diarrhea, GI Intolerance, Dizziness, Headache, Irritability and Tremor     Other reaction(s): Anxiety, Nausea and/or vomiting, Other (See Comments)  Dizziness           Current Outpatient Medications:     amoxicillin (AMOXIL) 500 mg capsule, TAKE 4 CAPS 1 HOUR BEFORE VISITS, Disp: , Rfl:     aspirin 81 mg chewable tablet, Chew 81 mg daily, Disp: , Rfl:     Cholecalciferol (Vitamin D3) 125 MCG (5000 UT) TABS, Take by mouth daily, Disp: , Rfl:     clotrimazole-betamethasone (LOTRISONE) 1-0.05 % cream, Apply topically 2 (two) times a day, Disp: 30 g, Rfl: 0    cyanocobalamin (VITAMIN B-12) 100 mcg tablet, Take 1,000 mcg by mouth, Disp: , Rfl:     erythromycin with ethanol (THERAMYCIN) 2 % external solution, APPLY TO AFFECTED AREA ON SCALP EVERY DAY AFTER SHOWERING, Disp: , Rfl:     furosemide (LASIX) 20 mg tablet, TAKE 1 TABLET (20 MG TOTAL) BY MOUTH PER WEEK, Disp: , Rfl:     hydrocortisone 2.5 % cream, APPLY TO AFFECTED AREA ON THE STOMACH TWICE A DAY, Disp: , Rfl:     hydroxyurea (HYDREA) 500 mg capsule, TAKE PILLS 2 DAYS ON 1 DAY OFF, Disp: 180 capsule, Rfl: 3    losartan (COZAAR) 50 mg tablet, Take 1 tablet (50 mg total) by mouth daily, Disp: 90 tablet, Rfl: 1    metoprolol tartrate (LOPRESSOR) 50 mg tablet, TAKE 1/2 TABLET BY MOUTH EVERY 12 HOURS, Disp: 90 tablet, Rfl: 1     "omeprazole (PriLOSEC) 20 mg delayed release capsule, Take 1 capsule (20 mg total) by mouth daily, Disp: 90 capsule, Rfl: 1    rosuvastatin (CRESTOR) 5 mg tablet, TAKE 1 TABLET (5 MG TOTAL) BY MOUTH DAILY., Disp: 90 tablet, Rfl: 1    sertraline (ZOLOFT) 50 mg tablet, TAKE 1 TABLET BY MOUTH EVERY DAY, Disp: 90 tablet, Rfl: 1    Sodium Fluoride 5000 PPM 1.1 % GEL, BRUSH TWICE DAILY AFTER BRUSHING SPIT OUT BUT DO NOT RINSE, Disp: , Rfl:       Physical Exam:  /68 (BP Location: Left arm, Patient Position: Sitting, Cuff Size: Adult)   Pulse (!) 54   Temp 97.5 °F (36.4 °C)   Resp 18   Ht 5' 2\" (1.575 m)   Wt 77.6 kg (171 lb)   LMP  (LMP Unknown)   SpO2 98%   BMI 31.28 kg/m²     Physical Exam  Constitutional:       General: She is not in acute distress.     Appearance: She is well-developed. She is not diaphoretic.   HENT:      Head: Normocephalic and atraumatic.      Nose: Nose normal.   Eyes:      General: No scleral icterus.        Right eye: No discharge.         Left eye: No discharge.      Conjunctiva/sclera: Conjunctivae normal.      Pupils: Pupils are equal, round, and reactive to light.   Neck:      Thyroid: No thyromegaly.      Vascular: No JVD.      Trachea: No tracheal deviation.   Cardiovascular:      Rate and Rhythm: Normal rate and regular rhythm.      Heart sounds: Murmur heard.      No friction rub.   Pulmonary:      Effort: Pulmonary effort is normal. No respiratory distress.      Breath sounds: Normal breath sounds. No stridor. No wheezing or rales.   Chest:      Chest wall: No tenderness.   Abdominal:      General: There is no distension.      Palpations: Abdomen is soft. There is no hepatomegaly or splenomegaly.      Tenderness: There is no abdominal tenderness. There is no guarding or rebound.   Musculoskeletal:         General: No tenderness or deformity. Normal range of motion.      Cervical back: Normal range of motion and neck supple.   Lymphadenopathy:      Cervical: No cervical " adenopathy.   Skin:     General: Skin is warm and dry.      Coloration: Skin is not pale.      Findings: No erythema or rash.   Neurological:      Mental Status: She is alert and oriented to person, place, and time.      Cranial Nerves: No cranial nerve deficit.      Coordination: Coordination normal.      Deep Tendon Reflexes: Reflexes are normal and symmetric.   Psychiatric:         Behavior: Behavior normal.         Thought Content: Thought content normal.         Judgment: Judgment normal.           Labs:  Lab Results   Component Value Date    WBC 5.47 05/23/2024    HGB 14.1 05/23/2024    HCT 43.3 05/23/2024     (H) 05/23/2024     05/23/2024     Lab Results   Component Value Date    K 4.3 05/23/2024     05/23/2024    CO2 27 05/23/2024    BUN 26 (H) 05/23/2024    CREATININE 1.30 05/23/2024    GLUF 102 (H) 03/11/2024    CALCIUM 9.2 05/23/2024    CORRECTEDCA 9.4 09/15/2022    AST 18 05/23/2024    ALT 11 05/23/2024    ALKPHOS 63 05/23/2024    EGFR 38 05/23/2024     Lab Results   Component Value Date    TSH 1.64 04/26/2018       Patient voiced understanding and agreement in the above discussion. Aware to contact our office with questions/symptoms in the interim.

## 2024-07-29 ENCOUNTER — APPOINTMENT (OUTPATIENT)
Dept: LAB | Facility: IMAGING CENTER | Age: 84
End: 2024-07-29
Payer: COMMERCIAL

## 2024-07-29 DIAGNOSIS — E11.3292 TYPE 2 DIABETES MELLITUS WITH LEFT EYE AFFECTED BY MILD NONPROLIFERATIVE RETINOPATHY WITHOUT MACULAR EDEMA, WITHOUT LONG-TERM CURRENT USE OF INSULIN (HCC): ICD-10-CM

## 2024-07-29 LAB
ALBUMIN SERPL BCG-MCNC: 4 G/DL (ref 3.5–5)
ALP SERPL-CCNC: 61 U/L (ref 34–104)
ALT SERPL W P-5'-P-CCNC: 14 U/L (ref 7–52)
ANION GAP SERPL CALCULATED.3IONS-SCNC: 8 MMOL/L (ref 4–13)
AST SERPL W P-5'-P-CCNC: 19 U/L (ref 13–39)
BASOPHILS # BLD AUTO: 0.05 THOUSANDS/ÂΜL (ref 0–0.1)
BASOPHILS NFR BLD AUTO: 1 % (ref 0–1)
BILIRUB SERPL-MCNC: 0.62 MG/DL (ref 0.2–1)
BUN SERPL-MCNC: 27 MG/DL (ref 5–25)
CALCIUM SERPL-MCNC: 9.3 MG/DL (ref 8.4–10.2)
CHLORIDE SERPL-SCNC: 106 MMOL/L (ref 96–108)
CHOLEST SERPL-MCNC: 139 MG/DL
CO2 SERPL-SCNC: 30 MMOL/L (ref 21–32)
CREAT SERPL-MCNC: 1.29 MG/DL (ref 0.6–1.3)
CREAT UR-MCNC: 66.5 MG/DL
EOSINOPHIL # BLD AUTO: 0.13 THOUSAND/ÂΜL (ref 0–0.61)
EOSINOPHIL NFR BLD AUTO: 2 % (ref 0–6)
ERYTHROCYTE [DISTWIDTH] IN BLOOD BY AUTOMATED COUNT: 15 % (ref 11.6–15.1)
EST. AVERAGE GLUCOSE BLD GHB EST-MCNC: 111 MG/DL
GFR SERPL CREATININE-BSD FRML MDRD: 38 ML/MIN/1.73SQ M
GLUCOSE P FAST SERPL-MCNC: 96 MG/DL (ref 65–99)
HBA1C MFR BLD: 5.5 %
HCT VFR BLD AUTO: 44.7 % (ref 34.8–46.1)
HDLC SERPL-MCNC: 57 MG/DL
HGB BLD-MCNC: 14.4 G/DL (ref 11.5–15.4)
IMM GRANULOCYTES # BLD AUTO: 0.03 THOUSAND/UL (ref 0–0.2)
IMM GRANULOCYTES NFR BLD AUTO: 1 % (ref 0–2)
LDLC SERPL CALC-MCNC: 55 MG/DL (ref 0–100)
LYMPHOCYTES # BLD AUTO: 1.05 THOUSANDS/ÂΜL (ref 0.6–4.47)
LYMPHOCYTES NFR BLD AUTO: 20 % (ref 14–44)
MCH RBC QN AUTO: 35.2 PG (ref 26.8–34.3)
MCHC RBC AUTO-ENTMCNC: 32.2 G/DL (ref 31.4–37.4)
MCV RBC AUTO: 109 FL (ref 82–98)
MICROALBUMIN UR-MCNC: 9.7 MG/L
MICROALBUMIN/CREAT 24H UR: 15 MG/G CREATININE (ref 0–30)
MONOCYTES # BLD AUTO: 0.29 THOUSAND/ÂΜL (ref 0.17–1.22)
MONOCYTES NFR BLD AUTO: 5 % (ref 4–12)
NEUTROPHILS # BLD AUTO: 3.83 THOUSANDS/ÂΜL (ref 1.85–7.62)
NEUTS SEG NFR BLD AUTO: 71 % (ref 43–75)
NRBC BLD AUTO-RTO: 0 /100 WBCS
PLATELET # BLD AUTO: 133 THOUSANDS/UL (ref 149–390)
PMV BLD AUTO: 9.8 FL (ref 8.9–12.7)
POTASSIUM SERPL-SCNC: 4.3 MMOL/L (ref 3.5–5.3)
PROT SERPL-MCNC: 6.6 G/DL (ref 6.4–8.4)
RBC # BLD AUTO: 4.09 MILLION/UL (ref 3.81–5.12)
SODIUM SERPL-SCNC: 144 MMOL/L (ref 135–147)
TRIGL SERPL-MCNC: 135 MG/DL
TSH SERPL DL<=0.05 MIU/L-ACNC: 1.81 UIU/ML (ref 0.45–4.5)
WBC # BLD AUTO: 5.38 THOUSAND/UL (ref 4.31–10.16)

## 2024-07-29 PROCEDURE — 82570 ASSAY OF URINE CREATININE: CPT

## 2024-07-29 PROCEDURE — 85025 COMPLETE CBC W/AUTO DIFF WBC: CPT

## 2024-07-29 PROCEDURE — 82043 UR ALBUMIN QUANTITATIVE: CPT

## 2024-07-29 PROCEDURE — 84443 ASSAY THYROID STIM HORMONE: CPT

## 2024-07-29 PROCEDURE — 80061 LIPID PANEL: CPT

## 2024-07-29 PROCEDURE — 83036 HEMOGLOBIN GLYCOSYLATED A1C: CPT

## 2024-07-29 PROCEDURE — 36415 COLL VENOUS BLD VENIPUNCTURE: CPT

## 2024-07-29 PROCEDURE — 80053 COMPREHEN METABOLIC PANEL: CPT

## 2024-08-05 ENCOUNTER — OFFICE VISIT (OUTPATIENT)
Dept: FAMILY MEDICINE CLINIC | Facility: CLINIC | Age: 84
End: 2024-08-05
Payer: COMMERCIAL

## 2024-08-05 VITALS
WEIGHT: 169.8 LBS | SYSTOLIC BLOOD PRESSURE: 128 MMHG | HEART RATE: 49 BPM | DIASTOLIC BLOOD PRESSURE: 64 MMHG | BODY MASS INDEX: 31.25 KG/M2 | RESPIRATION RATE: 16 BRPM | OXYGEN SATURATION: 98 % | TEMPERATURE: 96.8 F | HEIGHT: 62 IN

## 2024-08-05 DIAGNOSIS — E11.51 TYPE 2 DIABETES MELLITUS WITH DIABETIC PERIPHERAL ANGIOPATHY WITHOUT GANGRENE, WITHOUT LONG-TERM CURRENT USE OF INSULIN (HCC): ICD-10-CM

## 2024-08-05 DIAGNOSIS — I10 ESSENTIAL HYPERTENSION: ICD-10-CM

## 2024-08-05 DIAGNOSIS — E78.49 OTHER HYPERLIPIDEMIA: ICD-10-CM

## 2024-08-05 DIAGNOSIS — I73.9 PERIPHERAL VASCULAR DISEASE, UNSPECIFIED (HCC): ICD-10-CM

## 2024-08-05 DIAGNOSIS — F32.0 MAJOR DEPRESSIVE DISORDER, SINGLE EPISODE, MILD (HCC): ICD-10-CM

## 2024-08-05 DIAGNOSIS — Z00.00 MEDICARE ANNUAL WELLNESS VISIT, SUBSEQUENT: ICD-10-CM

## 2024-08-05 DIAGNOSIS — D45 POLYCYTHEMIA VERA (HCC): ICD-10-CM

## 2024-08-05 DIAGNOSIS — E11.3292 TYPE 2 DIABETES MELLITUS WITH LEFT EYE AFFECTED BY MILD NONPROLIFERATIVE RETINOPATHY WITHOUT MACULAR EDEMA, WITHOUT LONG-TERM CURRENT USE OF INSULIN (HCC): Primary | ICD-10-CM

## 2024-08-05 DIAGNOSIS — N18.32 STAGE 3B CHRONIC KIDNEY DISEASE (HCC): ICD-10-CM

## 2024-08-05 PROCEDURE — 99214 OFFICE O/P EST MOD 30 MIN: CPT | Performed by: FAMILY MEDICINE

## 2024-08-05 PROCEDURE — G0439 PPPS, SUBSEQ VISIT: HCPCS | Performed by: FAMILY MEDICINE

## 2024-08-05 RX ORDER — METOPROLOL SUCCINATE 25 MG/1
25 TABLET, EXTENDED RELEASE ORAL DAILY
COMMUNITY

## 2024-08-05 NOTE — PROGRESS NOTES
Ambulatory Visit  Name: Mariah Irwin      : 1940      MRN: [id number]  Encounter Provider: Valeria Chen MD  Encounter Date: 2024   Encounter department: [facility]    Assessment & Plan   1. Type 2 diabetes mellitus with left eye affected by mild nonproliferative retinopathy without macular edema, without long-term current use of insulin (HCC)  -     Albumin / creatinine urine ratio; Future; Expected date: 2024  -     Comprehensive metabolic panel; Future; Expected date: 2024  -     Hemoglobin A1C; Future; Expected date: 2024  -     CBC and differential; Future; Expected date: 2024  -     TSH, 3rd generation with Free T4 reflex; Future; Expected date: 2024  -     Lipid Panel with Direct LDL reflex; Future; Expected date: 2024  2. Type 2 diabetes mellitus with diabetic peripheral angiopathy without gangrene, without long-term current use of insulin (HCC)  Assessment & Plan:  A1c is well-controlled.  Continue to monitor.  Lab Results   Component Value Date    HGBA1C 5.5 2024     3. Peripheral vascular disease, unspecified (HCC)  Assessment & Plan:  Asymptomatic.  Can follow-up with vascular surgeon.  4. Essential hypertension  Assessment & Plan:  Controlled.  Continue same.  Will continue to monitor.  5. Medicare annual wellness visit, subsequent  Assessment & Plan:  It was discussed about immunizations, diet, exercise and safety measures.  6. Polycythemia vera (HCC)  Assessment & Plan:    We will continue to monitor. Continue to follow-up with hematologist.  7. Major depressive disorder, single episode, mild (HCC)  Assessment & Plan:  Stable.  Continue same.  Will continue to monitor.  8. Stage 3b chronic kidney disease (HCC)  Assessment & Plan:  Lab Results   Component Value Date    EGFR 38 2024    EGFR 38 2024    EGFR 38 2024    CREATININE 1.29 2024    CREATININE 1.30 2024    CREATININE 1.29  03/11/2024   Stable.  Continue to encourage oral hydration.  Continue to monitor  9. Other hyperlipidemia  Assessment & Plan:  Well-controlled.  Continue on Crestor.  Continue to monitor.      Depression Screening and Follow-up Plan: Patient was screened for depression during today's encounter. They screened negative with a PHQ-9 score of 0.    Urinary Incontinence Plan of Care: counseling topics discussed: use restroom every 2 hours, limit alcohol, caffeine, spicy foods, and acidic foods and keeping a bladder diary.       Preventive health issues were discussed with patient, and age appropriate screening tests were ordered as noted in patient's After Visit Summary. Personalized health advice and appropriate referrals for health education or preventive services given if needed, as noted in patient's After Visit Summary.    History of Present Illness     She is here today for follow-up multiple medical problems.  She has been taking her medications.  She denies any side effect of her medications.  She continues to follow-up with vascular surgeon.  She had blood work done came back showing cholesterol and A1c well-controlled.  Her GFR is low but stable.  Continue to follow-up with nephrology.       Patient Care Team:  Valeria Chen MD as PCP - General (Family Medicine)  Valeria Chen MD as PCP - PCP-[facility]  Nica Coffman PA-C as Physician Assistant (Vascular Surgery)  Azeb Ribeiro MD as Medical Oncologist (Hematology and Oncology)    Review of Systems   Constitutional:  Negative for chills and fever.   HENT:  Negative for trouble swallowing.    Eyes:  Negative for visual disturbance.   Respiratory:  Negative for cough and shortness of breath.    Cardiovascular:  Negative for chest pain, palpitations and leg swelling.   Gastrointestinal:  Negative for abdominal pain, constipation and diarrhea.   Endocrine: Negative for cold intolerance and heat intolerance.   Genitourinary:   Positive for difficulty urinating and frequency. Negative for dysuria.   Musculoskeletal:  Negative for gait problem.   Skin:  Negative for rash.   Neurological:  Negative for dizziness, tremors, seizures and headaches.   Hematological:  Negative for adenopathy.   Psychiatric/Behavioral:  Negative for behavioral problems.      Medical History Reviewed by provider this encounter:  Tobacco  Allergies  Meds  Problems  Med Hx  Surg Hx  Fam Hx       Annual Wellness Visit Questionnaire   Mariah is here for her Subsequent Wellness visit.     Health Risk Assessment:   Patient rates overall health as fair. Patient feels that their physical health rating is slightly worse. Patient is satisfied with their life. Eyesight was rated as slightly worse. Hearing was rated as much worse. Patient feels that their emotional and mental health rating is same. Patients states they are sometimes angry. Patient states they are often unusually tired/fatigued. Pain experienced in the last 7 days has been some. Patient's pain rating has been 4/10. Patient states that she has experienced weight loss or gain in last 6 months.     Depression Screening:   PHQ-9 Score: 0      Fall Risk Screening:   In the past year, patient has experienced: no history of falling in past year      Urinary Incontinence Screening:   Patient has leaked urine accidently in the last six months.     Home Safety:  Patient has trouble with stairs inside or outside of their home. Patient has working smoke alarms and has no working carbon monoxide detector. Home safety hazards include: medications that cause fatigue.     Nutrition:   Current diet is Regular.     Medications:   Patient is currently taking over-the-counter supplements. OTC medications include: see medication list. Patient is able to manage medications.     Activities of Daily Living (ADLs)/Instrumental Activities of Daily Living (IADLs):   Walk and transfer into and out of bed and chair?: Yes  Dress and  groom yourself?: Yes    Bathe or shower yourself?: Yes    Feed yourself? Yes  Do your laundry/housekeeping?: Yes  Manage your money, pay your bills and track your expenses?: Yes  Make your own meals?: Yes    Do your own shopping?: Yes    Previous Hospitalizations:   Any hospitalizations or ED visits within the last 12 months?: No      Advance Care Planning:   Living will: Yes    Durable POA for healthcare: Yes    Advanced directive: Yes      Cognitive Screening:   Provider or family/friend/caregiver concerned regarding cognition?: No    PREVENTIVE SCREENINGS      Cardiovascular Screening:    General: Screening Not Indicated and History Lipid Disorder      Diabetes Screening:     General: Screening Not Indicated and History Diabetes      Colorectal Cancer Screening:     General: Screening Not Indicated      Breast Cancer Screening:     General: Screening Not Indicated      Cervical Cancer Screening:    General: Screening Not Indicated      Osteoporosis Screening:    General: Risks and Benefits Discussed      Abdominal Aortic Aneurysm (AAA) Screening:        General: Risks and Benefits Discussed and Screening Not Indicated      Lung Cancer Screening:     General: Screening Not Indicated      Hepatitis C Screening:    General: Risks and Benefits Discussed    Screening, Brief Intervention, and Referral to Treatment (SBIRT)    Screening  Typical number of drinks in a day: 0  Typical number of drinks in a week: 0  Interpretation: Low risk drinking behavior.    AUDIT-C Screenin) How often did you have a drink containing alcohol in the past year? monthly or less  2) How many drinks did you have on a typical day when you were drinking in the past year? 0  3) How often did you have 6 or more drinks on one occasion in the past year? never    AUDIT-C Score: 1  Interpretation: Score 0-2 (female): Negative screen for alcohol misuse    Single Item Drug Screening:  How often have you used an illegal drug (including marijuana)  "or a prescription medication for non-medical reasons in the past year? never    Single Item Drug Screen Score: 0  Interpretation: Negative screen for possible drug use disorder    Brief Intervention  Alcohol & drug use screenings were reviewed. No concerns regarding substance use disorder identified.     Other Counseling Topics:   Regular weightbearing exercise and calcium and vitamin D intake.     Social Determinants of Health     Financial Resource Strain: Low Risk  (7/28/2023)    Overall Financial Resource Strain (CARDIA)    • Difficulty of Paying Living Expenses: Not very hard   Food Insecurity: No Food Insecurity (7/29/2024)    Hunger Vital Sign    • Worried About Running Out of Food in the Last Year: Never true    • Ran Out of Food in the Last Year: Never true   Transportation Needs: No Transportation Needs (7/29/2024)    PRAPARE - Transportation    • Lack of Transportation (Medical): No    • Lack of Transportation (Non-Medical): No   Housing Stability: Low Risk  (7/29/2024)    Housing Stability Vital Sign    • Unable to Pay for Housing in the Last Year: No    • Number of Times Moved in the Last Year: 0    • Homeless in the Last Year: No   Utilities: Not At Risk (7/29/2024)    [facility] Utilities    • Threatened with loss of utilities: No     No results found.    Objective     /64 (BP Location: Left arm, Patient Position: Sitting, Cuff Size: Large)   Pulse (!) 49   Temp (!) 96.8 °F (36 °C) (Tympanic)   Resp 16   Ht 5' 2\" (1.575 m)   Wt 77 kg (169 lb 12.8 oz)   LMP  (LMP Unknown)   SpO2 98%   BMI 31.06 kg/m²     Physical Exam  Vitals and nursing note reviewed.   Constitutional:       Appearance: She is well-developed.   HENT:      Head: Normocephalic and atraumatic.   Eyes:      Pupils: Pupils are equal, round, and reactive to light.   Cardiovascular:      Rate and Rhythm: Normal rate and regular rhythm.      Heart sounds: Normal heart sounds.   Pulmonary:      Effort: Pulmonary effort is normal.      " Breath sounds: Normal breath sounds.   Abdominal:      General: Bowel sounds are normal.      Palpations: Abdomen is soft.   Musculoskeletal:      Cervical back: Normal range of motion and neck supple.   Lymphadenopathy:      Cervical: No cervical adenopathy.   Skin:     General: Skin is warm.   Neurological:      Mental Status: She is alert and oriented to person, place, and time.

## 2024-08-18 NOTE — ASSESSMENT & PLAN NOTE
A1c is well-controlled.  Continue to monitor.  Lab Results   Component Value Date    HGBA1C 5.5 07/29/2024

## 2024-08-18 NOTE — ASSESSMENT & PLAN NOTE
Lab Results   Component Value Date    EGFR 38 07/29/2024    EGFR 38 05/23/2024    EGFR 38 03/11/2024    CREATININE 1.29 07/29/2024    CREATININE 1.30 05/23/2024    CREATININE 1.29 03/11/2024   Stable.  Continue to encourage oral hydration.  Continue to monitor

## 2024-09-02 DIAGNOSIS — K21.9 GASTROESOPHAGEAL REFLUX DISEASE WITHOUT ESOPHAGITIS: ICD-10-CM

## 2024-09-02 DIAGNOSIS — I10 ESSENTIAL HYPERTENSION: ICD-10-CM

## 2024-09-02 RX ORDER — LOSARTAN POTASSIUM 50 MG/1
50 TABLET ORAL DAILY
Qty: 90 TABLET | Refills: 1 | Status: SHIPPED | OUTPATIENT
Start: 2024-09-02

## 2024-09-17 PROBLEM — Z00.00 MEDICARE ANNUAL WELLNESS VISIT, SUBSEQUENT: Status: RESOLVED | Noted: 2019-06-11 | Resolved: 2024-09-17

## 2024-10-14 ENCOUNTER — HOSPITAL ENCOUNTER (OUTPATIENT)
Dept: NON INVASIVE DIAGNOSTICS | Facility: CLINIC | Age: 84
Discharge: HOME | End: 2024-10-14
Payer: COMMERCIAL

## 2024-10-14 DIAGNOSIS — I73.9 PERIPHERAL VASCULAR DISEASE, UNSPECIFIED (HCC): ICD-10-CM

## 2024-10-14 PROCEDURE — 93925 LOWER EXTREMITY STUDY: CPT

## 2024-10-14 PROCEDURE — 93923 UPR/LXTR ART STDY 3+ LVLS: CPT

## 2024-10-15 PROCEDURE — 93922 UPR/L XTREMITY ART 2 LEVELS: CPT | Performed by: SURGERY

## 2024-10-15 PROCEDURE — 93925 LOWER EXTREMITY STUDY: CPT | Performed by: SURGERY

## 2024-10-16 ENCOUNTER — TELEPHONE (OUTPATIENT)
Dept: VASCULAR SURGERY | Facility: CLINIC | Age: 84
End: 2024-10-16

## 2024-10-16 NOTE — TELEPHONE ENCOUNTER
Attempted to contact patient to schedule appointment(s) listed below.  Requested patient call [phone] to schedule appointment(s).    Patient's appointment(s) are due now.    Dopplers  [] Abdominal Aorta Iliac (AOIL)  [] Carotid (CV)   [] Celiac and/or Mesenteric  [] Endovascular Aortic Repair (EVAR)   [] Hemodialysis Access (HD)   [] Lower Limb Arterial (EDWARD)  [] Lower Limb Venous (LEV)  [] Lower Limb Venous Duplex with Reflux (LEVDR)  [] Renal Artery  [] Upper Limb Arterial (UEA)    [] Upper Limb Venous (UEV)              [] MILY and Waveform analysis     Advanced Imaging   [] CTA head/neck    [] CTA abdomen    [] CTA abdomen & pelvis    [] CT abdomen with/ without contrast  [] CT abdomen with contrast  [] CT abdomen without contrast    [] CT abdomen & pelvis with/ without contrast  [] CT abdomen & pelvis with contrast  [] CT abdomen & pelvis without contrast    Office Visit   [] New patient, patient last seen over 3 years ago  [x] Risk factor modification (RFM)   [x] Follow up   [] Lost to follow up (LTFU)   Called patient & LMOM to schedule 1 yr ov/  1 yr ov res rev w. compression and EDWARD

## 2024-10-23 DIAGNOSIS — F32.0 CURRENT MILD EPISODE OF MAJOR DEPRESSIVE DISORDER, UNSPECIFIED WHETHER RECURRENT (HCC): ICD-10-CM

## 2024-10-27 DIAGNOSIS — E78.49 OTHER HYPERLIPIDEMIA: ICD-10-CM

## 2024-10-27 RX ORDER — ROSUVASTATIN CALCIUM 5 MG/1
5 TABLET, COATED ORAL DAILY
Qty: 90 TABLET | Refills: 1 | Status: SHIPPED | OUTPATIENT
Start: 2024-10-27

## 2024-11-04 ENCOUNTER — APPOINTMENT (OUTPATIENT)
Dept: LAB | Facility: IMAGING CENTER | Age: 84
End: 2024-11-04
Payer: COMMERCIAL

## 2024-11-04 DIAGNOSIS — D45 POLYCYTHEMIA VERA (HCC): ICD-10-CM

## 2024-11-04 DIAGNOSIS — E11.3292 TYPE 2 DIABETES MELLITUS WITH LEFT EYE AFFECTED BY MILD NONPROLIFERATIVE RETINOPATHY WITHOUT MACULAR EDEMA, WITHOUT LONG-TERM CURRENT USE OF INSULIN (HCC): ICD-10-CM

## 2024-11-04 LAB
ALBUMIN SERPL BCG-MCNC: 4.2 G/DL (ref 3.5–5)
ALP SERPL-CCNC: 67 U/L (ref 34–104)
ALT SERPL W P-5'-P-CCNC: 14 U/L (ref 7–52)
ANION GAP SERPL CALCULATED.3IONS-SCNC: 8 MMOL/L (ref 4–13)
AST SERPL W P-5'-P-CCNC: 21 U/L (ref 13–39)
BASOPHILS # BLD AUTO: 0.04 THOUSANDS/ΜL (ref 0–0.1)
BASOPHILS NFR BLD AUTO: 1 % (ref 0–1)
BILIRUB SERPL-MCNC: 0.62 MG/DL (ref 0.2–1)
BUN SERPL-MCNC: 25 MG/DL (ref 5–25)
CALCIUM SERPL-MCNC: 9.3 MG/DL (ref 8.4–10.2)
CHLORIDE SERPL-SCNC: 104 MMOL/L (ref 96–108)
CHOLEST SERPL-MCNC: 142 MG/DL
CO2 SERPL-SCNC: 31 MMOL/L (ref 21–32)
CREAT SERPL-MCNC: 1.14 MG/DL (ref 0.6–1.3)
CREAT UR-MCNC: 75.4 MG/DL
EOSINOPHIL # BLD AUTO: 0.08 THOUSAND/ΜL (ref 0–0.61)
EOSINOPHIL NFR BLD AUTO: 2 % (ref 0–6)
ERYTHROCYTE [DISTWIDTH] IN BLOOD BY AUTOMATED COUNT: 14.3 % (ref 11.6–15.1)
EST. AVERAGE GLUCOSE BLD GHB EST-MCNC: 103 MG/DL
GFR SERPL CREATININE-BSD FRML MDRD: 44 ML/MIN/1.73SQ M
GLUCOSE P FAST SERPL-MCNC: 98 MG/DL (ref 65–99)
HBA1C MFR BLD: 5.2 %
HCT VFR BLD AUTO: 43.5 % (ref 34.8–46.1)
HDLC SERPL-MCNC: 62 MG/DL
HGB BLD-MCNC: 14 G/DL (ref 11.5–15.4)
IMM GRANULOCYTES # BLD AUTO: 0.04 THOUSAND/UL (ref 0–0.2)
IMM GRANULOCYTES NFR BLD AUTO: 1 % (ref 0–2)
LDH SERPL-CCNC: 191 U/L (ref 140–271)
LDLC SERPL CALC-MCNC: 57 MG/DL (ref 0–100)
LYMPHOCYTES # BLD AUTO: 0.97 THOUSANDS/ΜL (ref 0.6–4.47)
LYMPHOCYTES NFR BLD AUTO: 19 % (ref 14–44)
MAGNESIUM SERPL-MCNC: 2.1 MG/DL (ref 1.9–2.7)
MCH RBC QN AUTO: 34.7 PG (ref 26.8–34.3)
MCHC RBC AUTO-ENTMCNC: 32.2 G/DL (ref 31.4–37.4)
MCV RBC AUTO: 108 FL (ref 82–98)
MICROALBUMIN UR-MCNC: 20.4 MG/L
MICROALBUMIN/CREAT 24H UR: 27 MG/G CREATININE (ref 0–30)
MONOCYTES # BLD AUTO: 0.25 THOUSAND/ΜL (ref 0.17–1.22)
MONOCYTES NFR BLD AUTO: 5 % (ref 4–12)
NEUTROPHILS # BLD AUTO: 3.66 THOUSANDS/ΜL (ref 1.85–7.62)
NEUTS SEG NFR BLD AUTO: 72 % (ref 43–75)
NRBC BLD AUTO-RTO: 0 /100 WBCS
PLATELET # BLD AUTO: 136 THOUSANDS/UL (ref 149–390)
PMV BLD AUTO: 10.5 FL (ref 8.9–12.7)
POTASSIUM SERPL-SCNC: 4.5 MMOL/L (ref 3.5–5.3)
PROT SERPL-MCNC: 7 G/DL (ref 6.4–8.4)
RBC # BLD AUTO: 4.04 MILLION/UL (ref 3.81–5.12)
SODIUM SERPL-SCNC: 143 MMOL/L (ref 135–147)
TRIGL SERPL-MCNC: 116 MG/DL
TSH SERPL DL<=0.05 MIU/L-ACNC: 2.09 UIU/ML (ref 0.45–4.5)
WBC # BLD AUTO: 5.04 THOUSAND/UL (ref 4.31–10.16)

## 2024-11-04 PROCEDURE — 83735 ASSAY OF MAGNESIUM: CPT

## 2024-11-04 PROCEDURE — 83036 HEMOGLOBIN GLYCOSYLATED A1C: CPT

## 2024-11-04 PROCEDURE — 82043 UR ALBUMIN QUANTITATIVE: CPT

## 2024-11-04 PROCEDURE — 83615 LACTATE (LD) (LDH) ENZYME: CPT

## 2024-11-04 PROCEDURE — 82570 ASSAY OF URINE CREATININE: CPT

## 2024-11-08 ENCOUNTER — OFFICE VISIT (OUTPATIENT)
Dept: VASCULAR SURGERY | Facility: CLINIC | Age: 84
End: 2024-11-08
Payer: COMMERCIAL

## 2024-11-08 VITALS
DIASTOLIC BLOOD PRESSURE: 68 MMHG | HEIGHT: 62 IN | BODY MASS INDEX: 31.28 KG/M2 | WEIGHT: 170 LBS | SYSTOLIC BLOOD PRESSURE: 126 MMHG | HEART RATE: 71 BPM

## 2024-11-08 DIAGNOSIS — I77.9 PAOD (PERIPHERAL ARTERIAL OCCLUSIVE DISEASE) (HCC): Primary | ICD-10-CM

## 2024-11-08 PROCEDURE — 99213 OFFICE O/P EST LOW 20 MIN: CPT

## 2024-11-08 NOTE — PROGRESS NOTES
Ambulatory Visit  Name: Mariah Irwin      : 1940      MRN: [id number]  Encounter Provider: Nica Coffman PA-C  Encounter Date: 2024   Encounter department: [facility] VASC[facility]    Assessment & Plan  PAOD (peripheral arterial occlusive disease) (HCC)  84 y.o. female patient w/ PMH significant for HTN, HLD, DM2, and right above knee pop to below-knee popliteal bypass back in  by Dr. Myrick as a result of complications (iatrogenic popliteal artery injury) during total knee replacement c/b delayed wound healing requiring several wound washout/debridements and wound vac therapy.  Patient presents today for routine follow up and results review of non-invasive imaging.      -EDWARD demonstrates diffuse disease in fem-pop arteries bilaterally with a widely patent prox popliteal-distal popliteal artery bypass graft. R MILY 1.13/188/118; L MILY 1.34/167/148.      Plan:  -Patent proximal to distal popliteal artery bypass graft with palpable bypass and DP pulse. No further intervention indicated at this time.   -Continue surveillance with yearly LEAD to evaluate bypass graft and for progression of disease.   -Discussed risk factor modification, including adequate glycemic and lipid control, smoking cessation, blood pressure, and lifestyle modifications.   -Restart daily ASA 81 mg. Continue statin therapy with LDL goal <100.   -Recommend starting a structured walking program 3-5 times/week for 30 minutes. Patient should walk until claudication symptoms occur, rest for 5-10 minutes, then continue to walk. Patient should increase distance each week.   -Follow up in 1 year.   -Instructed patient to call the office with questions, concerns, or new symptoms    Orders:    VAS ARTERIAL DUPLEX- LOWER LIMB BILATERAL; Future      History of Present Illness     Mariah Irwin is a 84 y.o. female who presents presents today for results review of lower extremity arterial duplex.  She denies  "claudication, rest pain, tissue loss.  She reports generalized tiredness in both of her legs by the end of the day after taking care of her .  She for she spends long hours on her feet.  She denies use of compression stockings but does elevate her feet periodically.  She reports her right leg will swell more than her left leg and this is the same leg that she had her vascular surgery and total knee replacement on.  She states she was told by her PCP to stop taking aspirin.  She has been taking rosuvastatin 5 mg as prescribed.        History obtained from : patient  Review of Systems   Constitutional: Negative.    HENT: Negative.     Eyes: Negative.    Respiratory: Negative.     Cardiovascular: Negative.    Gastrointestinal: Negative.    Endocrine: Negative.    Genitourinary: Negative.    Musculoskeletal: Negative.    Skin: Negative.    Allergic/Immunologic: Negative.    Neurological: Negative.    Hematological: Negative.    Psychiatric/Behavioral: Negative.       I have personally reviewed and made appropriate changes to the ROS that was input by the medical assistant.         Vitals:    11/08/24 1253   BP: 126/68   BP Location: Left arm   Patient Position: Sitting   Cuff Size: Standard   Pulse: 71   Weight: 77.1 kg (170 lb)   Height: 5' 2\" (1.575 m)       Patient Active Problem List   Diagnosis    Polycythemia vera (HCC)    Other vitamin B12 deficiency anemias    Allergic rhinitis    Angina pectoris (HCC)    Anxiety    Arteriosclerosis of coronary artery    Benign neoplasm of colon    Carotid stenosis    Cholecystitis    Colon polyp    Gastroesophageal reflux disease    Herpes zoster    Hyperlipidemia    Osteoarthritis    Morbid obesity (HCC)    Lymphedema    Posterior vitreous detachment    Renal failure    Retinal detachment    Vitamin D deficiency    Hypertensive kidney disease with chronic kidney disease, stage 1-4 or unspecified chronic kidney disease    Skin infection    Psoriasis    PAOD (peripheral " arterial occlusive disease) (HCC)    Injury of right popliteal artery    Stage 3b chronic kidney disease (HCC)    Chronic pain of left ankle    Dermatitis    Major depressive disorder, single episode, mild (HCC)    Type 2 diabetes mellitus with left eye affected by mild nonproliferative retinopathy without macular edema, without long-term current use of insulin (HCC)    Acute left-sided low back pain with left-sided sciatica    Type 2 diabetes mellitus with chronic kidney disease (HCC)    Essential hypertension    Blister of scalp with infection    Unspecified atherosclerosis of native arteries of extremities, bilateral legs (HCC)    Peripheral vascular disease, unspecified (HCC)    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)    Age-related nuclear cataract, bilateral    Type 2 diabetes mellitus with diabetic peripheral angiopathy without gangrene (HCC)    Pre-op examination    Status post popliteal-distal bypass surgery    Class 1 obesity due to excess calories with serious comorbidity and body mass index (BMI) of 33.0 to 33.9 in adult    Atherosclerosis of native coronary artery of native heart with angina pectoris (HCC)    Other specified peripheral vascular diseases (HCC)    Embolism and thrombosis of arteries of the lower extremities (HCC)       Past Surgical History:   Procedure Laterality Date    BREAST BIOPSY      CAROTID ENDARTARECTOMY Left     CHOLECYSTECTOMY      2011 and 2012    HYSTERECTOMY  1981    KNEE CARTILAGE SURGERY Right 2001    OOPHORECTOMY  2010    REPLACEMENT TOTAL KNEE  2001       Family History   Problem Relation Age of Onset    Heart disease Mother     Hypertension Mother     Diabetes type II Mother     Cancer Father     Cystic fibrosis Daughter     Breast cancer Maternal Aunt 53    Breast cancer Cousin 30       Social History     Socioeconomic History    Marital status: /Civil Union     Spouse name: Not on file    Number of children: Not on file    Years of education: Not  on file    Highest education level: Not on file   Occupational History    Not on file   Tobacco Use    Smoking status: Former     Current packs/day: 0.00     Average packs/day: 0.3 packs/day for 1 year (0.3 ttl pk-yrs)     Types: Cigarettes     Start date: 1961     Quit date: 1962     Years since quittin.8     Passive exposure: Past (SPOUSE QUIT AT THE SAME TIME)    Smokeless tobacco: Never    Tobacco comments:     no passive smoke exposure   Vaping Use    Vaping status: Never Used   Substance and Sexual Activity    Alcohol use: No    Drug use: No    Sexual activity: Not Currently     Partners: Male   Other Topics Concern    Not on file   Social History Narrative    Not on file     Social Determinants of Health     Financial Resource Strain: Low Risk  (2023)    Overall Financial Resource Strain (CARDIA)     Difficulty of Paying Living Expenses: Not very hard   Food Insecurity: No Food Insecurity (2024)    Nursing - Inadequate Food Risk Classification     Worried About Running Out of Food in the Last Year: Never true     Ran Out of Food in the Last Year: Never true     Ran Out of Food in the Last Year: Not on file   Transportation Needs: No Transportation Needs (2024)    PRAPARE - Transportation     Lack of Transportation (Medical): No     Lack of Transportation (Non-Medical): No   Physical Activity: Not on file   Stress: Not on file   Social Connections: Not on file   Intimate Partner Violence: Not on file   Housing Stability: Low Risk  (2024)    Housing Stability Vital Sign     Unable to Pay for Housing in the Last Year: No     Number of Times Moved in the Last Year: 0     Homeless in the Last Year: No       Allergies   Allergen Reactions    Oxycodone Diarrhea, GI Intolerance, Dizziness, Headache, Irritability and Tremor     Other reaction(s): Anxiety, Nausea and/or vomiting, Other (See Comments)  Dizziness           Current Outpatient Medications:     amoxicillin (AMOXIL) 500 mg  "capsule, TAKE 4 CAPS 1 HOUR BEFORE VISITS, Disp: , Rfl:     Cholecalciferol (Vitamin D3) 125 MCG (5000 UT) TABS, Take by mouth daily, Disp: , Rfl:     clotrimazole-betamethasone (LOTRISONE) 1-0.05 % cream, Apply topically 2 (two) times a day, Disp: 30 g, Rfl: 0    cyanocobalamin (VITAMIN B-12) 100 mcg tablet, Take 1,000 mcg by mouth, Disp: , Rfl:     erythromycin with ethanol (THERAMYCIN) 2 % external solution, APPLY TO AFFECTED AREA ON SCALP EVERY DAY AFTER SHOWERING, Disp: , Rfl:     furosemide (LASIX) 20 mg tablet, TAKE 1 TABLET (20 MG TOTAL) BY MOUTH PER WEEK, Disp: , Rfl:     hydrocortisone 2.5 % cream, APPLY TO AFFECTED AREA ON THE STOMACH TWICE A DAY, Disp: , Rfl:     hydroxyurea (HYDREA) 500 mg capsule, TAKE PILLS 2 DAYS ON 1 DAY OFF, Disp: 180 capsule, Rfl: 3    losartan (COZAAR) 50 mg tablet, TAKE 1 TABLET BY MOUTH EVERY DAY, Disp: 90 tablet, Rfl: 1    omeprazole (PriLOSEC) 20 mg delayed release capsule, TAKE 1 CAPSULE BY MOUTH EVERY DAY, Disp: 90 capsule, Rfl: 1    rosuvastatin (CRESTOR) 5 mg tablet, TAKE 1 TABLET (5 MG TOTAL) BY MOUTH DAILY., Disp: 90 tablet, Rfl: 1    sertraline (ZOLOFT) 50 mg tablet, TAKE 1 TABLET BY MOUTH EVERY DAY, Disp: 90 tablet, Rfl: 1    Sodium Fluoride 5000 PPM 1.1 % GEL, BRUSH TWICE DAILY AFTER BRUSHING SPIT OUT BUT DO NOT RINSE, Disp: , Rfl:         Objective     /68 (BP Location: Left arm, Patient Position: Sitting, Cuff Size: Standard)   Pulse 71   Ht 5' 2\" (1.575 m)   Wt 77.1 kg (170 lb)   LMP  (LMP Unknown)   BMI 31.09 kg/m²     Physical Exam  Vitals and nursing note reviewed.   Constitutional:       Appearance: Normal appearance.   HENT:      Head: Normocephalic and atraumatic.   Cardiovascular:      Rate and Rhythm: Normal rate and regular rhythm.      Pulses:           Dorsalis pedis pulses are 2+ on the right side and 2+ on the left side.      Heart sounds: S1 normal and S2 normal. Murmur heard.      Comments: 2+ bypass pulse in RLE  Pulmonary:      Effort: " Pulmonary effort is normal. No respiratory distress.      Breath sounds: Normal breath sounds.   Abdominal:      General: Abdomen is flat. Bowel sounds are normal. There is no distension.      Palpations: Abdomen is soft. There is no mass.      Tenderness: There is no abdominal tenderness.      Comments: No pulsatile mass or abdominal bruits   Musculoskeletal:         General: Normal range of motion.      Cervical back: Normal range of motion and neck supple.      Right lower leg: Edema present.      Left lower leg: Edema present.   Skin:     General: Skin is warm and dry.   Neurological:      General: No focal deficit present.      Mental Status: She is alert and oriented to person, place, and time.   Psychiatric:         Mood and Affect: Mood normal.         Behavior: Behavior normal.         Thought Content: Thought content normal.         Judgment: Judgment normal.             I have spent a total time of 20 minutes in caring for this patient on the day of the visit/encounter including Diagnostic results, Prognosis, Risks and benefits of tx options, Instructions for management, Patient and family education, Importance of tx compliance, Risk factor reductions, Impressions, Counseling / Coordination of care, Documenting in the medical record, Reviewing / ordering tests, medicine, procedures  , and Obtaining or reviewing history  .    This text is generated with voice recognition software.  There may be translation, syntax, or grammatical errors.  If have any questions, please contact the dictating provider.    Nica Coffman PA-C  [facility]  [phone]

## 2024-11-08 NOTE — ASSESSMENT & PLAN NOTE
84 y.o. female patient w/ PMH significant for HTN, HLD, DM2, and right above knee pop to below-knee popliteal bypass back in 2005 by Dr. Myrick as a result of complications (iatrogenic popliteal artery injury) during total knee replacement c/b delayed wound healing requiring several wound washout/debridements and wound vac therapy.  Patient presents today for routine follow up and results review of non-invasive imaging.      -EDWARD demonstrates diffuse disease in fem-pop arteries bilaterally with a widely patent prox popliteal-distal popliteal artery bypass graft. R MILY 1.13/188/118; L MILY 1.34/167/148.      Plan:  -Patent proximal to distal popliteal artery bypass graft with palpable bypass and DP pulse. No further intervention indicated at this time.   -Continue surveillance with yearly LEAD to evaluate bypass graft and for progression of disease.   -Discussed risk factor modification, including adequate glycemic and lipid control, smoking cessation, blood pressure, and lifestyle modifications.   -Restart daily ASA 81 mg. Continue statin therapy with LDL goal <100.   -Recommend starting a structured walking program 3-5 times/week for 30 minutes. Patient should walk until claudication symptoms occur, rest for 5-10 minutes, then continue to walk. Patient should increase distance each week.   -Follow up in 1 year.   -Instructed patient to call the office with questions, concerns, or new symptoms    Orders:    VAS ARTERIAL DUPLEX- LOWER LIMB BILATERAL; Future

## 2024-11-08 NOTE — PATIENT INSTRUCTIONS
-Continue crestor. Restart aspirin 81 mg daily.   -Repeat leg ultrasound in 1 year.   -Follow up after to review.   -Recommend daily compression stockings. Tubigrip F.   -Elevate the legs throughout the day.

## 2024-11-11 ENCOUNTER — TELEPHONE (OUTPATIENT)
Dept: FAMILY MEDICINE CLINIC | Facility: CLINIC | Age: 84
End: 2024-11-11

## 2024-11-11 NOTE — TELEPHONE ENCOUNTER
----- Message from Valeria Chen MD sent at 11/10/2024  7:07 PM EST -----  Blood work came back stable.

## 2024-12-18 ENCOUNTER — OFFICE VISIT (OUTPATIENT)
Dept: HEMATOLOGY ONCOLOGY | Facility: CLINIC | Age: 84
End: 2024-12-18
Payer: COMMERCIAL

## 2024-12-18 VITALS
SYSTOLIC BLOOD PRESSURE: 122 MMHG | HEART RATE: 69 BPM | TEMPERATURE: 98 F | DIASTOLIC BLOOD PRESSURE: 76 MMHG | OXYGEN SATURATION: 99 % | RESPIRATION RATE: 18 BRPM | BODY MASS INDEX: 31.28 KG/M2 | WEIGHT: 170 LBS | HEIGHT: 62 IN

## 2024-12-18 DIAGNOSIS — D45 POLYCYTHEMIA VERA (HCC): Primary | ICD-10-CM

## 2024-12-18 PROCEDURE — G2211 COMPLEX E/M VISIT ADD ON: HCPCS | Performed by: INTERNAL MEDICINE

## 2024-12-18 PROCEDURE — 99214 OFFICE O/P EST MOD 30 MIN: CPT | Performed by: INTERNAL MEDICINE

## 2024-12-18 RX ORDER — ASPIRIN 81 MG/1
81 TABLET ORAL DAILY
COMMUNITY

## 2024-12-18 NOTE — PROGRESS NOTES
Hematology/Oncology Outpatient Follow-up  Mariah Irwin 84 y.o. female 1940 [id number]    Date:  12/18/2024        Assessment and Plan:  1. Polycythemia vera (HCC) (Primary)  The patient seems to be at goal with hematocrit less than 45% at the current dose of Hydrea 500 mg daily for 2 days and 1 day off.  She will be monitored for treatment related side effects or toxicity.   She was encouraged to go back on the baby aspirin.  We will see her again in 6 months from now for close follow-up.    - CBC and differential; Future  - Comprehensive metabolic panel; Future  - Magnesium; Future        HPI:  The patient came today for a follow-up visit.  She continues to be on Hydrea for the post phenobarb.  She did complain about the muscle cramps of the right lower extremity over the weekend.  Blood work on 11/4/2024 showed hemoglobin of 14.0 with MCV of 108.  White cells was within normal range with platelet count of 136.  Creatinine 1.1 with normal calcium and liver enzymes.  Oncology History Overview Note   Patient had extensive workup for further evaluation of her elevated H&H.  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    She was started on Hydrea 500 mg once a day February 10, 2016.  The dose was increased to 500 mg twice a day which resulted in significant drop of her platelet count.  We did then decrease the dose and put on hold April 4, 2016.    Patient had 2 phlebotomies while she was off the Hydrea.  She is currently on Hydrea 500 mg once a day 2 days on and 1 day off and tolerating this dose well.       Polycythemia vera (HCC)   2/3/2016 Initial Diagnosis    PV (polycythemia vera) (HCC)         Interval history:    ROS: Review of Systems   Constitutional:  Positive for fatigue. Negative for chills and fever.   HENT:  Negative for ear pain and sore throat.    Eyes:  Negative for pain and visual disturbance.   Respiratory:  Positive for cough.  Negative for shortness of breath.    Cardiovascular:  Negative for chest pain and palpitations.   Gastrointestinal:  Negative for abdominal pain and vomiting.   Genitourinary:  Negative for dysuria and hematuria.   Musculoskeletal:  Negative for arthralgias and back pain.   Skin:  Negative for color change and rash.   Neurological:  Positive for numbness. Negative for seizures and syncope.   All other systems reviewed and are negative.      Past Medical History:   Diagnosis Date    CAD (coronary artery disease)        Past Surgical History:   Procedure Laterality Date    BREAST BIOPSY      CAROTID ENDARTARECTOMY Left     CHOLECYSTECTOMY       and     HYSTERECTOMY  1981    KNEE CARTILAGE SURGERY Right 2001    OOPHORECTOMY  2010    REPLACEMENT TOTAL KNEE         Social History     Socioeconomic History    Marital status: /Civil Union     Spouse name: None    Number of children: None    Years of education: None    Highest education level: None   Occupational History    None   Tobacco Use    Smoking status: Former     Current packs/day: 0.00     Average packs/day: 0.3 packs/day for 1 year (0.3 ttl pk-yrs)     Types: Cigarettes     Start date: 1961     Quit date: 1962     Years since quittin.0     Passive exposure: Past (SPOUSE QUIT AT THE SAME TIME)    Smokeless tobacco: Never    Tobacco comments:     no passive smoke exposure   Vaping Use    Vaping status: Never Used   Substance and Sexual Activity    Alcohol use: No    Drug use: No    Sexual activity: Not Currently     Partners: Male   Other Topics Concern    None   Social History Narrative    None     Social Drivers of Health     Financial Resource Strain: Low Risk  (2023)    Overall Financial Resource Strain (CARDIA)     Difficulty of Paying Living Expenses: Not very hard   Food Insecurity: No Food Insecurity (2024)    Nursing - Inadequate Food Risk Classification     Worried About Running Out of Food in the Last Year: Never  true     Ran Out of Food in the Last Year: Never true     Ran Out of Food in the Last Year: Not on file   Transportation Needs: No Transportation Needs (7/29/2024)    PRAPARE - Transportation     Lack of Transportation (Medical): No     Lack of Transportation (Non-Medical): No   Physical Activity: Not on file   Stress: Not on file   Social Connections: Not on file   Intimate Partner Violence: Not on file   Housing Stability: Low Risk  (7/29/2024)    Housing Stability Vital Sign     Unable to Pay for Housing in the Last Year: No     Number of Times Moved in the Last Year: 0     Homeless in the Last Year: No       Family History   Problem Relation Age of Onset    Heart disease Mother     Hypertension Mother     Diabetes type II Mother     Cancer Father     Cystic fibrosis Daughter     Breast cancer Maternal Aunt 53    Breast cancer Cousin 30       Allergies   Allergen Reactions    Oxycodone Diarrhea, GI Intolerance, Dizziness, Headache, Irritability and Tremor     Other reaction(s): Anxiety, Nausea and/or vomiting, Other (See Comments)  Dizziness           Current Outpatient Medications:     amoxicillin (AMOXIL) 500 mg capsule, TAKE 4 CAPS 1 HOUR BEFORE VISITS, Disp: , Rfl:     aspirin (ECOTRIN LOW STRENGTH) 81 mg EC tablet, Take 81 mg by mouth daily, Disp: , Rfl:     Cholecalciferol (Vitamin D3) 125 MCG (5000 UT) TABS, Take by mouth daily, Disp: , Rfl:     clotrimazole-betamethasone (LOTRISONE) 1-0.05 % cream, Apply topically 2 (two) times a day, Disp: 30 g, Rfl: 0    cyanocobalamin (VITAMIN B-12) 100 mcg tablet, Take 1,000 mcg by mouth, Disp: , Rfl:     erythromycin with ethanol (THERAMYCIN) 2 % external solution, APPLY TO AFFECTED AREA ON SCALP EVERY DAY AFTER SHOWERING, Disp: , Rfl:     furosemide (LASIX) 20 mg tablet, TAKE 1 TABLET (20 MG TOTAL) BY MOUTH PER WEEK, Disp: , Rfl:     hydrocortisone 2.5 % cream, APPLY TO AFFECTED AREA ON THE STOMACH TWICE A DAY, Disp: , Rfl:     hydroxyurea (HYDREA) 500 mg capsule,  "TAKE PILLS 2 DAYS ON 1 DAY OFF, Disp: 180 capsule, Rfl: 3    losartan (COZAAR) 50 mg tablet, TAKE 1 TABLET BY MOUTH EVERY DAY, Disp: 90 tablet, Rfl: 1    omeprazole (PriLOSEC) 20 mg delayed release capsule, TAKE 1 CAPSULE BY MOUTH EVERY DAY, Disp: 90 capsule, Rfl: 1    rosuvastatin (CRESTOR) 5 mg tablet, TAKE 1 TABLET (5 MG TOTAL) BY MOUTH DAILY., Disp: 90 tablet, Rfl: 1    sertraline (ZOLOFT) 50 mg tablet, TAKE 1 TABLET BY MOUTH EVERY DAY, Disp: 90 tablet, Rfl: 1    Sodium Fluoride 5000 PPM 1.1 % GEL, BRUSH TWICE DAILY AFTER BRUSHING SPIT OUT BUT DO NOT RINSE, Disp: , Rfl:       Physical Exam:  /76 (BP Location: Right arm, Patient Position: Sitting, Cuff Size: Adult)   Pulse 69   Temp 98 °F (36.7 °C)   Resp 18   Ht 5' 2\" (1.575 m)   Wt 77.1 kg (170 lb)   LMP  (LMP Unknown)   SpO2 99%   BMI 31.09 kg/m²     Physical Exam  Constitutional:       General: She is not in acute distress.     Appearance: She is well-developed. She is not diaphoretic.   HENT:      Head: Normocephalic and atraumatic.      Nose: Nose normal.   Eyes:      General: No scleral icterus.        Right eye: No discharge.         Left eye: No discharge.      Conjunctiva/sclera: Conjunctivae normal.      Pupils: Pupils are equal, round, and reactive to light.   Neck:      Thyroid: No thyromegaly.      Vascular: No JVD.      Trachea: No tracheal deviation.   Cardiovascular:      Rate and Rhythm: Normal rate and regular rhythm.      Heart sounds: Normal heart sounds. No murmur heard.     No friction rub.   Pulmonary:      Effort: Pulmonary effort is normal. No respiratory distress.      Breath sounds: Normal breath sounds. No stridor. No wheezing or rales.   Chest:      Chest wall: No tenderness.   Abdominal:      General: There is no distension.      Palpations: Abdomen is soft. There is no hepatomegaly or splenomegaly.      Tenderness: There is no abdominal tenderness. There is no guarding or rebound.   Musculoskeletal:         General: " No tenderness or deformity. Normal range of motion.      Cervical back: Normal range of motion and neck supple.   Lymphadenopathy:      Cervical: No cervical adenopathy.   Skin:     General: Skin is warm and dry.      Coloration: Skin is not pale.      Findings: No erythema or rash.   Neurological:      Mental Status: She is alert and oriented to person, place, and time.      Cranial Nerves: No cranial nerve deficit.      Coordination: Coordination normal.      Deep Tendon Reflexes: Reflexes are normal and symmetric.   Psychiatric:         Behavior: Behavior normal.         Thought Content: Thought content normal.         Judgment: Judgment normal.           Labs:  Lab Results   Component Value Date    WBC 5.04 11/04/2024    HGB 14.0 11/04/2024    HCT 43.5 11/04/2024     (H) 11/04/2024     (L) 11/04/2024     Lab Results   Component Value Date    K 4.5 11/04/2024     11/04/2024    CO2 31 11/04/2024    BUN 25 11/04/2024    CREATININE 1.14 11/04/2024    GLUF 98 11/04/2024    CALCIUM 9.3 11/04/2024    CORRECTEDCA 9.4 09/15/2022    AST 21 11/04/2024    ALT 14 11/04/2024    ALKPHOS 67 11/04/2024    EGFR 44 11/04/2024     Lab Results   Component Value Date    TSH 1.64 04/26/2018       Patient voiced understanding and agreement in the above discussion. Aware to contact our office with questions/symptoms in the interim.

## 2025-01-02 NOTE — TELEPHONE ENCOUNTER
Called patient on 01/02/2025 to see what these forms are to be completed for as we have received forms with a total of four different claim numbers (two in this encounter) and need to know what conditions go with each. There was no answer, I left a message for the patient to return call.    Pt last seen 8/17/21 refilled as requested with 2 additional refills

## 2025-01-13 ENCOUNTER — NURSE TRIAGE (OUTPATIENT)
Age: 85
End: 2025-01-13

## 2025-01-13 NOTE — TELEPHONE ENCOUNTER
"Patient was told she should schedule apt states \"I can't make it down there, can he just send me something to the pharmacy\"    Her and her  have apts on 2/5    Reason for Disposition   Urinating more frequently than usual (i.e., frequency) OR new-onset of the feeling of an urgent need to urinate (i.e., urgency)    Answer Assessment - Initial Assessment Questions  1. SYMPTOM: \"What's the main symptom you're concerned about?\" (e.g., frequency, incontinence)      Frequency, irritation   2. ONSET: \"When did the  frequency  start?\"      Started Friday \"just a little\"  3. PAIN: \"Is there any pain?\" If Yes, ask: \"How bad is it?\" (Scale: 1-10; mild, moderate, severe)      no  4. CAUSE: \"What do you think is causing the symptoms?\"      Possible UTI   5. OTHER SYMPTOMS: \"Do you have any other symptoms?\" (e.g., blood in urine, fever, flank pain, pain with urination)      NO  6. PREGNANCY: \"Is there any chance you are pregnant?\" \"When was your last menstrual period?\"      NO    Protocols used: Urinary Symptoms-Adult-[place]    "

## 2025-01-14 ENCOUNTER — PATIENT MESSAGE (OUTPATIENT)
Dept: FAMILY MEDICINE CLINIC | Facility: CLINIC | Age: 85
End: 2025-01-14

## 2025-01-14 DIAGNOSIS — N30.00 ACUTE CYSTITIS WITHOUT HEMATURIA: Primary | ICD-10-CM

## 2025-01-14 RX ORDER — CIPROFLOXACIN 250 MG/1
250 TABLET, FILM COATED ORAL EVERY 12 HOURS SCHEDULED
Qty: 10 TABLET | Refills: 0 | Status: SHIPPED | OUTPATIENT
Start: 2025-01-14 | End: 2025-01-19

## 2025-01-14 NOTE — PATIENT COMMUNICATION
Patient called the office back to check on the status of her request. Pt was made aware that medication was called into her pharmacy. No further questions.

## 2025-01-27 ENCOUNTER — RA CDI HCC (OUTPATIENT)
Dept: OTHER | Facility: HOSPITAL | Age: 85
End: 2025-01-27

## 2025-01-27 PROBLEM — E66.09 CLASS 1 OBESITY DUE TO EXCESS CALORIES WITH SERIOUS COMORBIDITY AND BODY MASS INDEX (BMI) OF 33.0 TO 33.9 IN ADULT: Status: RESOLVED | Noted: 2022-07-19 | Resolved: 2025-01-27

## 2025-01-27 PROBLEM — Z01.818 PRE-OP EXAMINATION: Status: RESOLVED | Noted: 2022-01-14 | Resolved: 2025-01-27

## 2025-01-27 PROBLEM — Z98.890: Status: RESOLVED | Noted: 2022-04-08 | Resolved: 2025-01-27

## 2025-01-27 PROBLEM — E66.811 CLASS 1 OBESITY DUE TO EXCESS CALORIES WITH SERIOUS COMORBIDITY AND BODY MASS INDEX (BMI) OF 33.0 TO 33.9 IN ADULT: Status: RESOLVED | Noted: 2022-07-19 | Resolved: 2025-01-27

## 2025-01-27 NOTE — PROGRESS NOTES
HCC coding opportunities          Chart Reviewed number of suggestions sent to Provider: 4   I20.9  E11.51  E11.36  E11.3292    This is a reminder to address (resolve/update/assess) ALL HCC (risk adjustment) codes as found on active problem list for 2025 as patient scores reset to zero SAMANTHA.  Patients Insurance     Medicare Insurance: [employer]

## 2025-02-03 ENCOUNTER — APPOINTMENT (OUTPATIENT)
Dept: LAB | Facility: IMAGING CENTER | Age: 85
End: 2025-02-03
Payer: COMMERCIAL

## 2025-02-03 DIAGNOSIS — N18.32 STAGE 3B CHRONIC KIDNEY DISEASE (HCC): ICD-10-CM

## 2025-02-03 LAB
ERYTHROCYTE [DISTWIDTH] IN BLOOD BY AUTOMATED COUNT: 14.4 % (ref 11.6–15.1)
HCT VFR BLD AUTO: 43.5 % (ref 34.8–46.1)
HGB BLD-MCNC: 13.8 G/DL (ref 11.5–15.4)
MCH RBC QN AUTO: 34.3 PG (ref 26.8–34.3)
MCHC RBC AUTO-ENTMCNC: 31.7 G/DL (ref 31.4–37.4)
MCV RBC AUTO: 108 FL (ref 82–98)
PLATELET # BLD AUTO: 145 THOUSANDS/UL (ref 149–390)
PMV BLD AUTO: 10.3 FL (ref 8.9–12.7)
PTH-INTACT SERPL-MCNC: 102.5 PG/ML (ref 12–88)
RBC # BLD AUTO: 4.02 MILLION/UL (ref 3.81–5.12)
WBC # BLD AUTO: 5.08 THOUSAND/UL (ref 4.31–10.16)

## 2025-02-03 PROCEDURE — 82570 ASSAY OF URINE CREATININE: CPT

## 2025-02-03 PROCEDURE — 80069 RENAL FUNCTION PANEL: CPT

## 2025-02-03 PROCEDURE — 84156 ASSAY OF PROTEIN URINE: CPT

## 2025-02-03 PROCEDURE — 83735 ASSAY OF MAGNESIUM: CPT

## 2025-02-03 PROCEDURE — 84550 ASSAY OF BLOOD/URIC ACID: CPT

## 2025-02-03 PROCEDURE — 85027 COMPLETE CBC AUTOMATED: CPT

## 2025-02-03 PROCEDURE — 83970 ASSAY OF PARATHORMONE: CPT

## 2025-02-03 PROCEDURE — 36415 COLL VENOUS BLD VENIPUNCTURE: CPT

## 2025-02-04 LAB
ALBUMIN SERPL BCG-MCNC: 4.2 G/DL (ref 3.5–5)
ANION GAP SERPL CALCULATED.3IONS-SCNC: 9 MMOL/L (ref 4–13)
BUN SERPL-MCNC: 29 MG/DL (ref 5–25)
CALCIUM SERPL-MCNC: 9.2 MG/DL (ref 8.4–10.2)
CHLORIDE SERPL-SCNC: 106 MMOL/L (ref 96–108)
CO2 SERPL-SCNC: 31 MMOL/L (ref 21–32)
CREAT SERPL-MCNC: 1.32 MG/DL (ref 0.6–1.3)
CREAT UR-MCNC: 106.7 MG/DL
GFR SERPL CREATININE-BSD FRML MDRD: 37 ML/MIN/1.73SQ M
GLUCOSE P FAST SERPL-MCNC: 100 MG/DL (ref 65–99)
MAGNESIUM SERPL-MCNC: 1.9 MG/DL (ref 1.9–2.7)
PHOSPHATE SERPL-MCNC: 3.3 MG/DL (ref 2.3–4.1)
POTASSIUM SERPL-SCNC: 4.4 MMOL/L (ref 3.5–5.3)
PROT UR-MCNC: 16.1 MG/DL
PROT/CREAT UR: 0.2 MG/G{CREAT} (ref 0–0.1)
SODIUM SERPL-SCNC: 146 MMOL/L (ref 135–147)
URATE SERPL-MCNC: 6.7 MG/DL (ref 2–7.5)

## 2025-02-05 ENCOUNTER — OFFICE VISIT (OUTPATIENT)
Dept: FAMILY MEDICINE CLINIC | Facility: CLINIC | Age: 85
End: 2025-02-05
Payer: COMMERCIAL

## 2025-02-05 DIAGNOSIS — I73.9 PERIPHERAL VASCULAR DISEASE, UNSPECIFIED (HCC): ICD-10-CM

## 2025-02-05 DIAGNOSIS — R29.6 FREQUENT FALLS: ICD-10-CM

## 2025-02-05 DIAGNOSIS — I74.3 EMBOLISM AND THROMBOSIS OF ARTERIES OF THE LOWER EXTREMITIES (HCC): ICD-10-CM

## 2025-02-05 DIAGNOSIS — E11.51 TYPE 2 DIABETES MELLITUS WITH DIABETIC PERIPHERAL ANGIOPATHY WITHOUT GANGRENE, WITHOUT LONG-TERM CURRENT USE OF INSULIN (HCC): ICD-10-CM

## 2025-02-05 DIAGNOSIS — I10 ESSENTIAL HYPERTENSION: ICD-10-CM

## 2025-02-05 DIAGNOSIS — I73.89 OTHER SPECIFIED PERIPHERAL VASCULAR DISEASES (HCC): ICD-10-CM

## 2025-02-05 DIAGNOSIS — M25.552 LEFT HIP PAIN: ICD-10-CM

## 2025-02-05 DIAGNOSIS — E66.9 OBESITY (BMI 30-39.9): ICD-10-CM

## 2025-02-05 DIAGNOSIS — R35.0 URINARY FREQUENCY: Primary | ICD-10-CM

## 2025-02-05 DIAGNOSIS — N18.32 STAGE 3B CHRONIC KIDNEY DISEASE (HCC): ICD-10-CM

## 2025-02-05 DIAGNOSIS — F32.0 MAJOR DEPRESSIVE DISORDER, SINGLE EPISODE, MILD (HCC): ICD-10-CM

## 2025-02-05 DIAGNOSIS — D45 POLYCYTHEMIA VERA (HCC): ICD-10-CM

## 2025-02-05 LAB
BACTERIA UR QL AUTO: ABNORMAL /HPF
BILIRUB UR QL STRIP: NEGATIVE
CLARITY UR: CLEAR
COLOR UR: YELLOW
GLUCOSE UR STRIP-MCNC: NEGATIVE MG/DL
HGB UR QL STRIP.AUTO: ABNORMAL
KETONES UR STRIP-MCNC: NEGATIVE MG/DL
LEUKOCYTE ESTERASE UR QL STRIP: ABNORMAL
NITRITE UR QL STRIP: NEGATIVE
NON-SQ EPI CELLS URNS QL MICRO: ABNORMAL /HPF
PH UR STRIP.AUTO: 6 [PH]
PROT UR STRIP-MCNC: ABNORMAL MG/DL
RBC #/AREA URNS AUTO: ABNORMAL /HPF
SP GR UR STRIP.AUTO: 1.02 (ref 1–1.03)
UROBILINOGEN UR STRIP-ACNC: <2 MG/DL
WBC #/AREA URNS AUTO: ABNORMAL /HPF

## 2025-02-05 PROCEDURE — G2211 COMPLEX E/M VISIT ADD ON: HCPCS | Performed by: FAMILY MEDICINE

## 2025-02-05 PROCEDURE — 87086 URINE CULTURE/COLONY COUNT: CPT | Performed by: FAMILY MEDICINE

## 2025-02-05 PROCEDURE — 81001 URINALYSIS AUTO W/SCOPE: CPT | Performed by: FAMILY MEDICINE

## 2025-02-05 PROCEDURE — 99214 OFFICE O/P EST MOD 30 MIN: CPT | Performed by: FAMILY MEDICINE

## 2025-02-05 PROCEDURE — 87147 CULTURE TYPE IMMUNOLOGIC: CPT | Performed by: FAMILY MEDICINE

## 2025-02-06 VITALS
DIASTOLIC BLOOD PRESSURE: 76 MMHG | TEMPERATURE: 97.6 F | HEIGHT: 62 IN | HEART RATE: 81 BPM | BODY MASS INDEX: 31.8 KG/M2 | SYSTOLIC BLOOD PRESSURE: 132 MMHG | RESPIRATION RATE: 16 BRPM | OXYGEN SATURATION: 99 % | WEIGHT: 172.8 LBS

## 2025-02-06 PROBLEM — I73.89 OTHER SPECIFIED PERIPHERAL VASCULAR DISEASES (HCC): Status: ACTIVE | Noted: 2020-08-20

## 2025-02-06 PROBLEM — I70.203 UNSPECIFIED ATHEROSCLEROSIS OF NATIVE ARTERIES OF EXTREMITIES, BILATERAL LEGS (HCC): Status: RESOLVED | Noted: 2021-06-25 | Resolved: 2025-02-06

## 2025-02-06 PROBLEM — E66.9 OBESITY (BMI 30-39.9): Status: ACTIVE | Noted: 2025-02-06

## 2025-02-06 PROBLEM — R29.6 FREQUENT FALLS: Status: ACTIVE | Noted: 2025-02-06

## 2025-02-06 PROBLEM — M25.552 LEFT HIP PAIN: Status: ACTIVE | Noted: 2025-02-06

## 2025-02-06 PROBLEM — R35.0 URINARY FREQUENCY: Status: ACTIVE | Noted: 2025-02-06

## 2025-02-06 LAB — BACTERIA UR CULT: ABNORMAL

## 2025-02-06 NOTE — ASSESSMENT & PLAN NOTE
A1c is well-controlled.  Continue to monitor.  Lab Results   Component Value Date    HGBA1C 5.2 11/04/2024

## 2025-02-06 NOTE — PROGRESS NOTES
Name: Mariah Irwin      : 1940      MRN: [id number]  Encounter Provider: Donovan Chen MD  Encounter Date: 2025   Encounter department:  [facility]    Assessment & Plan  Urinary frequency  I did check her urine and refer her to see urologist.  Orders:  •  Ambulatory Referral to Urology; Future  •  Urine culture; Future  •  UA w Reflex to Microscopic w Reflex to Culture; Future  •  Urine Microscopic    Embolism and thrombosis of arteries of the lower extremities (HCC)  Continue on aspirin.       Major depressive disorder, single episode, mild (HCC)  Stable.  Continue same.  Will continue to monitor.       Polycythemia vera (HCC)  Continue on hydroxyurea and continue to follow-up with hematologist       Obesity (BMI 30-39.9)  It was discussed about low-carb diet.         Stage 3b chronic kidney disease (HCC)  Lab Results   Component Value Date    EGFR 37 2025    EGFR 44 2024    EGFR 38 2024    CREATININE 1.32 (H) 2025    CREATININE 1.14 2024    CREATININE 1.29 2024          Frequent falls  Referral to physical therapy.  Discussed about fall prevention measures.  Orders:  •  Ambulatory Referral to Physical Therapy; Future    Left hip pain  Referral to see Ortho and to physical therapy.  Orders:  •  Ambulatory Referral to Orthopedic Surgery; Future    Type 2 diabetes mellitus with diabetic peripheral angiopathy without gangrene, without long-term current use of insulin (HCC)  A1c is well-controlled.  Continue to monitor.  Lab Results   Component Value Date    HGBA1C 5.2 2024          Peripheral vascular disease, unspecified (HCC)         Other specified peripheral vascular diseases (HCC)   >>ASSESSMENT AND PLAN FOR OTHER SPECIFIED PERIPHERAL VASCULAR DISEASES (HCC) WRITTEN ON 2025 12:01 PM BY DONOVAN CHEN MD    - s/p popliteal-distal bypass surgery.  - Continue routine follow up with vascular surgeon.      >>ASSESSMENT AND  PLAN FOR PERIPHERAL VASCULAR DISEASE, UNSPECIFIED (HCC) WRITTEN ON 2/6/2025 12:00 PM BY DONOVAN FLORES MD    Asymptomatic.  Can follow-up with vascular surgeon.           Essential hypertension  Controlled.  Continue same.  Will continue to monitor.            History of Present Illness     She is here today for follow-up multiple medical problems.  She has been taking her medications.  She denies any side effect.  She has been having problems with urinary frequency lately.  She follow-up with nephrologist does not see urologist.  She denies any blood in the urine.  Denies any flank pain.  No fever no chills.  She has been having more problems with frequent fall and tenderness.  Stated her hip has been bothering her mostly his left hip.      Review of Systems   Constitutional:  Negative for chills and fever.   HENT:  Negative for trouble swallowing.    Eyes:  Negative for visual disturbance.   Respiratory:  Negative for cough and shortness of breath.    Cardiovascular:  Negative for chest pain, palpitations and leg swelling.   Gastrointestinal:  Negative for abdominal pain, constipation and diarrhea.   Endocrine: Negative for cold intolerance and heat intolerance.   Genitourinary:  Positive for frequency. Negative for difficulty urinating and dysuria.   Musculoskeletal:  Positive for arthralgias. Negative for gait problem.   Skin:  Negative for rash.   Neurological:  Positive for weakness. Negative for dizziness, tremors, seizures and headaches.   Hematological:  Negative for adenopathy.   Psychiatric/Behavioral:  Negative for behavioral problems.      Past Medical History:   Diagnosis Date   • CAD (coronary artery disease)    • Class 1 obesity due to excess calories with serious comorbidity and body mass index (BMI) of 33.0 to 33.9 in adult 07/19/2022   • Pre-op examination 01/14/2022   • Status post popliteal-distal bypass surgery 04/08/2022     Past Surgical History:   Procedure Laterality Date   • BREAST  BIOPSY     • CAROTID ENDARTARECTOMY Left    • CHOLECYSTECTOMY       and    • HYSTERECTOMY  1981   • KNEE CARTILAGE SURGERY Right 2001   • OOPHORECTOMY     • REPLACEMENT TOTAL KNEE       Family History   Problem Relation Age of Onset   • Heart disease Mother    • Hypertension Mother    • Diabetes type II Mother    • Cancer Father    • Cystic fibrosis Daughter    • Breast cancer Maternal Aunt 53   • Breast cancer Cousin 30     Social History     Tobacco Use   • Smoking status: Former     Current packs/day: 0.00     Average packs/day: 0.3 packs/day for 1 year (0.3 ttl pk-yrs)     Types: Cigarettes     Start date: 1961     Quit date: 1962     Years since quittin.1     Passive exposure: Past (SPOUSE QUIT AT THE SAME TIME)   • Smokeless tobacco: Never   • Tobacco comments:     no passive smoke exposure   Vaping Use   • Vaping status: Never Used   Substance and Sexual Activity   • Alcohol use: No   • Drug use: No   • Sexual activity: Not Currently     Partners: Male     Current Outpatient Medications on File Prior to Visit   Medication Sig   • aspirin (ECOTRIN LOW STRENGTH) 81 mg EC tablet Take 81 mg by mouth daily   • Cholecalciferol (Vitamin D3) 125 MCG (5000 UT) TABS Take by mouth daily   • clotrimazole-betamethasone (LOTRISONE) 1-0.05 % cream Apply topically 2 (two) times a day   • cyanocobalamin (VITAMIN B-12) 100 mcg tablet Take 1,000 mcg by mouth   • erythromycin with ethanol (THERAMYCIN) 2 % external solution APPLY TO AFFECTED AREA ON SCALP EVERY DAY AFTER SHOWERING   • furosemide (LASIX) 20 mg tablet TAKE 1 TABLET (20 MG TOTAL) BY MOUTH PER WEEK   • hydrocortisone 2.5 % cream APPLY TO AFFECTED AREA ON THE STOMACH TWICE A DAY   • hydroxyurea (HYDREA) 500 mg capsule TAKE PILLS 2 DAYS ON 1 DAY OFF   • losartan (COZAAR) 50 mg tablet TAKE 1 TABLET BY MOUTH EVERY DAY   • omeprazole (PriLOSEC) 20 mg delayed release capsule TAKE 1 CAPSULE BY MOUTH EVERY DAY   • rosuvastatin (CRESTOR) 5 mg  "tablet TAKE 1 TABLET (5 MG TOTAL) BY MOUTH DAILY.   • sertraline (ZOLOFT) 50 mg tablet TAKE 1 TABLET BY MOUTH EVERY DAY   • Sodium Fluoride 5000 PPM 1.1 % GEL BRUSH TWICE DAILY AFTER BRUSHING SPIT OUT BUT DO NOT RINSE   • amoxicillin (AMOXIL) 500 mg capsule TAKE 4 CAPS 1 HOUR BEFORE VISITS (Patient not taking: Reported on 2/5/2025)     Allergies   Allergen Reactions   • Oxycodone Diarrhea, GI Intolerance, Dizziness, Headache, Irritability and Tremor     Other reaction(s): Anxiety, Nausea and/or vomiting, Other (See Comments)  Dizziness       Immunization History   Administered Date(s) Administered   • COVID-19 PFIZER VACCINE 0.3 ML IM 03/05/2021, 03/26/2021, 12/14/2021   • COVID-19, unspecified 03/25/2021   • INFLUENZA 10/14/2015, 10/11/2016, 11/06/2017, 11/13/2018, 11/18/2021, 10/20/2022, 10/04/2023, 11/03/2023   • Influenza Quadrivalent Preservative Free ID 11/20/2013, 10/09/2014, 10/14/2015, 11/09/2015, 10/23/2020   • Influenza Split 12/06/2012, 11/20/2013, 10/09/2014   • Influenza Split High Dose Preservative Free IM 10/14/2015, 11/09/2015, 10/11/2016, 11/06/2017, 11/13/2018, 11/02/2019, 10/23/2020, 11/18/2021   • Influenza, Seasonal Vaccine, Quadrivalent, Adjuvanted, .5e 10/20/2022, 10/04/2023   • Influenza, high dose seasonal 0.7 mL 10/23/2020, 11/18/2021   • Pneumococcal Conjugate 13-Valent 04/12/2016   • Pneumococcal Polysaccharide PPV23 10/18/2002, 11/16/2007     Objective   /76 (BP Location: Left arm, Patient Position: Sitting, Cuff Size: Standard)   Pulse 81   Temp 97.6 °F (36.4 °C) (Tympanic)   Resp 16   Ht 5' 2\" (1.575 m)   Wt 78.4 kg (172 lb 12.8 oz)   LMP  (LMP Unknown)   SpO2 99%   BMI 31.61 kg/m²     Physical Exam  Vitals and nursing note reviewed.   Constitutional:       Appearance: She is well-developed.   HENT:      Head: Normocephalic and atraumatic.   Eyes:      Pupils: Pupils are equal, round, and reactive to light.   Cardiovascular:      Rate and Rhythm: Normal rate and regular " rhythm.      Heart sounds: Normal heart sounds.   Pulmonary:      Effort: Pulmonary effort is normal.      Breath sounds: Normal breath sounds.   Abdominal:      General: Bowel sounds are normal.      Palpations: Abdomen is soft.   Musculoskeletal:         General: Tenderness present.      Cervical back: Normal range of motion and neck supple.   Lymphadenopathy:      Cervical: No cervical adenopathy.   Skin:     General: Skin is warm.   Neurological:      Mental Status: She is alert and oriented to person, place, and time.

## 2025-02-06 NOTE — ASSESSMENT & PLAN NOTE
>>ASSESSMENT AND PLAN FOR OTHER SPECIFIED PERIPHERAL VASCULAR DISEASES (HCC) WRITTEN ON 2/6/2025 12:01 PM BY DONOVAN FLORES MD    - s/p popliteal-distal bypass surgery.  - Continue routine follow up with vascular surgeon.      >>ASSESSMENT AND PLAN FOR PERIPHERAL VASCULAR DISEASE, UNSPECIFIED (HCC) WRITTEN ON 2/6/2025 12:00 PM BY DONOVAN FLORES MD    Asymptomatic.  Can follow-up with vascular surgeon.

## 2025-02-06 NOTE — ASSESSMENT & PLAN NOTE
Referral to see Ortho and to physical therapy.  Orders:  •  Ambulatory Referral to Orthopedic Surgery; Future

## 2025-02-06 NOTE — ASSESSMENT & PLAN NOTE
I did check her urine and refer her to see urologist.  Orders:  •  Ambulatory Referral to Urology; Future  •  Urine culture; Future  •  UA w Reflex to Microscopic w Reflex to Culture; Future  •  Urine Microscopic

## 2025-02-06 NOTE — ASSESSMENT & PLAN NOTE
Referral to physical therapy.  Discussed about fall prevention measures.  Orders:  •  Ambulatory Referral to Physical Therapy; Future

## 2025-02-06 NOTE — ASSESSMENT & PLAN NOTE
Lab Results   Component Value Date    EGFR 37 02/03/2025    EGFR 44 11/04/2024    EGFR 38 07/29/2024    CREATININE 1.32 (H) 02/03/2025    CREATININE 1.14 11/04/2024    CREATININE 1.29 07/29/2024

## 2025-02-07 ENCOUNTER — RESULTS FOLLOW-UP (OUTPATIENT)
Dept: FAMILY MEDICINE CLINIC | Facility: CLINIC | Age: 85
End: 2025-02-07

## 2025-02-07 DIAGNOSIS — R39.9 UTI SYMPTOMS: Primary | ICD-10-CM

## 2025-02-07 DIAGNOSIS — N30.01 ACUTE CYSTITIS WITH HEMATURIA: ICD-10-CM

## 2025-02-07 RX ORDER — PENICILLIN V POTASSIUM 500 MG/1
500 TABLET, FILM COATED ORAL EVERY 6 HOURS SCHEDULED
Qty: 28 TABLET | Refills: 0 | Status: SHIPPED | OUTPATIENT
Start: 2025-02-07 | End: 2025-02-14

## 2025-02-18 ENCOUNTER — OFFICE VISIT (OUTPATIENT)
Dept: OBGYN CLINIC | Facility: CLINIC | Age: 85
End: 2025-02-18
Payer: COMMERCIAL

## 2025-02-18 VITALS — BODY MASS INDEX: 31.65 KG/M2 | WEIGHT: 172 LBS | HEIGHT: 62 IN

## 2025-02-18 DIAGNOSIS — M70.62 GREATER TROCHANTERIC BURSITIS OF LEFT HIP: Primary | ICD-10-CM

## 2025-02-18 PROCEDURE — 99203 OFFICE O/P NEW LOW 30 MIN: CPT | Performed by: PHYSICIAN ASSISTANT

## 2025-02-18 PROCEDURE — 20610 DRAIN/INJ JOINT/BURSA W/O US: CPT | Performed by: PHYSICIAN ASSISTANT

## 2025-02-18 RX ORDER — LIDOCAINE HYDROCHLORIDE 10 MG/ML
4 INJECTION, SOLUTION INFILTRATION; PERINEURAL
Status: COMPLETED | OUTPATIENT
Start: 2025-02-18 | End: 2025-02-18

## 2025-02-18 RX ORDER — BETAMETHASONE SODIUM PHOSPHATE AND BETAMETHASONE ACETATE 3; 3 MG/ML; MG/ML
6 INJECTION, SUSPENSION INTRA-ARTICULAR; INTRALESIONAL; INTRAMUSCULAR; SOFT TISSUE
Status: COMPLETED | OUTPATIENT
Start: 2025-02-18 | End: 2025-02-18

## 2025-02-18 RX ADMIN — LIDOCAINE HYDROCHLORIDE 4 ML: 10 INJECTION, SOLUTION INFILTRATION; PERINEURAL at 12:30

## 2025-02-18 RX ADMIN — BETAMETHASONE SODIUM PHOSPHATE AND BETAMETHASONE ACETATE 6 MG: 3; 3 INJECTION, SUSPENSION INTRA-ARTICULAR; INTRALESIONAL; INTRAMUSCULAR; SOFT TISSUE at 12:30

## 2025-02-18 NOTE — PROGRESS NOTES
"Patient Name:  Mariah Irwin  MRN:  [id number]    Assessment & Plan     Left hip traumatic greater trochanteric bursitis.  Patient was offered and accepted a left hip greater trochanteric bursa corticosteroid injection today.  Referral to physical therapy.  Activities as tolerated.  Follow-up in 3 months.  Consider repeat injection at that time as indicated.    Chief Complaint     Left hip pain    History of the Present Illness     Mariah Irwin is a 84 y.o. female who reports to the office today for evaluation of her left hip.  Patient does have a history of chronic left hip pain but notes worsening pain over the past week or so.  Patient states she did slip on the ice and fall onto her left hip.  She denies hitting her head or loss of consciousness.  After the fall she noted lateral based left hip pain with intermittent radiation distally along the lateral thigh to the knee.  Pain is worse with direct pressure and lying on her side.  She denies any radiation into the groin.  Pain can reach 6 out of 10 in intensity.  She currently takes nothing for pain.  She denies any weakness or instability.  No numbness or tingling.  No fevers or chills.    Physical Exam     Ht 5' 2\" (1.575 m)   Wt 78 kg (172 lb)   LMP  (LMP Unknown)   BMI 31.46 kg/m²     Left hip: Skin intact.  Ecchymosis is appreciated over the lateral hip.  No erythema or significant swelling.  There is tenderness over the greater trochanter.  No tenderness anterior hip.  Hip range of motion is intact and full in all planes without pain.  Negative FADIR test.  Pain noted laterally with JANIE test.  Negative logroll test.  Negative straight leg raise and resisted straight leg raise test.  Sensation is intact distally.    Eyes: Anicteric sclerae.  ENT: Trachea midline.  Lungs: Normal respiratory effort.  CV: Capillary refill is less than 2 seconds.  Skin: Intact without erythema.  Lymph: No palpable lymphadenopathy.  Neuro: Sensation is grossly intact " to light touch.  Psych: Mood and affect are appropriate.    Data Review     I have personally reviewed pertinent films in PACS, and my interpretation follows:    X-rays left hip 2/18/2025: No acute osseous abnormality.  No fracture or dislocation.  Mild degenerative changes appreciated bilateral hip joints.  Visualized lower lumbar spine does exhibit multilevel degenerative changes.    Past Medical History:   Diagnosis Date    Anxiety maybe 40 years    CAD (coronary artery disease)     Cancer (HCC) 8 years    Polycythemia Vera    Chronic kidney disease 5 years    Class 1 obesity due to excess calories with serious comorbidity and body mass index (BMI) of 33.0 to 33.9 in adult 07/19/2022    Coronary artery disease     Diabetes mellitus (HCC) pre-diabetes    Hypertension     Pre-op examination 01/14/2022    Skin disorder     Rosacea and eczema    Status post popliteal-distal bypass surgery 04/08/2022       Past Surgical History:   Procedure Laterality Date    BREAST BIOPSY      CAROTID ENDARTARECTOMY Left     CHOLECYSTECTOMY      2011 and 2012    FOOT SURGERY      HYSTERECTOMY  1981    JOINT REPLACEMENT  2005    Knee replacement with severe complications    KNEE CARTILAGE SURGERY Right 2001    OOPHORECTOMY  2010    REPLACEMENT TOTAL KNEE  2001       Allergies   Allergen Reactions    Oxycodone Diarrhea, GI Intolerance, Dizziness, Headache, Irritability and Tremor     Other reaction(s): Anxiety, Nausea and/or vomiting, Other (See Comments)  Dizziness         Current Outpatient Medications on File Prior to Visit   Medication Sig Dispense Refill    aspirin (ECOTRIN LOW STRENGTH) 81 mg EC tablet Take 81 mg by mouth daily      Cholecalciferol (Vitamin D3) 125 MCG (5000 UT) TABS Take by mouth daily      clotrimazole-betamethasone (LOTRISONE) 1-0.05 % cream Apply topically 2 (two) times a day 30 g 0    cyanocobalamin (VITAMIN B-12) 100 mcg tablet Take 1,000 mcg by mouth      erythromycin with ethanol (THERAMYCIN) 2 % external  solution APPLY TO AFFECTED AREA ON SCALP EVERY DAY AFTER SHOWERING      furosemide (LASIX) 20 mg tablet TAKE 1 TABLET (20 MG TOTAL) BY MOUTH PER WEEK      hydrocortisone 2.5 % cream APPLY TO AFFECTED AREA ON THE STOMACH TWICE A DAY      hydroxyurea (HYDREA) 500 mg capsule TAKE PILLS 2 DAYS ON 1 DAY  capsule 3    losartan (COZAAR) 50 mg tablet TAKE 1 TABLET BY MOUTH EVERY DAY 90 tablet 1    omeprazole (PriLOSEC) 20 mg delayed release capsule TAKE 1 CAPSULE BY MOUTH EVERY DAY 90 capsule 1    rosuvastatin (CRESTOR) 5 mg tablet TAKE 1 TABLET (5 MG TOTAL) BY MOUTH DAILY. 90 tablet 1    sertraline (ZOLOFT) 50 mg tablet TAKE 1 TABLET BY MOUTH EVERY DAY 90 tablet 1    Sodium Fluoride 5000 PPM 1.1 % GEL BRUSH TWICE DAILY AFTER BRUSHING SPIT OUT BUT DO NOT RINSE      amoxicillin (AMOXIL) 500 mg capsule TAKE 4 CAPS 1 HOUR BEFORE VISITS (Patient not taking: Reported on 2025)       No current facility-administered medications on file prior to visit.       Social History     Tobacco Use    Smoking status: Former     Current packs/day: 0.00     Average packs/day: 0.3 packs/day for 1.2 years (0.3 ttl pk-yrs)     Types: Cigarettes     Start date: 1961     Quit date: 1962     Years since quittin.1     Passive exposure: Past (SPOUSE QUIT AT THE SAME TIME)    Smokeless tobacco: Never    Tobacco comments:     no passive smoke exposure   Vaping Use    Vaping status: Never Used   Substance Use Topics    Alcohol use: No    Drug use: No       Family History   Problem Relation Age of Onset    Heart disease Mother     Hypertension Mother     Diabetes type II Mother     Osteoporosis Mother     Cancer Father     Cystic fibrosis Daughter     Breast cancer Maternal Aunt 53    Breast cancer Cousin 30       Review of Systems     As stated in the HPI. All other systems reviewed and are negative.      Large joint arthrocentesis: L greater trochanteric bursa  Procedure Details  Location: hip - L greater trochanteric  bursa  Needle size: 22 G  Ultrasound guidance: no  Approach: lateral  Medications administered: 4 mL lidocaine 1 %; 6 mg betamethasone acetate-betamethasone sodium phosphate 6 (3-3) mg/mL    Patient tolerance: patient tolerated the procedure well with no immediate complications  Dressing:  Sterile dressing applied

## 2025-02-21 ENCOUNTER — EVALUATION (OUTPATIENT)
Dept: PHYSICAL THERAPY | Facility: REHABILITATION | Age: 85
End: 2025-02-21
Payer: COMMERCIAL

## 2025-02-21 DIAGNOSIS — R29.6 FREQUENT FALLS: ICD-10-CM

## 2025-02-21 PROCEDURE — 97161 PT EVAL LOW COMPLEX 20 MIN: CPT

## 2025-02-21 PROCEDURE — 97110 THERAPEUTIC EXERCISES: CPT

## 2025-02-21 NOTE — PROGRESS NOTES
PT Evaluation     Today's date: 2025  Patient name: Mariah Irwin  : 1940  MRN: [id number]  Referring provider: Valeria Chen MD  Dx:   Encounter Diagnosis     ICD-10-CM    1. Frequent falls  R29.6 Ambulatory Referral to Physical Therapy                     Assessment  Impairments: abnormal gait, abnormal muscle firing, abnormal muscle tone, abnormal or restricted ROM, abnormal movement, activity intolerance, impaired balance, impaired physical strength, lacks appropriate home exercise program, pain with function, poor posture  and poor body mechanics    Assessment details: Pt, Mariah Irwin , is a 84 y.o.  presenting to physical therapy on this date for an initial evaluation with reports of frequent falls and difficulty with her balance. Upon eval on this date, pt presented with increased TUG time, increased 5xSTS Time, decreased SLS and tandem balance stance time as well as increased postural sway during balance testing. Pt does ambulate with a SPC but it able to ambulate independently without assistive device however with increased postural sway, decreased gait speed, and increased time taken to complete ambulation. Pt was provided with initial HEP targeting strengthening and general mobility exercises. At this time, pt would benefit from skilled OP PT to work on deficits listed above and improve overall functional mobility with decreased reports of pain and compensation patterns. POC was discussed with pt, pt verbalized understanding and is in agreement. Thank you for this referral.    Goals  STG:   Pt will be able to complete 5xSTS in 20 seconds in 3 weeks to demonstrate improvements to functional transfers and mobility   Pt will be I with initial HEP to progress towards independence with exercise     LTG:   Pt will decrease TUG time by 5 seconds or greater by d/c to demonstrate improved tolerance to ambulation and functional mobility   Pt will decrease 5xSTS time to 15 seconds or less to  demonstrate decreased risk for falls by d/c   Pt will improve BLE strength to 5/5 by d/c for increased ease and safety with functional mobility and activities   Pt will be I with modified/updated HEP and demonstrate ability to complete with confidence and proper mechanics/form to safely be d/c from skilled OP PT and continue with exercises on their own        Plan  Patient would benefit from: PT eval and skilled physical therapy  Planned modality interventions: cryotherapy, TENS and thermotherapy: hydrocollator packs    Planned therapy interventions: body mechanics training, functional ROM exercises, home exercise program, therapeutic exercise, therapeutic activities, stretching, strengthening, postural training, patient education, neuromuscular re-education, motor coordination training, nerve gliding, balance and balance/weight bearing training    Frequency: 1-2x week  Duration in weeks: 8  Treatment plan discussed with: patient        Subjective Evaluation    History of Present Illness  Mechanism of injury: Pt reports that she feels like she has been falling more recently. Pt reports that her most recent fall was last week. Pt reports that she uses her cane to walk around outside of the house but furniture walks in the house. Pt reports that she now has a ringing in the ears which she feels contributes to her balance but denies any dizziness. She reports that her most recent fall she slipped. Pt denies that her legs are weak but states she has numbness in her right leg and just got a cortisone injection in her left hip. Pt reports that she had a R TKA and had a lot of problems in recovery and that was 20 years ago.   Patient Goals  Patient goals for therapy: improved balance, increased motion, increased strength, independence with ADLs/IADLs and return to sport/leisure activities  Patient goal: be less wobbly, be more steady  Pain  Quality: wobbly, unsteady.    Social Support  Steps to enter house: yes (1 step, no  railing, uses hook on door)  Stairs in house: yes (flight of stairs to upstairs and basement, railing on both sides)   Lives in: multiple-level home  Lives with: spouse          Objective     Strength/Myotome Testing     Left Hip   Planes of Motion   Flexion: 4  Abduction: 4    Right Hip   Planes of Motion   Flexion: 4  Abduction: 4    Left Knee   Flexion: 4+  Extension: 4    Right Knee   Flexion: 4  Extension: 4    Ambulation   Weight-Bearing Status   Weight-Bearing Status (Left): full weight bearing   Weight-Bearing Status (Right): full weight-bearing    Assistive device used: single point cane    Observational Gait   Gait: antalgic   Decreased walking speed and stride length.     Comments   Pt is able to complete transfers independently however with increased time taken to complete movement patterns     Functional Assessment        Comments  5xSTS: BUE push off 26.55 seconds     TUG: with cane 17.9 seconds, no cane 18.5 seconds     SLS  R 3 seconds   L 4 seconds     Tandem stance   R forward 8 seconds   L forward 4 seconds     Increased postural sway with balance testing              Precautions:   Patient Active Problem List   Diagnosis    Polycythemia vera (HCC)    Other vitamin B12 deficiency anemias    Allergic rhinitis    Angina pectoris (HCC)    Anxiety    Arteriosclerosis of coronary artery    Benign neoplasm of colon    Carotid stenosis    Cholecystitis    Colon polyp    Gastroesophageal reflux disease    Herpes zoster    Hyperlipidemia    Osteoarthritis    Morbid obesity (HCC)    Lymphedema    Posterior vitreous detachment    Renal failure    Retinal detachment    Vitamin D deficiency    Hypertensive kidney disease with chronic kidney disease, stage 1-4 or unspecified chronic kidney disease    Skin infection    Psoriasis    Injury of right popliteal artery    Stage 3b chronic kidney disease (HCC)    Chronic pain of left ankle    Dermatitis    Major depressive disorder, single episode, mild (HCC)    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 edema, without long-term current use of insulin (HCC)    Acute left-sided low back pain with left-sided sciatica    Type 2 diabetes mellitus with chronic kidney disease (HCC)    Essential hypertension    Blister of scalp with infection    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)    Age-related nuclear cataract, bilateral    Type 2 diabetes mellitus with diabetic peripheral angiopathy without gangrene (HCC)    Atherosclerosis of native coronary artery of native heart with angina pectoris (HCC)    Other specified peripheral vascular diseases (HCC)    Embolism and thrombosis of arteries of the lower extremities (HCC)    Urinary frequency    Frequent falls    Left hip pain    Obesity (BMI 30-39.9)         2/21            Visit number  1            FOTO IE            IE/RE IE               Manuals                                                                 Exercises             Bike              Stand hip abd, ext  demo            STS  X5             HR/TR  demo            Side stepping              Step ups              Obstacle course              Esdras walking fwd, lateral              Biodex              SLS              Tandem stance              Marching on foam                                                                                                                                               Modalities                                             Access Code: [email or web address]  URL: [email or web address]  Date: 02/21/2025  Prepared by: Gi Hein    Exercises  - Sit to Stand with Armchair  - 2 x daily - 7 x weekly - 5 reps  - Standing Hip Abduction with Counter Support  - 2 x daily - 7 x weekly - 10 reps  - Standing Hip Extension with Counter Support  - 2 x daily - 7 x weekly - 10 reps  - Heel Toe Raises with Counter Support  - 2 x daily - 7 x weekly - 15 reps

## 2025-02-26 DIAGNOSIS — I10 ESSENTIAL HYPERTENSION: ICD-10-CM

## 2025-02-26 DIAGNOSIS — K21.9 GASTROESOPHAGEAL REFLUX DISEASE WITHOUT ESOPHAGITIS: ICD-10-CM

## 2025-02-26 RX ORDER — OMEPRAZOLE 20 MG/1
20 CAPSULE, DELAYED RELEASE ORAL DAILY
Qty: 90 CAPSULE | Refills: 1 | Status: SHIPPED | OUTPATIENT
Start: 2025-02-26

## 2025-02-26 RX ORDER — LOSARTAN POTASSIUM 50 MG/1
50 TABLET ORAL DAILY
Qty: 90 TABLET | Refills: 1 | Status: SHIPPED | OUTPATIENT
Start: 2025-02-26

## 2025-02-27 ENCOUNTER — OFFICE VISIT (OUTPATIENT)
Dept: PHYSICAL THERAPY | Facility: REHABILITATION | Age: 85
End: 2025-02-27
Payer: COMMERCIAL

## 2025-02-27 DIAGNOSIS — R29.6 FREQUENT FALLS: Primary | ICD-10-CM

## 2025-02-27 PROCEDURE — 97110 THERAPEUTIC EXERCISES: CPT

## 2025-02-27 NOTE — PROGRESS NOTES
Daily Note     Today's date: 2025  Patient name: Mariah Irwin  : 1940  MRN: [id number]  Referring provider: Valeria Chen MD  Dx:   Encounter Diagnosis     ICD-10-CM    1. Frequent falls  R29.6                      Subjective: Pt reports that she has been feeling okay but still wobbly. Pt denies any falls since her last visit.       Objective: See treatment diary below      Assessment: Pt tolerated treatment well. Pt was able to complete all exercises to her tolerance and with fatigue reported during and post. Pt was educated that due to completing new exercises on this date, she may feel some soreness later like she did a workout but it should not be an increase in pain. Pt was educated to inform therapist at next appointment how she felt so that plan of care can be adjusted as needed, pt verbalized understanding. Patient demonstrated fatigue post treatment, exhibited good technique with therapeutic exercises, and would benefit from continued PT.       Plan: Continue per plan of care.  Progress treatment as tolerated.       Precautions:   Patient Active Problem List   Diagnosis    Polycythemia vera (HCC)    Other vitamin B12 deficiency anemias    Allergic rhinitis    Angina pectoris (HCC)    Anxiety    Arteriosclerosis of coronary artery    Benign neoplasm of colon    Carotid stenosis    Cholecystitis    Colon polyp    Gastroesophageal reflux disease    Herpes zoster    Hyperlipidemia    Osteoarthritis    Morbid obesity (HCC)    Lymphedema    Posterior vitreous detachment    Renal failure    Retinal detachment    Vitamin D deficiency    Hypertensive kidney disease with chronic kidney disease, stage 1-4 or unspecified chronic kidney disease    Skin infection    Psoriasis    Injury of right popliteal artery    Stage 3b chronic kidney disease (HCC)    Chronic pain of left ankle    Dermatitis    Major depressive disorder, single episode, mild (HCC)    Type 2 diabetes mellitus with left eye affected  "by mild nonproliferative retinopathy without macular edema, without long-term current use of insulin (HCC)    Acute left-sided low back pain with left-sided sciatica    Type 2 diabetes mellitus with chronic kidney disease (HCC)    Essential hypertension    Blister of scalp with infection    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)    Age-related nuclear cataract, bilateral    Type 2 diabetes mellitus with diabetic peripheral angiopathy without gangrene (HCC)    Atherosclerosis of native coronary artery of native heart with angina pectoris (HCC)    Other specified peripheral vascular diseases (HCC)    Embolism and thrombosis of arteries of the lower extremities (HCC)    Urinary frequency    Frequent falls    Left hip pain    Obesity (BMI 30-39.9)         2/21 2/27           Visit number  1            FOTO IE            IE/RE IE               Manuals                                                                 Exercises             Bike   L1 x3 min            Stand hip abd, ext  demo X10 ea            STS  X5             HR/TR  demo            Side stepping   X3 laps            Step ups   Seated hip abd PTB, add with ball 5\"x10            Obstacle course   Seated march PTB 5\" x10 b/l           Esdras walking fwd, lateral   Yellow fwd x3 laps, 2Y2O lateral x3 laps           Biodex              SLS              Tandem stance              Marching on foam               Stand knee flex 5\" x10 b/l                                                                                                                                 Modalities                                             Access Code: [email or web address]  URL: [email or web address]  Date: 02/21/2025  Prepared by: Gi Hein    Exercises  - Sit to Stand with Armchair  - 2 x daily - 7 x weekly - 5 reps  - Standing Hip Abduction with Counter Support  - 2 x daily - 7 x weekly - 10 reps  - Standing Hip Extension with Counter Support  - 2 x daily - 7 x " weekly - 10 reps  - Heel Toe Raises with Counter Support  - 2 x daily - 7 x weekly - 15 reps

## 2025-03-06 ENCOUNTER — OFFICE VISIT (OUTPATIENT)
Dept: PHYSICAL THERAPY | Facility: REHABILITATION | Age: 85
End: 2025-03-06
Payer: COMMERCIAL

## 2025-03-06 DIAGNOSIS — R29.6 FREQUENT FALLS: Primary | ICD-10-CM

## 2025-03-06 PROCEDURE — 97110 THERAPEUTIC EXERCISES: CPT

## 2025-03-06 NOTE — PROGRESS NOTES
Daily Note     Today's date: 3/6/2025  Patient name: Mariah Irwin  : 1940  MRN: [id number]  Referring provider: Valeria Chen MD  Dx:   Encounter Diagnosis     ICD-10-CM    1. Frequent falls  R29.6           Start Time: 935  Stop Time: 1010  Total time in clinic (min): 35 minutes    Subjective: Patient reports no new complaints.       Objective: See treatment diary below      Assessment: Patient reports quad fatigue with bike warm up. Patient unable to perform toe raises standing, but was able to perform seated without difficulty. Biodex added today and patient performed hurdles without UE assitance but with CGA. Pt will benefit from continued skilled PT intervention in order to address remaining limitations and to restore maximal function.          Plan: Continue per plan of care.      Precautions:   Patient Active Problem List   Diagnosis    Polycythemia vera (HCC)    Other vitamin B12 deficiency anemias    Allergic rhinitis    Angina pectoris (HCC)    Anxiety    Arteriosclerosis of coronary artery    Benign neoplasm of colon    Carotid stenosis    Cholecystitis    Colon polyp    Gastroesophageal reflux disease    Herpes zoster    Hyperlipidemia    Osteoarthritis    Morbid obesity (HCC)    Lymphedema    Posterior vitreous detachment    Renal failure    Retinal detachment    Vitamin D deficiency    Hypertensive kidney disease with chronic kidney disease, stage 1-4 or unspecified chronic kidney disease    Skin infection    Psoriasis    Injury of right popliteal artery    Stage 3b chronic kidney disease (HCC)    Chronic pain of left ankle    Dermatitis    Major depressive disorder, single episode, mild (HCC)    Type 2 diabetes mellitus with left eye affected by mild nonproliferative retinopathy without macular edema, without long-term current use of insulin (HCC)    Acute left-sided low back pain with left-sided sciatica    Type 2 diabetes mellitus with chronic kidney disease (HCC)    Essential  "hypertension    Blister of scalp with infection    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)    Age-related nuclear cataract, bilateral    Type 2 diabetes mellitus with diabetic peripheral angiopathy without gangrene (HCC)    Atherosclerosis of native coronary artery of native heart with angina pectoris (HCC)    Other specified peripheral vascular diseases (HCC)    Embolism and thrombosis of arteries of the lower extremities (HCC)    Urinary frequency    Frequent falls    Left hip pain    Obesity (BMI 30-39.9)         2/21 2/27 3/6          Visit number  1  3          FOTO IE            IE/RE IE               Manuals                                                                 Exercises             Bike   L1 x3 min  L1 x3 min           Stand hip abd, ext  demo X10 ea  2x10 each          STS  X5             HR/TR  demo  X10    TR seated 5\" x10          Side stepping   X3 laps  X 3 laps          Step ups   Seated hip abd PTB, add with ball 5\"x10  Seated hip abd PTB, add with ball 5\"x10           Obstacle course   Seated march PTB 5\" x10 b/l Seated march PTB 5\" x10 b/l          Esdras walking fwd, lateral   Yellow fwd x3 laps, 2Y2O lateral x3 laps Yellow fwd x3 laps no UE assist CGA, 2Y2O lateral x3 laps          Biodex    LOS x2 38%     Maze x2          SLS              Tandem stance              Marching on foam               Stand knee flex 5\" x10 b/l  Stand knee flex 5\" x10 b/l                                                                                                                                Modalities                                             Access Code: [email or web address]  URL: [email or web address]  Date: 02/21/2025  Prepared by: Gi Hein    Exercises  - Sit to Stand with Armchair  - 2 x daily - 7 x weekly - 5 reps  - Standing Hip Abduction with Counter Support  - 2 x daily - 7 x weekly - 10 reps  - Standing Hip Extension with Counter Support  - 2 x daily - 7 x weekly - 10 " reps  - Heel Toe Raises with Counter Support  - 2 x daily - 7 x weekly - 15 reps

## 2025-03-12 ENCOUNTER — OFFICE VISIT (OUTPATIENT)
Dept: PHYSICAL THERAPY | Facility: REHABILITATION | Age: 85
End: 2025-03-12
Payer: COMMERCIAL

## 2025-03-12 DIAGNOSIS — R29.6 FREQUENT FALLS: Primary | ICD-10-CM

## 2025-03-12 PROCEDURE — 97110 THERAPEUTIC EXERCISES: CPT

## 2025-03-12 NOTE — PROGRESS NOTES
Daily Note     Today's date: 3/12/2025  Patient name: Mariah Irwin  : 1940  MRN: [id number]  Referring provider: Valeria Chen MD  Dx:   Encounter Diagnosis     ICD-10-CM    1. Frequent falls  R29.6           Start Time: 1110  Stop Time: 1150  Total time in clinic (min): 40 minutes    Subjective: Pt reports feeling good after last session and offers no new complaints.       Objective: See treatment diary below.      Assessment: Patient was supervised by Cristal Carmen DPT for first 10 minutes of the session. Tolerated treatment well. Patient noted fatigue following the bike for a warm-up, however was able to go for a minute longer. Patient able to tolerate increased weight and repetitions for hip strengthening exercises with good form and control. Patient demonstrated improved weight shifting and balance on Biodex with improved score. Able to add leg press, knee flexion, knee extension on SA machines with good tolerance and control. Pt was educated that due to completing new exercises on this date, she may feel some soreness later like she did a workout but it should not be an increase in pain. Pt was educated to inform therapist at next appointment how she felt so that plan of care can be adjusted as needed, pt verbalized understanding. Patient demonstrated fatigue post treatment, exhibited good technique with therapeutic exercises, and would benefit from continued PT.      Plan: Progress treatment as tolerated.       Precautions:   Patient Active Problem List   Diagnosis    Polycythemia vera (HCC)    Other vitamin B12 deficiency anemias    Allergic rhinitis    Angina pectoris (HCC)    Anxiety    Arteriosclerosis of coronary artery    Benign neoplasm of colon    Carotid stenosis    Cholecystitis    Colon polyp    Gastroesophageal reflux disease    Herpes zoster    Hyperlipidemia    Osteoarthritis    Morbid obesity (HCC)    Lymphedema    Posterior vitreous detachment    Renal failure    Retinal  "detachment    Vitamin D deficiency    Hypertensive kidney disease with chronic kidney disease, stage 1-4 or unspecified chronic kidney disease    Skin infection    Psoriasis    Injury of right popliteal artery    Stage 3b chronic kidney disease (HCC)    Chronic pain of left ankle    Dermatitis    Major depressive disorder, single episode, mild (HCC)    Type 2 diabetes mellitus with left eye affected by mild nonproliferative retinopathy without macular edema, without long-term current use of insulin (HCC)    Acute left-sided low back pain with left-sided sciatica    Type 2 diabetes mellitus with chronic kidney disease (HCC)    Essential hypertension    Blister of scalp with infection    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)    Age-related nuclear cataract, bilateral    Type 2 diabetes mellitus with diabetic peripheral angiopathy without gangrene (HCC)    Atherosclerosis of native coronary artery of native heart with angina pectoris (HCC)    Other specified peripheral vascular diseases (HCC)    Embolism and thrombosis of arteries of the lower extremities (HCC)    Urinary frequency    Frequent falls    Left hip pain    Obesity (BMI 30-39.9)         2/21 2/27 3/6 3/12         Visit number  1  3 4         FOTO IE            IE/RE IE               Manuals                                                                 Exercises             Bike   L1 x3 min  L1 x3 min  L1x4 min         Stand hip abd, ext  demo X10 ea  2x10 each 1.5# 2x10 ea         STS  X5             HR/TR  demo  X10    TR seated 5\" x10 x15    TR seated 5\"x15         Side stepping   X3 laps  X 3 laps 1.5# x3 laps         Step ups   Seated hip abd PTB, add with ball 5\"x10  Seated hip abd PTB, add with ball 5\"x10  Seated hip abd PTB, add with ball 5\"x20         Obstacle course   Seated march PTB 5\" x10 b/l Seated march PTB 5\" x10 b/l Seated march PTB 5\" x10 b/l         Esdras walking fwd, lateral   Yellow fwd x3 laps, 2Y2O lateral x3 laps " "Yellow fwd x3 laps no UE assist CGA, 2Y2O lateral x3 laps Yellow fwd x3 laps no UE assist CGA, 2Y2O lateral x3 laps         Biodex    LOS x2 38%     Maze x2 LOS x2 77%     Maze x2         SLS              Tandem stance              Marching on foam               Stand knee flex 5\" x10 b/l  Stand knee flex 5\" x10 b/l  Stand knee flex 1.5#,5\" x10 b/l          Leg Press    22# 2x10         SA machine    11# ext 2x10    11# flx  2x10                                                                                                    Modalities                                             Access Code: [email or web address]  URL: [email or web address]  Date: 02/21/2025  Prepared by: Gi Hein    Exercises  - Sit to Stand with Armchair  - 2 x daily - 7 x weekly - 5 reps  - Standing Hip Abduction with Counter Support  - 2 x daily - 7 x weekly - 10 reps  - Standing Hip Extension with Counter Support  - 2 x daily - 7 x weekly - 10 reps  - Heel Toe Raises with Counter Support  - 2 x daily - 7 x weekly - 15 reps                 Patient primarily treated by MARIA DE JESUS [place] with direct supervision throughout session by clinical instructor/staff therapist Gi Hein DPT.   "

## 2025-03-14 ENCOUNTER — OFFICE VISIT (OUTPATIENT)
Dept: PHYSICAL THERAPY | Facility: REHABILITATION | Age: 85
End: 2025-03-14
Payer: COMMERCIAL

## 2025-03-14 DIAGNOSIS — R29.6 FREQUENT FALLS: Primary | ICD-10-CM

## 2025-03-14 PROCEDURE — 97110 THERAPEUTIC EXERCISES: CPT

## 2025-03-14 NOTE — PROGRESS NOTES
Daily Note     Today's date: 3/14/2025  Patient name: Mariah Irwin  : 1940  MRN: [id number]  Referring provider: Valeria Chen MD  Dx:   Encounter Diagnosis     ICD-10-CM    1. Frequent falls  R29.6           Start Time: 945  Stop Time: 1030  Total time in clinic (min): 45 minutes    Subjective: Pt reports that she feels tired, but good today. Following last session, patient notes that she felt good and denied any soreness. Otherwise, no new changes.       Objective: See treatment diary below.      Assessment: Tolerated treatment well. Patient able to tolerate increased repetitions and sets with good control and form. Fatigue noted with SA flexion and extension towards end of third set, with a decrease in eccentric control. Patient demonstrated fatigue post treatment, exhibited good technique with therapeutic exercises, and would benefit from continued PT.      Plan: Progress treatment as tolerated.       Precautions:   Patient Active Problem List   Diagnosis    Polycythemia vera (HCC)    Other vitamin B12 deficiency anemias    Allergic rhinitis    Angina pectoris (HCC)    Anxiety    Arteriosclerosis of coronary artery    Benign neoplasm of colon    Carotid stenosis    Cholecystitis    Colon polyp    Gastroesophageal reflux disease    Herpes zoster    Hyperlipidemia    Osteoarthritis    Morbid obesity (HCC)    Lymphedema    Posterior vitreous detachment    Renal failure    Retinal detachment    Vitamin D deficiency    Hypertensive kidney disease with chronic kidney disease, stage 1-4 or unspecified chronic kidney disease    Skin infection    Psoriasis    Injury of right popliteal artery    Stage 3b chronic kidney disease (HCC)    Chronic pain of left ankle    Dermatitis    Major depressive disorder, single episode, mild (HCC)    Type 2 diabetes mellitus with left eye affected by mild nonproliferative retinopathy without macular edema, without long-term current use of insulin (HCC)    Acute left-sided  "low back pain with left-sided sciatica    Type 2 diabetes mellitus with chronic kidney disease (HCC)    Essential hypertension    Blister of scalp with infection    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)    Age-related nuclear cataract, bilateral    Type 2 diabetes mellitus with diabetic peripheral angiopathy without gangrene (HCC)    Atherosclerosis of native coronary artery of native heart with angina pectoris (HCC)    Other specified peripheral vascular diseases (HCC)    Embolism and thrombosis of arteries of the lower extremities (HCC)    Urinary frequency    Frequent falls    Left hip pain    Obesity (BMI 30-39.9)         2/21 2/27 3/6 3/12 3/14        Visit number  1  3 4 5        FOTO IE            IE/RE IE               Manuals                                                                 Exercises             Bike   L1 x3 min  L1 x3 min  L1x4 min L1x4 min        Stand hip abd, ext  demo X10 ea  2x10 each 1.5# 2x10 ea 1.5# 2x10 ea        STS  X5             HR/TR  demo  X10    TR seated 5\" x10 x15    TR seated 5\"x15 x15    TR seated 5\"x15        Side stepping   X3 laps  X 3 laps 1.5# x3 laps 1.5# x3 laps        Step ups   Seated hip abd PTB, add with ball 5\"x10  Seated hip abd PTB, add with ball 5\"x10  Seated hip abd PTB, add with ball 5\"x20 Seated hip abd PTB, add with ball 5\"x20        Obstacle course   Seated march PTB 5\" x10 b/l Seated march PTB 5\" x10 b/l Seated march PTB 5\" x10 b/l Seated march PTB 5\" x10 b/l        Esdras walking fwd, lateral   Yellow fwd x3 laps, 2Y2O lateral x3 laps Yellow fwd x3 laps no UE assist CGA, 2Y2O lateral x3 laps Yellow fwd x3 laps no UE assist CGA, 2Y2O lateral x3 laps Yellow fwd x3 laps no UE assist CGA, 2Y2O lateral x3 lap        Biodex    LOS x2 38%     Maze x2 LOS x2 77%     Maze x2 LOS x2 77%     Maze x2        SLS              Tandem stance              Marching on foam               Stand knee flex 5\" x10 b/l  Stand knee flex 5\" x10 b/l  Stand knee " "flex 1.5#,5\" x10 b/l  Stand knee flex 1.5#,5\" 2x10 b/l         Leg Press    22# 2x10 22# 3x10        SA machine    11# ext 2x10    11# flx  2x10 11# flx  2x10      11# flx  3x10                                                                                                   Modalities                                             Access Code: [email or web address]  URL: [email or web address]  Date: 02/21/2025  Prepared by: Gi Hein    Exercises  - Sit to Stand with Armchair  - 2 x daily - 7 x weekly - 5 reps  - Standing Hip Abduction with Counter Support  - 2 x daily - 7 x weekly - 10 reps  - Standing Hip Extension with Counter Support  - 2 x daily - 7 x weekly - 10 reps  - Heel Toe Raises with Counter Support  - 2 x daily - 7 x weekly - 15 reps               Patient primarily treated by MARIA DE JESUS [place] with direct supervision throughout session by clinical instructor/staff therapist Gi Hein, DPT.   "

## 2025-03-19 ENCOUNTER — OFFICE VISIT (OUTPATIENT)
Dept: PHYSICAL THERAPY | Facility: REHABILITATION | Age: 85
End: 2025-03-19
Payer: COMMERCIAL

## 2025-03-19 DIAGNOSIS — R29.6 FREQUENT FALLS: Primary | ICD-10-CM

## 2025-03-19 PROCEDURE — 97110 THERAPEUTIC EXERCISES: CPT

## 2025-03-19 NOTE — PROGRESS NOTES
Daily Note     Today's date: 3/19/2025  Patient name: Mariah Irwin  : 1940  MRN: [id number]  Referring provider: Valeria Chen MD  Dx:   Encounter Diagnosis     ICD-10-CM    1. Frequent falls  R29.6           Start Time: 1110  Stop Time: 1155  Total time in clinic (min): 45 minutes    Subjective: Pt reports feeling good today. Following last treatment session, she noted some soreness on the outside of her hip, but states it didn't last long. Otherwise, no new reports.       Objective: See treatment diary below.      Assessment: Tolerated treatment well. Patient able to tolerate increase in weight for standing exercises, while maintaining proper form and control. Progressed to green theraband for seated hip strengthening exercises with good tolerance, however fatigue noted towards end of repetitions. Increased weight on leg press with good tolerance. Updated and printed HEP, issued green theraband. Patient demonstrated fatigue post treatment, exhibited good technique with therapeutic exercises, and would benefit from continued PT.      Plan: Progress treatment as tolerated.  Incorporate more dynamic balance next session.      Precautions:   Patient Active Problem List   Diagnosis    Polycythemia vera (HCC)    Other vitamin B12 deficiency anemias    Allergic rhinitis    Angina pectoris (HCC)    Anxiety    Arteriosclerosis of coronary artery    Benign neoplasm of colon    Carotid stenosis    Cholecystitis    Colon polyp    Gastroesophageal reflux disease    Herpes zoster    Hyperlipidemia    Osteoarthritis    Morbid obesity (HCC)    Lymphedema    Posterior vitreous detachment    Renal failure    Retinal detachment    Vitamin D deficiency    Hypertensive kidney disease with chronic kidney disease, stage 1-4 or unspecified chronic kidney disease    Skin infection    Psoriasis    Injury of right popliteal artery    Stage 3b chronic kidney disease (HCC)    Chronic pain of left ankle    Dermatitis    Major  "depressive disorder, single episode, mild (HCC)    Type 2 diabetes mellitus with left eye affected by mild nonproliferative retinopathy without macular edema, without long-term current use of insulin (HCC)    Acute left-sided low back pain with left-sided sciatica    Type 2 diabetes mellitus with chronic kidney disease (HCC)    Essential hypertension    Blister of scalp with infection    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)    Age-related nuclear cataract, bilateral    Type 2 diabetes mellitus with diabetic peripheral angiopathy without gangrene (HCC)    Atherosclerosis of native coronary artery of native heart with angina pectoris (HCC)    Other specified peripheral vascular diseases (HCC)    Embolism and thrombosis of arteries of the lower extremities (HCC)    Urinary frequency    Frequent falls    Left hip pain    Obesity (BMI 30-39.9)         2/21 2/27 3/6 3/12 3/14 3/19       Visit number  1  3 4 5 6       FOTO IE     done       IE/RE IE               Manuals                                                                 Exercises             Bike   L1 x3 min  L1 x3 min  L1x4 min L1x4 min L1x4 min       Stand hip abd, ext  demo X10 ea  2x10 each 1.5# 2x10 ea 1.5# 2x10 ea 2#  2x10 ea       STS  X5             HR/TR  demo  X10    TR seated 5\" x10 x15    TR seated 5\"x15 x15    TR seated 5\"x15 X20    TR seated 5\"x20       Side stepping   X3 laps  X 3 laps 1.5# x3 laps 1.5# x3 laps 2# x4 laps       Step ups   Seated hip abd PTB, add with ball 5\"x10  Seated hip abd PTB, add with ball 5\"x10  Seated hip abd PTB, add with ball 5\"x20 Seated hip abd PTB, add with ball 5\"x20 Seated hip abd GTB, add with ball 5\"x20       Obstacle course   Seated march PTB 5\" x10 b/l Seated march PTB 5\" x10 b/l Seated march PTB 5\" x10 b/l Seated march PTB 5\" x10 b/l Seated march GTB 5\" x10 b/l       Esdras walking fwd, lateral   Yellow fwd x3 laps, 2Y2O lateral x3 laps Yellow fwd x3 laps no UE assist CGA, 2Y2O lateral x3 " "laps Yellow fwd x3 laps no UE assist CGA, 2Y2O lateral x3 laps Yellow fwd x3 laps no UE assist CGA, 2Y2O lateral x3 lap Yellow fwd x3 laps no UE assist CGA, 2Y2O lateral x3 lap       Biodex    LOS x2 38%     Maze x2 LOS x2 77%     Maze x2 LOS x2 77%     Maze x2 D/C       SLS              Tandem stance              Marching on foam               Stand knee flex 5\" x10 b/l  Stand knee flex 5\" x10 b/l  Stand knee flex 1.5#,5\" x10 b/l  Stand knee flex 1.5#,5\" 2x10 b/l  Stand knee flex 1.5#,5\" 2x10 b/l        Leg Press    22# 2x10 22# 3x10 44#  2x10       SA machine    11# ext 2x10    11# flx  2x10 11# flx  2x10      11# flx  3x10 11# ext  2x10    11# flx 2x10                                                                                                  Modalities                                             Access Code: [email or web address]  URL: [email or web address]  Date: 02/21/2025  Prepared by: Gi Hein    Exercises  - Sit to Stand with Armchair  - 2 x daily - 7 x weekly - 5 reps  - Standing Hip Abduction with Counter Support  - 2 x daily - 7 x weekly - 10 reps  - Standing Hip Extension with Counter Support  - 2 x daily - 7 x weekly - 10 reps  - Heel Toe Raises with Counter Support  - 2 x daily - 7 x weekly - 15 reps                 Patient primarily treated by MARIA DE JESUS [place] with direct supervision throughout session by clinical instructor/staff therapist Gi Hein DPT.   "

## 2025-03-21 ENCOUNTER — OFFICE VISIT (OUTPATIENT)
Dept: PHYSICAL THERAPY | Facility: REHABILITATION | Age: 85
End: 2025-03-21
Payer: COMMERCIAL

## 2025-03-21 DIAGNOSIS — R29.6 FREQUENT FALLS: Primary | ICD-10-CM

## 2025-03-21 PROCEDURE — 97110 THERAPEUTIC EXERCISES: CPT

## 2025-03-21 NOTE — PROGRESS NOTES
Daily/Discharge Note     Today's date: 3/21/2025  Patient name: Mariah Irwin  : 1940  MRN: [id number]  Referring provider: Valeria Chen MD  Dx:   Encounter Diagnosis     ICD-10-CM    1. Frequent falls  R29.6           Start Time: 1030  Stop Time: 1105  Total time in clinic (min): 35 minutes    Subjective: Pt reports feeling good today. She notes that she was sore in her calves following last session, but notes its better today. Otherwise, no new changes.       Objective: See treatment diary below.      Assessment: Tolerated treatment well. Patient was able to progress to five minutes on the bike today for a warm-up. Added in several new balance exercises this session, demonstrating a challenge with a narrowed base of support and while being on a more compliant surface. Increased sway noted with narrowed base of support and when reaching outside of base of support. At this time, patient wishes to perform exercises at home. Patient demonstrated fatigue post treatment, exhibited good technique with therapeutic exercises, and would benefit from continued PT.      Plan:  Per patient preference, patient discharged to comprehensive HEP at this time. Encouraged to reach back out if any issues arise.      Precautions:   Patient Active Problem List   Diagnosis    Polycythemia vera (HCC)    Other vitamin B12 deficiency anemias    Allergic rhinitis    Angina pectoris (HCC)    Anxiety    Arteriosclerosis of coronary artery    Benign neoplasm of colon    Carotid stenosis    Cholecystitis    Colon polyp    Gastroesophageal reflux disease    Herpes zoster    Hyperlipidemia    Osteoarthritis    Morbid obesity (HCC)    Lymphedema    Posterior vitreous detachment    Renal failure    Retinal detachment    Vitamin D deficiency    Hypertensive kidney disease with chronic kidney disease, stage 1-4 or unspecified chronic kidney disease    Skin infection    Psoriasis    Injury of right popliteal artery    Stage 3b chronic  "kidney disease (HCC)    Chronic pain of left ankle    Dermatitis    Major depressive disorder, single episode, mild (HCC)    Type 2 diabetes mellitus with left eye affected by mild nonproliferative retinopathy without macular edema, without long-term current use of insulin (HCC)    Acute left-sided low back pain with left-sided sciatica    Type 2 diabetes mellitus with chronic kidney disease (HCC)    Essential hypertension    Blister of scalp with infection    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)    Age-related nuclear cataract, bilateral    Type 2 diabetes mellitus with diabetic peripheral angiopathy without gangrene (HCC)    Atherosclerosis of native coronary artery of native heart with angina pectoris (HCC)    Other specified peripheral vascular diseases (HCC)    Embolism and thrombosis of arteries of the lower extremities (HCC)    Urinary frequency    Frequent falls    Left hip pain    Obesity (BMI 30-39.9)         2/21 2/27 3/6 3/12 3/14 3/19 3/21      Visit number  1  3 4 5 6 7      FOTO IE     done       IE/RE IE               Manuals                                                                 Exercises             Bike   L1 x3 min  L1 x3 min  L1x4 min L1x4 min L1x4 min L1x5 min       Stand hip abd, ext  demo X10 ea  2x10 each 1.5# 2x10 ea 1.5# 2x10 ea 2#  2x10 ea 2#  2x10 ea      STS  X5             HR/TR  demo  X10    TR seated 5\" x10 x15    TR seated 5\"x15 x15    TR seated 5\"x15 X20    TR seated 5\"x20 X20          Side stepping   X3 laps  X 3 laps 1.5# x3 laps 1.5# x3 laps 2# x4 laps 3 laps on foam beam      Step ups   Seated hip abd PTB, add with ball 5\"x10  Seated hip abd PTB, add with ball 5\"x10  Seated hip abd PTB, add with ball 5\"x20 Seated hip abd PTB, add with ball 5\"x20 Seated hip abd GTB, add with ball 5\"x20 D/C      Obstacle course   Seated march PTB 5\" x10 b/l Seated march PTB 5\" x10 b/l Seated march PTB 5\" x10 b/l Seated march PTB 5\" x10 b/l Seated march GTB 5\" x10 b/l D/C  " "    Esdras walking fwd, lateral   Yellow fwd x3 laps, 2Y2O lateral x3 laps Yellow fwd x3 laps no UE assist CGA, 2Y2O lateral x3 laps Yellow fwd x3 laps no UE assist CGA, 2Y2O lateral x3 laps Yellow fwd x3 laps no UE assist CGA, 2Y2O lateral x3 lap Yellow fwd x3 laps no UE assist CGA, 2Y2O lateral x3 lap Yellow fwd x3 laps CGA, 2Y2O lateral x3 lap on foam beam      Biodex    LOS x2 38%     Maze x2 LOS x2 77%     Maze x2 LOS x2 77%     Maze x2 D/C       SLS                           Tandem stance on foam       30\"x2 ea       Static balance on foam       FTEO  30\"x3      Marching on foam       2x10        Stand knee flex 5\" x10 b/l  Stand knee flex 5\" x10 b/l  Stand knee flex 1.5#,5\" x10 b/l  Stand knee flex 1.5#,5\" 2x10 b/l  Stand knee flex 1.5#,5\" 2x10 b/l  D/C      Leg Press    22# 2x10 22# 3x10 44#  2x10 44#  2x10      SA machine    11# ext 2x10    11# flx  2x10 11# flx  2x10      11# flx  3x10 11# ext  2x10    11# flx 2x10 np      Ball toss on foam       20 passes                                                                        Modalities                                             Access Code: [email or web address]  URL: [email or web address]  Date: 02/21/2025  Prepared by: Gi Hein    Exercises  - Sit to Stand with Armchair  - 2 x daily - 7 x weekly - 5 reps  - Standing Hip Abduction with Counter Support  - 2 x daily - 7 x weekly - 10 reps  - Standing Hip Extension with Counter Support  - 2 x daily - 7 x weekly - 10 reps  - Heel Toe Raises with Counter Support  - 2 x daily - 7 x weekly - 15 reps                 Patient primarily treated by MARIA DE JESUS [place] with direct supervision throughout session by clinical instructor/staff therapist TEN PérezT.   "

## 2025-03-31 ENCOUNTER — OFFICE VISIT (OUTPATIENT)
Dept: FAMILY MEDICINE CLINIC | Facility: CLINIC | Age: 85
End: 2025-03-31
Payer: COMMERCIAL

## 2025-03-31 ENCOUNTER — TELEPHONE (OUTPATIENT)
Age: 85
End: 2025-03-31

## 2025-03-31 ENCOUNTER — TELEPHONE (OUTPATIENT)
Dept: FAMILY MEDICINE CLINIC | Facility: CLINIC | Age: 85
End: 2025-03-31

## 2025-03-31 VITALS
DIASTOLIC BLOOD PRESSURE: 66 MMHG | TEMPERATURE: 99.1 F | RESPIRATION RATE: 16 BRPM | HEART RATE: 76 BPM | OXYGEN SATURATION: 96 % | SYSTOLIC BLOOD PRESSURE: 122 MMHG | WEIGHT: 168 LBS | BODY MASS INDEX: 30.91 KG/M2 | HEIGHT: 62 IN

## 2025-03-31 DIAGNOSIS — I10 ESSENTIAL HYPERTENSION: ICD-10-CM

## 2025-03-31 DIAGNOSIS — U07.1 COVID-19: Primary | ICD-10-CM

## 2025-03-31 DIAGNOSIS — R05.8 OTHER COUGH: ICD-10-CM

## 2025-03-31 DIAGNOSIS — E66.01 MORBID (SEVERE) OBESITY DUE TO EXCESS CALORIES (HCC): ICD-10-CM

## 2025-03-31 PROCEDURE — G2211 COMPLEX E/M VISIT ADD ON: HCPCS

## 2025-03-31 PROCEDURE — 99214 OFFICE O/P EST MOD 30 MIN: CPT

## 2025-03-31 RX ORDER — AZITHROMYCIN 250 MG/1
TABLET, FILM COATED ORAL
Qty: 6 TABLET | Refills: 0 | Status: SHIPPED | OUTPATIENT
Start: 2025-03-31 | End: 2025-04-05

## 2025-03-31 RX ORDER — PREDNISONE 50 MG/1
50 TABLET ORAL DAILY
Qty: 5 TABLET | Refills: 0 | Status: SHIPPED | OUTPATIENT
Start: 2025-03-31 | End: 2025-04-05

## 2025-03-31 RX ORDER — BENZONATATE 200 MG/1
200 CAPSULE ORAL 3 TIMES DAILY PRN
Qty: 20 CAPSULE | Refills: 0 | Status: SHIPPED | OUTPATIENT
Start: 2025-03-31

## 2025-03-31 NOTE — TELEPHONE ENCOUNTER
Pt called reporting that late Saturday night she was very sick, pt reporting coughing, chest hurts from coughing, pt test Covid + with home test, pt reporting that yesterday she had a fever of 103 and a current temp of 101.    Pt stated that she would like medication called in without an appointment because she said she doesn't feel that she is able to drive in.    Attempted to schedule pt with a Virtual.    Pt could not confirm if she had video access on her phone and did not know how to access her [employer].    Spoke with Alma in clerical to see if a provider would be willing to do a phone call with pt.    Alma confirmed that she would speak with the providers and call patient back with further instruction.    Pt notified.

## 2025-03-31 NOTE — ASSESSMENT & PLAN NOTE
- Prescription sent for Tessalon perles. Advised of potential side effects.  - Encourage oral hydration.  - Follow up if not improving.   Orders:    benzonatate (TESSALON) 200 MG capsule; Take 1 capsule (200 mg total) by mouth 3 (three) times a day as needed for cough

## 2025-03-31 NOTE — ASSESSMENT & PLAN NOTE
- Prescriptions sent for prednisone and azithromycin. Advised of potential side effects.  - Encourage oral hydration.  - Continue tylenol as needed.  - Follow up if not improving.   Orders:    predniSONE 50 mg tablet; Take 1 tablet (50 mg total) by mouth daily for 5 days    azithromycin (Zithromax) 250 mg tablet; Take 2 tablets (500 mg total) by mouth daily for 1 day, THEN 1 tablet (250 mg total) daily for 4 days.

## 2025-03-31 NOTE — TELEPHONE ENCOUNTER
Patient  called and tested positive for COVID, is unsure how to do a video visit and is asking someone to call her about what she should do etc.    Will wait for a call from someone

## 2025-03-31 NOTE — PROGRESS NOTES
Name: Mariah Irwin      : 1940      MRN: [id number]  Encounter Provider: Teresa Flor PA-C  Encounter Date: 3/31/2025   Encounter department: [facility]  :  Assessment & Plan  COVID-19  - Prescriptions sent for prednisone and azithromycin. Advised of potential side effects.  - Encourage oral hydration.  - Continue tylenol as needed.  - Follow up if not improving.   Orders:    predniSONE 50 mg tablet; Take 1 tablet (50 mg total) by mouth daily for 5 days    azithromycin (Zithromax) 250 mg tablet; Take 2 tablets (500 mg total) by mouth daily for 1 day, THEN 1 tablet (250 mg total) daily for 4 days.    Other cough  - Prescription sent for Tessalon perles. Advised of potential side effects.  - Encourage oral hydration.  - Follow up if not improving.   Orders:    benzonatate (TESSALON) 200 MG capsule; Take 1 capsule (200 mg total) by mouth 3 (three) times a day as needed for cough    Morbid (severe) obesity due to excess calories (HCC)  - Encourage healthy diet and exercise.  - Will continue to monitor.          Essential hypertension  - Well controlled on losartan. Continue same.  - Will continue to monitor.              Falls Plan of Care: balance, strength, and gait training instructions were provided.       History of Present Illness   Patient with PMH of HTN, type 2 diabetes, hyperlipidemia, CKD, polycythemia vera presenting to the office due to covid symptoms since Saturday. States that she tested positive for covid with an at home test on Saturday. She was having fevers/chills over the weekend. Feels that her fever may have broke last night. States it was as high as 103F at home. She did not take any tylenol this morning. She states that she has been coughing a lot and feels chest tightness. She notes she had some mild wheezing as well. She denies any chest pain or shortness of breath. She feels overall congested and is having postnasal drip as well. She has no other  "concerns for today's visit.       Review of Systems   Constitutional:  Positive for chills and fever. Negative for fatigue.   HENT:  Positive for congestion and sore throat. Negative for ear pain.    Eyes:  Negative for pain.   Respiratory:  Positive for chest tightness and wheezing. Negative for cough and shortness of breath.    Cardiovascular:  Negative for chest pain and palpitations.   Gastrointestinal:  Negative for abdominal pain, nausea and vomiting.   Musculoskeletal:  Negative for arthralgias and back pain.   Skin:  Negative for rash.   Neurological:  Negative for dizziness and headaches.       Objective   /66 (BP Location: Left arm, Patient Position: Sitting, Cuff Size: Standard)   Pulse 76   Temp 99.1 °F (37.3 °C) (Tympanic)   Resp 16   Ht 5' 2\" (1.575 m)   Wt 76.2 kg (168 lb)   LMP  (LMP Unknown)   SpO2 96%   BMI 30.73 kg/m²      Physical Exam  Vitals and nursing note reviewed.   Constitutional:       General: She is not in acute distress.     Appearance: She is well-developed.   HENT:      Head: Normocephalic and atraumatic.   Eyes:      Conjunctiva/sclera: Conjunctivae normal.   Cardiovascular:      Rate and Rhythm: Normal rate and regular rhythm.      Heart sounds: No murmur heard.  Pulmonary:      Effort: Pulmonary effort is normal. No respiratory distress.      Breath sounds: Normal breath sounds.   Abdominal:      Palpations: Abdomen is soft.      Tenderness: There is no abdominal tenderness.   Musculoskeletal:         General: No swelling.   Skin:     General: Skin is warm and dry.   Neurological:      Mental Status: She is alert.   Psychiatric:         Mood and Affect: Mood normal.       "

## 2025-04-09 ENCOUNTER — HOSPITAL ENCOUNTER (OUTPATIENT)
Dept: RADIOLOGY | Facility: IMAGING CENTER | Age: 85
Discharge: HOME | End: 2025-04-09
Payer: COMMERCIAL

## 2025-04-09 DIAGNOSIS — J40 BRONCHITIS: ICD-10-CM

## 2025-04-09 DIAGNOSIS — R05.8 OTHER COUGH: ICD-10-CM

## 2025-04-09 DIAGNOSIS — R05.8 OTHER COUGH: Primary | ICD-10-CM

## 2025-04-09 PROCEDURE — 71046 X-RAY EXAM CHEST 2 VIEWS: CPT

## 2025-04-09 NOTE — TELEPHONE ENCOUNTER
I am going to place an order for her to get a chest x-ray and I will send another antibiotic called Augmentin. If she has worsening chest pain or develops shortness of breath she should go to ER.

## 2025-04-09 NOTE — TELEPHONE ENCOUNTER
Pt had a virtual visit  on 3/31/25 due to being covid positive. She is c/o slight cough and is having chest pain with no shortness of breath. She is also feeling very tired.  Please advise

## 2025-04-09 NOTE — TELEPHONE ENCOUNTER
Patient called in regards to still not feeling well and finishing medication that was prescribed. Patient would like to know if another medication can be prescribed.

## 2025-04-10 ENCOUNTER — RESULTS FOLLOW-UP (OUTPATIENT)
Dept: FAMILY MEDICINE CLINIC | Facility: CLINIC | Age: 85
End: 2025-04-10

## 2025-04-10 NOTE — TELEPHONE ENCOUNTER
Patient returned call.  Relayed provider's message regarding results.  No further questions offered.

## 2025-04-19 DIAGNOSIS — F32.0 CURRENT MILD EPISODE OF MAJOR DEPRESSIVE DISORDER, UNSPECIFIED WHETHER RECURRENT (HCC): ICD-10-CM

## 2025-04-20 DIAGNOSIS — E78.49 OTHER HYPERLIPIDEMIA: ICD-10-CM

## 2025-04-21 RX ORDER — ROSUVASTATIN CALCIUM 5 MG/1
5 TABLET, COATED ORAL DAILY
Qty: 90 TABLET | Refills: 1 | Status: SHIPPED | OUTPATIENT
Start: 2025-04-21

## 2025-04-30 PROBLEM — R05.9 COUGH: Status: RESOLVED | Noted: 2022-07-19 | Resolved: 2025-04-30

## 2025-05-27 ENCOUNTER — TELEPHONE (OUTPATIENT)
Age: 85
End: 2025-05-27

## 2025-06-06 ENCOUNTER — APPOINTMENT (OUTPATIENT)
Dept: LAB | Facility: IMAGING CENTER | Age: 85
End: 2025-06-06
Payer: COMMERCIAL

## 2025-06-06 DIAGNOSIS — D45 POLYCYTHEMIA VERA (HCC): ICD-10-CM

## 2025-06-06 LAB
ALBUMIN SERPL BCG-MCNC: 4.3 G/DL (ref 3.5–5)
ALP SERPL-CCNC: 76 U/L (ref 34–104)
ALT SERPL W P-5'-P-CCNC: 15 U/L (ref 7–52)
ANION GAP SERPL CALCULATED.3IONS-SCNC: 9 MMOL/L (ref 4–13)
AST SERPL W P-5'-P-CCNC: 18 U/L (ref 13–39)
BASOPHILS # BLD AUTO: 0.05 THOUSANDS/ÂΜL (ref 0–0.1)
BASOPHILS NFR BLD AUTO: 1 % (ref 0–1)
BILIRUB SERPL-MCNC: 0.81 MG/DL (ref 0.2–1)
BUN SERPL-MCNC: 25 MG/DL (ref 5–25)
CALCIUM SERPL-MCNC: 9.2 MG/DL (ref 8.4–10.2)
CHLORIDE SERPL-SCNC: 106 MMOL/L (ref 96–108)
CO2 SERPL-SCNC: 30 MMOL/L (ref 21–32)
CREAT SERPL-MCNC: 1.11 MG/DL (ref 0.6–1.3)
EOSINOPHIL # BLD AUTO: 0.13 THOUSAND/ÂΜL (ref 0–0.61)
EOSINOPHIL NFR BLD AUTO: 2 % (ref 0–6)
ERYTHROCYTE [DISTWIDTH] IN BLOOD BY AUTOMATED COUNT: 15.5 % (ref 11.6–15.1)
GFR SERPL CREATININE-BSD FRML MDRD: 46 ML/MIN/1.73SQ M
GLUCOSE P FAST SERPL-MCNC: 96 MG/DL (ref 65–99)
HCT VFR BLD AUTO: 45.1 % (ref 34.8–46.1)
HGB BLD-MCNC: 14.8 G/DL (ref 11.5–15.4)
IMM GRANULOCYTES # BLD AUTO: 0.03 THOUSAND/UL (ref 0–0.2)
IMM GRANULOCYTES NFR BLD AUTO: 1 % (ref 0–2)
LYMPHOCYTES # BLD AUTO: 1.01 THOUSANDS/ÂΜL (ref 0.6–4.47)
LYMPHOCYTES NFR BLD AUTO: 15 % (ref 14–44)
MAGNESIUM SERPL-MCNC: 2.1 MG/DL (ref 1.9–2.7)
MCH RBC QN AUTO: 34.5 PG (ref 26.8–34.3)
MCHC RBC AUTO-ENTMCNC: 32.8 G/DL (ref 31.4–37.4)
MCV RBC AUTO: 105 FL (ref 82–98)
MONOCYTES # BLD AUTO: 0.29 THOUSAND/ÂΜL (ref 0.17–1.22)
MONOCYTES NFR BLD AUTO: 4 % (ref 4–12)
NEUTROPHILS # BLD AUTO: 5.06 THOUSANDS/ÂΜL (ref 1.85–7.62)
NEUTS SEG NFR BLD AUTO: 77 % (ref 43–75)
NRBC BLD AUTO-RTO: 0 /100 WBCS
PLATELET # BLD AUTO: 166 THOUSANDS/UL (ref 149–390)
PMV BLD AUTO: 10.6 FL (ref 8.9–12.7)
POTASSIUM SERPL-SCNC: 4.2 MMOL/L (ref 3.5–5.3)
PROT SERPL-MCNC: 6.4 G/DL (ref 6.4–8.4)
RBC # BLD AUTO: 4.29 MILLION/UL (ref 3.81–5.12)
SODIUM SERPL-SCNC: 145 MMOL/L (ref 135–147)
WBC # BLD AUTO: 6.57 THOUSAND/UL (ref 4.31–10.16)

## 2025-06-06 PROCEDURE — 36415 COLL VENOUS BLD VENIPUNCTURE: CPT

## 2025-06-06 PROCEDURE — 80053 COMPREHEN METABOLIC PANEL: CPT

## 2025-06-06 PROCEDURE — 83735 ASSAY OF MAGNESIUM: CPT

## 2025-06-06 PROCEDURE — 85025 COMPLETE CBC W/AUTO DIFF WBC: CPT

## 2025-06-18 ENCOUNTER — OFFICE VISIT (OUTPATIENT)
Dept: HEMATOLOGY ONCOLOGY | Facility: CLINIC | Age: 85
End: 2025-06-18
Payer: COMMERCIAL

## 2025-06-18 VITALS
BODY MASS INDEX: 31.1 KG/M2 | OXYGEN SATURATION: 99 % | HEART RATE: 68 BPM | SYSTOLIC BLOOD PRESSURE: 118 MMHG | WEIGHT: 169 LBS | HEIGHT: 62 IN | RESPIRATION RATE: 18 BRPM | TEMPERATURE: 98.7 F | DIASTOLIC BLOOD PRESSURE: 72 MMHG

## 2025-06-18 DIAGNOSIS — D45 POLYCYTHEMIA VERA (HCC): Primary | ICD-10-CM

## 2025-06-18 PROCEDURE — 99214 OFFICE O/P EST MOD 30 MIN: CPT | Performed by: INTERNAL MEDICINE

## 2025-06-18 PROCEDURE — G2211 COMPLEX E/M VISIT ADD ON: HCPCS | Performed by: INTERNAL MEDICINE

## 2025-06-18 NOTE — ASSESSMENT & PLAN NOTE
The patient seems to be at goal with hematocrit around 45% at the current dose of Hydrea 500 mg daily for 2 days and 1 day off.  She will be monitored for treatment related side effects or toxicity.   She was encouraged to go back on the baby aspirin.  We will see her again in 6 months from now for close follow-up.  Orders:    CBC and differential; Future    Comprehensive metabolic panel; Future    Magnesium; Future

## 2025-06-18 NOTE — PROGRESS NOTES
Name: Mariah Irwin      : 1940      MRN: [id number]  Encounter Provider: Azeb Ribeiro MD  Encounter Date: 2025   Encounter department: [facility] [place]  :  Assessment & Plan  Polycythemia vera (HCC)  The patient seems to be at goal with hematocrit around 45% at the current dose of Hydrea 500 mg daily for 2 days and 1 day off.  She will be monitored for treatment related side effects or toxicity.   She was encouraged to go back on the baby aspirin.  We will see her again in 6 months from now for close follow-up.  Orders:    CBC and differential; Future    Comprehensive metabolic panel; Future    Magnesium; Future        Return in about 6 months (around 2025) for Office Visit 20 min, Labs.    History of Present Illness   Chief Complaint   Patient presents with    Follow-up   The patient came in today for a follow-up visit.  She continues to be on baby aspirin and Hydrea which she is tolerating relatively well.  She stated that she has some hard time with her sick .  Blood work on 2025 showed hemoglobin of 14.8 with hematocrit of 45.1%.  White cells and platelets are within normal range.  CMP was normal.  Oncology History   Cancer Staging   No matching staging information was found for the patient.  Oncology History Overview Note   Patient had extensive workup for further evaluation of her elevated H&H.  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    She was started on Hydrea 500 mg once a day February 10, 2016.  The dose was increased to 500 mg twice a day which resulted in significant drop of her platelet count.  We did then decrease the dose and put on hold 2016.    Patient had 2 phlebotomies while she was off the Hydrea.  She is currently on Hydrea 500 mg once a day 2 days on and 1 day off and tolerating this dose well.       Polycythemia vera (HCC)   2/3/2016 Initial Diagnosis     "PV (polycythemia vera) ([facility])             Review of Systems   Constitutional:  Positive for fatigue. Negative for chills and fever.   HENT:  Positive for hearing loss. Negative for ear pain and sore throat.    Eyes:  Negative for pain and visual disturbance.   Respiratory:  Negative for cough and shortness of breath.    Cardiovascular:  Negative for chest pain and palpitations.   Gastrointestinal:  Negative for abdominal pain and vomiting.   Genitourinary:  Negative for dysuria and hematuria.   Musculoskeletal:  Negative for arthralgias and back pain.   Skin:  Positive for rash. Negative for color change.   Neurological:  Positive for dizziness, numbness and headaches. Negative for seizures and syncope.   Psychiatric/Behavioral:  Positive for sleep disturbance.    All other systems reviewed and are negative.    Medical History Reviewed by provider this encounter:  Tobacco  Allergies  Meds  Problems  Med Hx  Surg Hx  Fam Hx     .  Medications Ordered Prior to Encounter[1]   Social History[2]      Objective   /72 (BP Location: Left arm, Patient Position: Sitting, Cuff Size: Adult)   Pulse 68   Temp 98.7 °F (37.1 °C)   Resp 18   Ht 5' 2\" (1.575 m)   Wt 76.7 kg (169 lb)   LMP  (LMP Unknown)   SpO2 99%   BMI 30.91 kg/m²     Pain Screening:  Pain Score:   3  ECOG   1  Physical Exam  Constitutional:       General: She is not in acute distress.     Appearance: She is well-developed. She is not diaphoretic.   HENT:      Head: Normocephalic and atraumatic.      Nose: Nose normal.     Eyes:      General: No scleral icterus.        Right eye: No discharge.         Left eye: No discharge.      Conjunctiva/sclera: Conjunctivae normal.      Pupils: Pupils are equal, round, and reactive to light.     Neck:      Thyroid: No thyromegaly.      Vascular: No JVD.      Trachea: No tracheal deviation.     Cardiovascular:      Rate and Rhythm: Normal rate and regular rhythm.      Heart sounds: Murmur heard.      No " friction rub.   Pulmonary:      Effort: Pulmonary effort is normal. No respiratory distress.      Breath sounds: Normal breath sounds. No stridor. No wheezing or rales.   Chest:      Chest wall: No tenderness.   Abdominal:      General: Bowel sounds are normal. There is no distension.      Palpations: Abdomen is soft. There is no hepatomegaly or splenomegaly.      Tenderness: There is no abdominal tenderness. There is no guarding or rebound.     Musculoskeletal:         General: No tenderness or deformity.      Cervical back: Normal range of motion and neck supple.   Lymphadenopathy:      Cervical: No cervical adenopathy.     Skin:     General: Skin is warm and dry.      Coloration: Skin is not pale.      Findings: No erythema or rash.     Neurological:      Mental Status: She is alert and oriented to person, place, and time.      Cranial Nerves: No cranial nerve deficit.      Coordination: Coordination normal.      Deep Tendon Reflexes: Reflexes are normal and symmetric.     Psychiatric:         Behavior: Behavior normal.         Thought Content: Thought content normal.         Judgment: Judgment normal.         Labs: I have reviewed the following labs:  Lab Results   Component Value Date/Time    WBC 6.57 06/06/2025 09:56 AM    RBC 4.29 06/06/2025 09:56 AM    Hemoglobin 14.8 06/06/2025 09:56 AM    Hematocrit 45.1 06/06/2025 09:56 AM     (H) 06/06/2025 09:56 AM    MCH 34.5 (H) 06/06/2025 09:56 AM    RDW 15.5 (H) 06/06/2025 09:56 AM    Platelets 166 06/06/2025 09:56 AM    Segmented % 77 (H) 06/06/2025 09:56 AM    Lymphocytes % 15 06/06/2025 09:56 AM    Monocytes % 4 06/06/2025 09:56 AM    Eosinophils Relative 2 06/06/2025 09:56 AM    Basophils Relative 1 06/06/2025 09:56 AM    Immature Grans % 1 06/06/2025 09:56 AM    Absolute Neutrophils 5.06 06/06/2025 09:56 AM     Lab Results   Component Value Date/Time    Potassium 4.2 06/06/2025 09:56 AM    Chloride 106 06/06/2025 09:56 AM    CO2 30 06/06/2025 09:56 AM     "BUN 25 06/06/2025 09:56 AM    Creatinine 1.11 06/06/2025 09:56 AM    Glucose, Fasting 96 06/06/2025 09:56 AM    Calcium 9.2 06/06/2025 09:56 AM    AST 18 06/06/2025 09:56 AM    ALT 15 06/06/2025 09:56 AM    Alkaline Phosphatase 76 06/06/2025 09:56 AM    Total Protein 6.4 06/06/2025 09:56 AM    Albumin 4.3 06/06/2025 09:56 AM    Total Bilirubin 0.81 06/06/2025 09:56 AM    eGFR 46 06/06/2025 09:56 AM     Lab Results   Component Value Date/Time    WBC 6.57 06/06/2025 09:56 AM    RBC 4.29 06/06/2025 09:56 AM    Hemoglobin 14.8 06/06/2025 09:56 AM    Hematocrit 45.1 06/06/2025 09:56 AM     (H) 06/06/2025 09:56 AM    MCH 34.5 (H) 06/06/2025 09:56 AM    RDW 15.5 (H) 06/06/2025 09:56 AM    Platelets 166 06/06/2025 09:56 AM    Segmented % 77 (H) 06/06/2025 09:56 AM    Lymphocytes % 15 06/06/2025 09:56 AM    Monocytes % 4 06/06/2025 09:56 AM    Eosinophils Relative 2 06/06/2025 09:56 AM    Basophils Relative 1 06/06/2025 09:56 AM    Immature Grans % 1 06/06/2025 09:56 AM    Absolute Neutrophils 5.06 06/06/2025 09:56 AM      Lab Results   Component Value Date/Time    Sodium 145 06/06/2025 09:56 AM    Potassium 4.2 06/06/2025 09:56 AM    Chloride 106 06/06/2025 09:56 AM    CO2 30 06/06/2025 09:56 AM    ANION GAP 9 06/06/2025 09:56 AM    BUN 25 06/06/2025 09:56 AM    Creatinine 1.11 06/06/2025 09:56 AM    Glucose, Fasting 96 06/06/2025 09:56 AM    Calcium 9.2 06/06/2025 09:56 AM    AST 18 06/06/2025 09:56 AM    ALT 15 06/06/2025 09:56 AM    Alkaline Phosphatase 76 06/06/2025 09:56 AM    Total Protein 6.4 06/06/2025 09:56 AM    Albumin 4.3 06/06/2025 09:56 AM    Total Bilirubin 0.81 06/06/2025 09:56 AM    eGFR 46 06/06/2025 09:56 AM      No results found for: \"IRON\", \"CONCFE\", \"FERRITIN\", \"RYILCWJB91\", \"FOLATE\", \"COPPER\", \"EPOREFLAB\", \"ERYTHROPRO\", \"ESR\", \"CRP\", \"HIVAGAB\", \"HEPATITIS\"   Results for orders placed or performed in visit on 06/06/25   CBC and differential   Result Value Ref Range    WBC 6.57 4.31 - 10.16 " Thousand/uL    RBC 4.29 3.81 - 5.12 Million/uL    Hemoglobin 14.8 11.5 - 15.4 g/dL    Hematocrit 45.1 34.8 - 46.1 %     (H) 82 - 98 fL    MCH 34.5 (H) 26.8 - 34.3 pg    MCHC 32.8 31.4 - 37.4 g/dL    RDW 15.5 (H) 11.6 - 15.1 %    MPV 10.6 8.9 - 12.7 fL    Platelets 166 149 - 390 Thousands/uL    nRBC 0 /100 WBCs    Segmented % 77 (H) 43 - 75 %    Immature Grans % 1 0 - 2 %    Lymphocytes % 15 14 - 44 %    Monocytes % 4 4 - 12 %    Eosinophils Relative 2 0 - 6 %    Basophils Relative 1 0 - 1 %    Absolute Neutrophils 5.06 1.85 - 7.62 Thousands/µL    Absolute Immature Grans 0.03 0.00 - 0.20 Thousand/uL    Absolute Lymphocytes 1.01 0.60 - 4.47 Thousands/µL    Absolute Monocytes 0.29 0.17 - 1.22 Thousand/µL    Eosinophils Absolute 0.13 0.00 - 0.61 Thousand/µL    Basophils Absolute 0.05 0.00 - 0.10 Thousands/µL   Comprehensive metabolic panel   Result Value Ref Range    Sodium 145 135 - 147 mmol/L    Potassium 4.2 3.5 - 5.3 mmol/L    Chloride 106 96 - 108 mmol/L    CO2 30 21 - 32 mmol/L    ANION GAP 9 4 - 13 mmol/L    BUN 25 5 - 25 mg/dL    Creatinine 1.11 0.60 - 1.30 mg/dL    Glucose, Fasting 96 65 - 99 mg/dL    Calcium 9.2 8.4 - 10.2 mg/dL    AST 18 13 - 39 U/L    ALT 15 7 - 52 U/L    Alkaline Phosphatase 76 34 - 104 U/L    Total Protein 6.4 6.4 - 8.4 g/dL    Albumin 4.3 3.5 - 5.0 g/dL    Total Bilirubin 0.81 0.20 - 1.00 mg/dL    eGFR 46 ml/min/1.73sq m   Result Value Ref Range    Magnesium 2.1 1.9 - 2.7 mg/dL                   [1]   Current Outpatient Medications on File Prior to Visit   Medication Sig Dispense Refill    aspirin (ECOTRIN LOW STRENGTH) 81 mg EC tablet Take 81 mg by mouth in the morning.      benzonatate (TESSALON) 200 MG capsule Take 1 capsule (200 mg total) by mouth 3 (three) times a day as needed for cough 20 capsule 0    Cholecalciferol (Vitamin D3) 125 MCG (5000 UT) TABS Take by mouth in the morning.      clotrimazole-betamethasone (LOTRISONE) 1-0.05 % cream Apply topically 2 (two) times a day  30 g 0    cyanocobalamin (VITAMIN B-12) 100 mcg tablet Take 1,000 mcg by mouth      erythromycin with ethanol (THERAMYCIN) 2 % external solution       furosemide (LASIX) 20 mg tablet       hydrocortisone 2.5 % cream       hydroxyurea (HYDREA) 500 mg capsule TAKE PILLS 2 DAYS ON 1 DAY  capsule 3    losartan (COZAAR) 50 mg tablet TAKE 1 TABLET BY MOUTH EVERY DAY 90 tablet 1    omeprazole (PriLOSEC) 20 mg delayed release capsule TAKE 1 CAPSULE BY MOUTH EVERY DAY 90 capsule 1    rosuvastatin (CRESTOR) 5 mg tablet TAKE 1 TABLET (5 MG TOTAL) BY MOUTH DAILY. 90 tablet 1    Sodium Fluoride 5000 PPM 1.1 % GEL       sertraline (ZOLOFT) 50 mg tablet TAKE 1 TABLET BY MOUTH EVERY DAY 90 tablet 1     No current facility-administered medications on file prior to visit.   [2]   Social History  Tobacco Use    Smoking status: Former     Current packs/day: 0.00     Average packs/day: 0.3 packs/day for 1.2 years (0.3 ttl pk-yrs)     Types: Cigarettes     Start date: 1961     Quit date: 1962     Years since quittin.5     Passive exposure: Past (SPOUSE QUIT AT THE SAME TIME)    Smokeless tobacco: Never    Tobacco comments:     no passive smoke exposure   Vaping Use    Vaping status: Never Used   Substance and Sexual Activity    Alcohol use: No    Drug use: No    Sexual activity: Not Currently     Partners: Male

## 2025-06-28 DIAGNOSIS — I10 ESSENTIAL HYPERTENSION: ICD-10-CM

## 2025-06-28 DIAGNOSIS — K21.9 GASTROESOPHAGEAL REFLUX DISEASE WITHOUT ESOPHAGITIS: ICD-10-CM

## 2025-07-01 RX ORDER — LOSARTAN POTASSIUM 50 MG/1
50 TABLET ORAL DAILY
Qty: 90 TABLET | Refills: 1 | Status: SHIPPED | OUTPATIENT
Start: 2025-07-01

## 2025-07-01 RX ORDER — OMEPRAZOLE 20 MG/1
20 CAPSULE, DELAYED RELEASE ORAL DAILY
Qty: 90 CAPSULE | Refills: 1 | Status: SHIPPED | OUTPATIENT
Start: 2025-07-01

## 2025-07-14 ENCOUNTER — OFFICE VISIT (OUTPATIENT)
Dept: FAMILY MEDICINE CLINIC | Facility: CLINIC | Age: 85
End: 2025-07-14
Payer: COMMERCIAL

## 2025-07-14 VITALS
BODY MASS INDEX: 30.69 KG/M2 | DIASTOLIC BLOOD PRESSURE: 70 MMHG | RESPIRATION RATE: 16 BRPM | HEIGHT: 62 IN | WEIGHT: 166.8 LBS | HEART RATE: 71 BPM | OXYGEN SATURATION: 99 % | SYSTOLIC BLOOD PRESSURE: 122 MMHG | TEMPERATURE: 96.5 F

## 2025-07-14 DIAGNOSIS — I10 ESSENTIAL HYPERTENSION: ICD-10-CM

## 2025-07-14 DIAGNOSIS — L03.115 CELLULITIS OF RIGHT FOOT: Primary | ICD-10-CM

## 2025-07-14 PROCEDURE — 99214 OFFICE O/P EST MOD 30 MIN: CPT

## 2025-07-14 PROCEDURE — G2211 COMPLEX E/M VISIT ADD ON: HCPCS

## 2025-07-14 RX ORDER — CEPHALEXIN 500 MG/1
500 CAPSULE ORAL 3 TIMES DAILY
Qty: 21 CAPSULE | Refills: 0 | Status: SHIPPED | OUTPATIENT
Start: 2025-07-14 | End: 2025-07-21

## 2025-07-14 NOTE — ASSESSMENT & PLAN NOTE
- Prescription sent for Keflex three times daily x 7 days. Discussed potential side effects.  - Advised to call the office or follow up if not improving.   - Discussed red flag symptoms such as fever/chills.   Orders:    cephalexin (KEFLEX) 500 mg capsule; Take 1 capsule (500 mg total) by mouth 3 (three) times a day for 7 days

## 2025-08-08 ENCOUNTER — OFFICE VISIT (OUTPATIENT)
Dept: FAMILY MEDICINE CLINIC | Facility: CLINIC | Age: 85
End: 2025-08-08
Payer: COMMERCIAL

## 2025-08-08 VITALS
SYSTOLIC BLOOD PRESSURE: 128 MMHG | OXYGEN SATURATION: 97 % | RESPIRATION RATE: 16 BRPM | TEMPERATURE: 97.4 F | WEIGHT: 167 LBS | BODY MASS INDEX: 30.73 KG/M2 | DIASTOLIC BLOOD PRESSURE: 68 MMHG | HEART RATE: 73 BPM | HEIGHT: 62 IN

## 2025-08-08 DIAGNOSIS — N18.32 STAGE 3B CHRONIC KIDNEY DISEASE (HCC): ICD-10-CM

## 2025-08-08 DIAGNOSIS — E11.51 TYPE 2 DIABETES MELLITUS WITH DIABETIC PERIPHERAL ANGIOPATHY WITHOUT GANGRENE, WITHOUT LONG-TERM CURRENT USE OF INSULIN (HCC): Primary | ICD-10-CM

## 2025-08-08 DIAGNOSIS — I10 ESSENTIAL HYPERTENSION: ICD-10-CM

## 2025-08-08 DIAGNOSIS — E78.49 OTHER HYPERLIPIDEMIA: ICD-10-CM

## 2025-08-08 DIAGNOSIS — Z00.00 MEDICARE ANNUAL WELLNESS VISIT, SUBSEQUENT: ICD-10-CM

## 2025-08-08 DIAGNOSIS — K21.9 GASTROESOPHAGEAL REFLUX DISEASE WITHOUT ESOPHAGITIS: ICD-10-CM

## 2025-08-08 DIAGNOSIS — N39.46 MIXED STRESS AND URGE URINARY INCONTINENCE: ICD-10-CM

## 2025-08-08 DIAGNOSIS — G62.9 NEUROPATHY: ICD-10-CM

## 2025-08-08 DIAGNOSIS — F32.0 MAJOR DEPRESSIVE DISORDER, SINGLE EPISODE, MILD (HCC): ICD-10-CM

## 2025-08-08 PROBLEM — E11.3299 MILD NONPROLIFERATIVE DIABETIC RETINOPATHY ASSOCIATED WITH TYPE 2 DIABETES MELLITUS (HCC): Status: ACTIVE | Noted: 2021-03-04

## 2025-08-08 LAB — SL AMB POCT HEMOGLOBIN AIC: 5.5 (ref ?–6.5)

## 2025-08-08 PROCEDURE — 99214 OFFICE O/P EST MOD 30 MIN: CPT | Performed by: FAMILY MEDICINE

## 2025-08-08 PROCEDURE — G0439 PPPS, SUBSEQ VISIT: HCPCS | Performed by: FAMILY MEDICINE

## 2025-08-08 PROCEDURE — 83036 HEMOGLOBIN GLYCOSYLATED A1C: CPT | Performed by: FAMILY MEDICINE

## 2025-08-08 PROCEDURE — G2211 COMPLEX E/M VISIT ADD ON: HCPCS | Performed by: FAMILY MEDICINE

## 2025-08-08 RX ORDER — OMEPRAZOLE 40 MG/1
40 CAPSULE, DELAYED RELEASE ORAL DAILY
Qty: 90 CAPSULE | Refills: 1 | Status: SHIPPED | OUTPATIENT
Start: 2025-08-08

## 2025-08-08 RX ORDER — GABAPENTIN 100 MG/1
100 CAPSULE ORAL 3 TIMES DAILY
Qty: 90 CAPSULE | Refills: 5 | Status: SHIPPED | OUTPATIENT
Start: 2025-08-08

## 2025-08-18 ENCOUNTER — APPOINTMENT (OUTPATIENT)
Dept: LAB | Facility: IMAGING CENTER | Age: 85
End: 2025-08-18
Payer: COMMERCIAL

## 2025-08-18 DIAGNOSIS — N18.32 STAGE 3B CHRONIC KIDNEY DISEASE (HCC): ICD-10-CM

## 2025-08-18 DIAGNOSIS — E11.51 TYPE 2 DIABETES MELLITUS WITH DIABETIC PERIPHERAL ANGIOPATHY WITHOUT GANGRENE, WITHOUT LONG-TERM CURRENT USE OF INSULIN (HCC): ICD-10-CM

## 2025-08-18 LAB
ALBUMIN SERPL BCG-MCNC: 4.3 G/DL (ref 3.5–5)
ALP SERPL-CCNC: 76 U/L (ref 34–104)
ALT SERPL W P-5'-P-CCNC: 17 U/L (ref 7–52)
ANION GAP SERPL CALCULATED.3IONS-SCNC: 10 MMOL/L (ref 4–13)
AST SERPL W P-5'-P-CCNC: 23 U/L (ref 13–39)
BILIRUB SERPL-MCNC: 0.55 MG/DL (ref 0.2–1)
BUN SERPL-MCNC: 37 MG/DL (ref 5–25)
CALCIUM SERPL-MCNC: 9.6 MG/DL (ref 8.4–10.2)
CHLORIDE SERPL-SCNC: 102 MMOL/L (ref 96–108)
CHOLEST SERPL-MCNC: 152 MG/DL (ref ?–200)
CO2 SERPL-SCNC: 30 MMOL/L (ref 21–32)
CREAT SERPL-MCNC: 1.63 MG/DL (ref 0.6–1.3)
CREAT UR-MCNC: 73.7 MG/DL
GFR SERPL CREATININE-BSD FRML MDRD: 29 ML/MIN/1.73SQ M
GLUCOSE P FAST SERPL-MCNC: 107 MG/DL (ref 65–99)
HDLC SERPL-MCNC: 63 MG/DL
LDLC SERPL CALC-MCNC: 69 MG/DL (ref 0–100)
MAGNESIUM SERPL-MCNC: 2.1 MG/DL (ref 1.9–2.7)
MICROALBUMIN UR-MCNC: <7 MG/L
PHOSPHATE SERPL-MCNC: 4.3 MG/DL (ref 2.3–4.1)
POTASSIUM SERPL-SCNC: 4.7 MMOL/L (ref 3.5–5.3)
PROT SERPL-MCNC: 7.2 G/DL (ref 6.4–8.4)
SODIUM SERPL-SCNC: 142 MMOL/L (ref 135–147)
TRIGL SERPL-MCNC: 99 MG/DL (ref ?–150)
URATE SERPL-MCNC: 6.5 MG/DL (ref 2–7.5)

## 2025-08-18 PROCEDURE — 84443 ASSAY THYROID STIM HORMONE: CPT

## 2025-08-18 PROCEDURE — 82043 UR ALBUMIN QUANTITATIVE: CPT

## 2025-08-18 PROCEDURE — 84550 ASSAY OF BLOOD/URIC ACID: CPT

## 2025-08-18 PROCEDURE — 83735 ASSAY OF MAGNESIUM: CPT

## 2025-08-18 PROCEDURE — 80053 COMPREHEN METABOLIC PANEL: CPT

## 2025-08-18 PROCEDURE — 83036 HEMOGLOBIN GLYCOSYLATED A1C: CPT

## 2025-08-18 PROCEDURE — 84100 ASSAY OF PHOSPHORUS: CPT

## 2025-08-18 PROCEDURE — 82570 ASSAY OF URINE CREATININE: CPT

## 2025-08-18 PROCEDURE — 83970 ASSAY OF PARATHORMONE: CPT

## 2025-08-18 PROCEDURE — 36415 COLL VENOUS BLD VENIPUNCTURE: CPT

## 2025-08-18 PROCEDURE — 80061 LIPID PANEL: CPT

## 2025-08-19 LAB
EST. AVERAGE GLUCOSE BLD GHB EST-MCNC: 117 MG/DL
HBA1C MFR BLD: 5.7 %
PTH-INTACT SERPL-MCNC: 55.6 PG/ML (ref 12–88)
TSH SERPL DL<=0.05 MIU/L-ACNC: 2.63 UIU/ML (ref 0.45–4.5)